# Patient Record
Sex: FEMALE | Race: BLACK OR AFRICAN AMERICAN | Employment: FULL TIME | URBAN - METROPOLITAN AREA
[De-identification: names, ages, dates, MRNs, and addresses within clinical notes are randomized per-mention and may not be internally consistent; named-entity substitution may affect disease eponyms.]

---

## 2017-02-28 ENCOUNTER — TELEPHONE (OUTPATIENT)
Dept: FAMILY MEDICINE CLINIC | Age: 42
End: 2017-02-28

## 2017-03-21 ENCOUNTER — HOSPITAL ENCOUNTER (EMERGENCY)
Age: 42
Discharge: HOME | End: 2017-03-21
Attending: EMERGENCY MEDICINE
Payer: MEDICARE

## 2017-03-21 ENCOUNTER — APPOINTMENT (OUTPATIENT)
Dept: ULTRASOUND IMAGING | Age: 42
End: 2017-03-21
Payer: MEDICARE

## 2017-03-21 VITALS
WEIGHT: 195.99 LBS | HEART RATE: 78 BPM | HEIGHT: 70 IN | TEMPERATURE: 98.1 F | RESPIRATION RATE: 18 BRPM | BODY MASS INDEX: 28.06 KG/M2 | SYSTOLIC BLOOD PRESSURE: 130 MMHG | DIASTOLIC BLOOD PRESSURE: 75 MMHG | OXYGEN SATURATION: 99 %

## 2017-03-21 DIAGNOSIS — N93.8 DUB (DYSFUNCTIONAL UTERINE BLEEDING): Primary | ICD-10-CM

## 2017-03-21 DIAGNOSIS — R10.2 PELVIC PAIN: ICD-10-CM

## 2017-03-21 DIAGNOSIS — F32.A DEPRESSION, UNSPECIFIED DEPRESSION TYPE: ICD-10-CM

## 2017-03-21 DIAGNOSIS — F41.9 ANXIETY: ICD-10-CM

## 2017-03-21 LAB
ALBUMIN SERPL BCP-MCNC: 3.6 G/DL (ref 3.5–5)
ALBUMIN/GLOB SERPL: 1.1 {RATIO} (ref 1.1–2.2)
ALP SERPL-CCNC: 80 U/L (ref 45–117)
ALT SERPL-CCNC: 14 U/L (ref 12–78)
AMORPH CRY URNS QL MICRO: ABNORMAL
ANION GAP BLD CALC-SCNC: 6 MMOL/L (ref 5–15)
APPEARANCE UR: CLEAR
AST SERPL W P-5'-P-CCNC: 10 U/L (ref 15–37)
BACTERIA URNS QL MICRO: NEGATIVE /HPF
BASOPHILS # BLD AUTO: 0 K/UL (ref 0–0.1)
BASOPHILS # BLD: 0 % (ref 0–1)
BILIRUB SERPL-MCNC: 0.5 MG/DL (ref 0.2–1)
BILIRUB UR QL CFM: NEGATIVE
BUN SERPL-MCNC: 16 MG/DL (ref 6–20)
BUN/CREAT SERPL: 17 (ref 12–20)
CALCIUM SERPL-MCNC: 8.8 MG/DL (ref 8.5–10.1)
CHLORIDE SERPL-SCNC: 108 MMOL/L (ref 97–108)
CLUE CELLS VAG QL WET PREP: NORMAL
CO2 SERPL-SCNC: 28 MMOL/L (ref 21–32)
COLOR UR: ABNORMAL
CREAT SERPL-MCNC: 0.92 MG/DL (ref 0.55–1.02)
EOSINOPHIL # BLD: 0 K/UL (ref 0–0.4)
EOSINOPHIL NFR BLD: 1 % (ref 0–7)
EPITH CASTS URNS QL MICRO: ABNORMAL /LPF
ERYTHROCYTE [DISTWIDTH] IN BLOOD BY AUTOMATED COUNT: 13.6 % (ref 11.5–14.5)
GLOBULIN SER CALC-MCNC: 3.2 G/DL (ref 2–4)
GLUCOSE SERPL-MCNC: 99 MG/DL (ref 65–100)
GLUCOSE UR STRIP.AUTO-MCNC: NEGATIVE MG/DL
HCG UR QL: NEGATIVE
HCT VFR BLD AUTO: 36.6 % (ref 35–47)
HGB BLD-MCNC: 11.9 G/DL (ref 11.5–16)
HGB UR QL STRIP: ABNORMAL
KETONES UR QL STRIP.AUTO: NEGATIVE MG/DL
KOH PREP SPEC: NORMAL
LEUKOCYTE ESTERASE UR QL STRIP.AUTO: NEGATIVE
LIPASE SERPL-CCNC: 140 U/L (ref 73–393)
LYMPHOCYTES # BLD AUTO: 22 % (ref 12–49)
LYMPHOCYTES # BLD: 1.2 K/UL (ref 0.8–3.5)
MCH RBC QN AUTO: 29 PG (ref 26–34)
MCHC RBC AUTO-ENTMCNC: 32.5 G/DL (ref 30–36.5)
MCV RBC AUTO: 89.1 FL (ref 80–99)
MONOCYTES # BLD: 0.3 K/UL (ref 0–1)
MONOCYTES NFR BLD AUTO: 5 % (ref 5–13)
MUCOUS THREADS URNS QL MICRO: ABNORMAL /LPF
NEUTS SEG # BLD: 4.1 K/UL (ref 1.8–8)
NEUTS SEG NFR BLD AUTO: 72 % (ref 32–75)
NITRITE UR QL STRIP.AUTO: NEGATIVE
PH UR STRIP: 6 [PH] (ref 5–8)
PLATELET # BLD AUTO: 151 K/UL (ref 150–400)
POTASSIUM SERPL-SCNC: 3.6 MMOL/L (ref 3.5–5.1)
PROT SERPL-MCNC: 6.8 G/DL (ref 6.4–8.2)
PROT UR STRIP-MCNC: ABNORMAL MG/DL
RBC # BLD AUTO: 4.11 M/UL (ref 3.8–5.2)
RBC #/AREA URNS HPF: ABNORMAL /HPF (ref 0–5)
SERVICE CMNT-IMP: NORMAL
SODIUM SERPL-SCNC: 142 MMOL/L (ref 136–145)
SP GR UR REFRACTOMETRY: 1.03 (ref 1–1.03)
T VAGINALIS VAG QL WET PREP: NORMAL
UA: UC IF INDICATED,UAUC: ABNORMAL
UROBILINOGEN UR QL STRIP.AUTO: 1 EU/DL (ref 0.2–1)
WBC # BLD AUTO: 5.6 K/UL (ref 3.6–11)
WBC URNS QL MICRO: ABNORMAL /HPF (ref 0–4)

## 2017-03-21 PROCEDURE — 87491 CHLMYD TRACH DNA AMP PROBE: CPT | Performed by: PHYSICIAN ASSISTANT

## 2017-03-21 PROCEDURE — 83690 ASSAY OF LIPASE: CPT | Performed by: PHYSICIAN ASSISTANT

## 2017-03-21 PROCEDURE — 99284 EMERGENCY DEPT VISIT MOD MDM: CPT

## 2017-03-21 PROCEDURE — 96361 HYDRATE IV INFUSION ADD-ON: CPT

## 2017-03-21 PROCEDURE — 87210 SMEAR WET MOUNT SALINE/INK: CPT | Performed by: PHYSICIAN ASSISTANT

## 2017-03-21 PROCEDURE — 36415 COLL VENOUS BLD VENIPUNCTURE: CPT | Performed by: PHYSICIAN ASSISTANT

## 2017-03-21 PROCEDURE — 96374 THER/PROPH/DIAG INJ IV PUSH: CPT

## 2017-03-21 PROCEDURE — 74011250637 HC RX REV CODE- 250/637: Performed by: PHYSICIAN ASSISTANT

## 2017-03-21 PROCEDURE — 80053 COMPREHEN METABOLIC PANEL: CPT | Performed by: PHYSICIAN ASSISTANT

## 2017-03-21 PROCEDURE — 85025 COMPLETE CBC W/AUTO DIFF WBC: CPT | Performed by: PHYSICIAN ASSISTANT

## 2017-03-21 PROCEDURE — 81001 URINALYSIS AUTO W/SCOPE: CPT | Performed by: PHYSICIAN ASSISTANT

## 2017-03-21 PROCEDURE — 76857 US EXAM PELVIC LIMITED: CPT

## 2017-03-21 PROCEDURE — 81025 URINE PREGNANCY TEST: CPT | Performed by: PHYSICIAN ASSISTANT

## 2017-03-21 PROCEDURE — 74011250636 HC RX REV CODE- 250/636: Performed by: PHYSICIAN ASSISTANT

## 2017-03-21 RX ORDER — ONDANSETRON 4 MG/1
4 TABLET, ORALLY DISINTEGRATING ORAL
Status: COMPLETED | OUTPATIENT
Start: 2017-03-21 | End: 2017-03-21

## 2017-03-21 RX ORDER — MELOXICAM 15 MG/1
15 TABLET ORAL DAILY
Qty: 10 TAB | Refills: 0 | Status: SHIPPED | OUTPATIENT
Start: 2017-03-21 | End: 2017-03-31

## 2017-03-21 RX ORDER — SODIUM CHLORIDE 0.9 % (FLUSH) 0.9 %
5-10 SYRINGE (ML) INJECTION AS NEEDED
Status: DISCONTINUED | OUTPATIENT
Start: 2017-03-21 | End: 2017-03-21 | Stop reason: HOSPADM

## 2017-03-21 RX ORDER — ONDANSETRON 4 MG/1
4 TABLET, ORALLY DISINTEGRATING ORAL
Qty: 10 TAB | Refills: 0 | Status: SHIPPED | OUTPATIENT
Start: 2017-03-21 | End: 2017-04-12

## 2017-03-21 RX ORDER — MEDROXYPROGESTERONE ACETATE 10 MG/1
10 TABLET ORAL DAILY
Qty: 10 TAB | Refills: 0 | Status: SHIPPED | OUTPATIENT
Start: 2017-03-21 | End: 2017-03-31

## 2017-03-21 RX ORDER — KETOROLAC TROMETHAMINE 30 MG/ML
30 INJECTION, SOLUTION INTRAMUSCULAR; INTRAVENOUS
Status: COMPLETED | OUTPATIENT
Start: 2017-03-21 | End: 2017-03-21

## 2017-03-21 RX ORDER — SODIUM CHLORIDE 0.9 % (FLUSH) 0.9 %
5-10 SYRINGE (ML) INJECTION EVERY 8 HOURS
Status: DISCONTINUED | OUTPATIENT
Start: 2017-03-21 | End: 2017-03-21 | Stop reason: HOSPADM

## 2017-03-21 RX ADMIN — SODIUM CHLORIDE 1000 ML: 900 INJECTION, SOLUTION INTRAVENOUS at 11:33

## 2017-03-21 RX ADMIN — KETOROLAC TROMETHAMINE 30 MG: 30 INJECTION, SOLUTION INTRAMUSCULAR at 11:33

## 2017-03-21 RX ADMIN — Medication 10 ML: at 11:33

## 2017-03-21 RX ADMIN — ONDANSETRON 4 MG: 4 TABLET, ORALLY DISINTEGRATING ORAL at 11:26

## 2017-03-21 NOTE — ED PROVIDER NOTES
HPI Comments: Vito Hopson is a 39 y.o. female with PMhx significant for Asthma, Hypothyroidism, Anemia, Vitamin D deficiency who presents ambulatory to the ED with cc of gradually worsening lower suprapubic abdominal pain with associated heavy vaginal bleeding that began yesterday. Pt reports that she has been on the depovera shot x 10 years but reports she has not had it in 2 months. She states that she has had abdominal pain and vaginal bleeding prior to her current symptoms noting that her bleeding lasted about 2 weeks. Pt reports using mulitple sanitary napkins daily secondary to the servarity of her vaginal bleeding. Pt denies any nausea, vomiting, vaginal discharge, or back pain    Social history significant for: - Tobacco, + EtOH, - Illicit drug use    PCP: Akhil Iniguez MD    There are no other complaints, changes or physical findings at this time. Written by Michelle Hood ED Scribe, as dictated by Cele Bee      The history is provided by the patient. No  was used. Past Medical History:   Diagnosis Date    Anemia     Asthma     Fall 2006    fall at work and injured back    Hypothyroidism     hypothyriod     Vitamin D deficiency        Past Surgical History:   Procedure Laterality Date    HX HERNIA REPAIR  10/07/2016    open ventral hernia repair by Dr. Laura Gaytan.  HX HERNIA REPAIR  10/07/2016    open ventral hernia repair by Dr Silvia Ho Right 1/2015    had to rebrake because u=it healed wrong         Family History:   Problem Relation Age of Onset    Heart Disease Maternal Grandmother     Stroke Maternal Grandmother        Social History     Social History    Marital status: SINGLE     Spouse name: N/A    Number of children: N/A    Years of education: N/A     Occupational History    Not on file.      Social History Main Topics    Smoking status: Never Smoker    Smokeless tobacco: Never Used    Alcohol use 0.0 oz/week      Comment: Socially.  Drug use: No    Sexual activity: No     Other Topics Concern    Not on file     Social History Narrative    ** Merged History Encounter **              ALLERGIES: Latex; Latex; Onion; Oxycodone; Tomato; Egg; Hydrocodone; Mushroom; and Shellfish derived    Review of Systems   Constitutional: Negative for chills and fever. HENT: Negative for congestion, rhinorrhea and sore throat. Respiratory: Negative for cough and shortness of breath. Cardiovascular: Negative for chest pain and palpitations. Gastrointestinal: Positive for abdominal pain (Lower suprapubic ). Negative for diarrhea, nausea and vomiting. Genitourinary: Positive for vaginal bleeding. Negative for dysuria, hematuria and vaginal discharge. Musculoskeletal: Negative for back pain, neck pain and neck stiffness. Skin: Negative for rash and wound. Neurological: Negative for dizziness and headaches. Psychiatric/Behavioral: Negative for agitation and confusion. Patient Vitals for the past 12 hrs:   Temp Pulse Resp BP SpO2   03/21/17 1042 98.1 °F (36.7 °C) 82 16 127/85 98 %       Physical Exam   Constitutional: She is oriented to person, place, and time. She appears well-developed and well-nourished. No distress. WDWN AA female, alert, anxious, but NAD   HENT:   Head: Normocephalic and atraumatic. Nose: Nose normal.   Eyes: Conjunctivae and EOM are normal. Right eye exhibits no discharge. Left eye exhibits no discharge. No scleral icterus. Neck: Normal range of motion. Neck supple. No JVD present. No tracheal deviation present. No thyromegaly present. Cardiovascular: Normal rate, regular rhythm and normal heart sounds. Pulmonary/Chest: Effort normal and breath sounds normal. No respiratory distress. She has no wheezes. Abdominal: Soft. There is no tenderness. Genitourinary:   Genitourinary Comments: See PELVIC exam for details   Musculoskeletal: Normal range of motion. She exhibits no edema. Lymphadenopathy:     She has no cervical adenopathy. Neurological: She is alert and oriented to person, place, and time. She exhibits normal muscle tone. Coordination normal.   Skin: Skin is warm and dry. She is not diaphoretic. No pallor. Psychiatric: She has a normal mood and affect. Her behavior is normal. Judgment normal.   Nursing note and vitals reviewed. MDM  Number of Diagnoses or Management Options  Anxiety:   Depression, unspecified depression type:   DUB (dysfunctional uterine bleeding):   Pelvic pain:   Diagnosis management comments:   DDx: DUB, Uterine fibroid, ovarian cyst       Amount and/or Complexity of Data Reviewed  Clinical lab tests: ordered and reviewed  Review and summarize past medical records: yes    Patient Progress  Patient progress: stable        Pelvic Exam  Date/Time: 3/21/2017 11:35 AM  Performed by: PA  Procedure duration:  15 minutes. Documented by:  Antoinette Capellan PA-C. Exam assisted by:  Carlos De La Cruz RN. Type of exam performed: bimanual and speculum. External genitalia appearance: normal.    Vaginal exam:  bleeding. Bleeding: moderate  Cervical exam:  os closed and no cervical motion tenderness. Specimen(s) collected:  chlamydia and GC. Bimanual exam:  normal.    Patient tolerance: Patient tolerated the procedure well with no immediate complications          Procedure Note - Pelvic Exam:    11:30 AM  Performed by: Ulysses Son  Chaperoned by: Carlos De LaC ruz RN  Pelvic exam was performed using bimanual and speculum. Further findings noted in physical exam.   The procedure took 1-15 minutes, and pt tolerated well. Written by FLOYD Choudhuryibe, as dictated by Ulysses Son.     LABORATORY TESTS:  Recent Results (from the past 12 hour(s))   CBC WITH AUTOMATED DIFF    Collection Time: 03/21/17 11:31 AM   Result Value Ref Range    WBC 5.6 3.6 - 11.0 K/uL    RBC 4.11 3.80 - 5.20 M/uL    HGB 11.9 11.5 - 16.0 g/dL    HCT 36.6 35.0 - 47.0 %    MCV 89.1 80.0 - 99.0 FL    MCH 29.0 26.0 - 34.0 PG    MCHC 32.5 30.0 - 36.5 g/dL    RDW 13.6 11.5 - 14.5 %    PLATELET 922 556 - 872 K/uL    NEUTROPHILS 72 32 - 75 %    LYMPHOCYTES 22 12 - 49 %    MONOCYTES 5 5 - 13 %    EOSINOPHILS 1 0 - 7 %    BASOPHILS 0 0 - 1 %    ABS. NEUTROPHILS 4.1 1.8 - 8.0 K/UL    ABS. LYMPHOCYTES 1.2 0.8 - 3.5 K/UL    ABS. MONOCYTES 0.3 0.0 - 1.0 K/UL    ABS. EOSINOPHILS 0.0 0.0 - 0.4 K/UL    ABS. BASOPHILS 0.0 0.0 - 0.1 K/UL   METABOLIC PANEL, COMPREHENSIVE    Collection Time: 03/21/17 11:31 AM   Result Value Ref Range    Sodium 142 136 - 145 mmol/L    Potassium 3.6 3.5 - 5.1 mmol/L    Chloride 108 97 - 108 mmol/L    CO2 28 21 - 32 mmol/L    Anion gap 6 5 - 15 mmol/L    Glucose 99 65 - 100 mg/dL    BUN 16 6 - 20 MG/DL    Creatinine 0.92 0.55 - 1.02 MG/DL    BUN/Creatinine ratio 17 12 - 20      GFR est AA >60 >60 ml/min/1.73m2    GFR est non-AA >60 >60 ml/min/1.73m2    Calcium 8.8 8.5 - 10.1 MG/DL    Bilirubin, total 0.5 0.2 - 1.0 MG/DL    ALT (SGPT) 14 12 - 78 U/L    AST (SGOT) 10 (L) 15 - 37 U/L    Alk.  phosphatase 80 45 - 117 U/L    Protein, total 6.8 6.4 - 8.2 g/dL    Albumin 3.6 3.5 - 5.0 g/dL    Globulin 3.2 2.0 - 4.0 g/dL    A-G Ratio 1.1 1.1 - 2.2     LIPASE    Collection Time: 03/21/17 11:31 AM   Result Value Ref Range    Lipase 140 73 - 393 U/L   KOH, OTHER SOURCES    Collection Time: 03/21/17 11:41 AM   Result Value Ref Range    Special Requests: NO SPECIAL REQUESTS      KOH NO YEAST SEEN     WET PREP    Collection Time: 03/21/17 11:41 AM   Result Value Ref Range    Clue cells CLUE CELLS ABSENT      Wet prep NO TRICHOMONAS SEEN     URINALYSIS W/ REFLEX CULTURE    Collection Time: 03/21/17 11:51 AM   Result Value Ref Range    Color YELLOW/STRAW      Appearance CLEAR CLEAR      Specific gravity 1.030 1.003 - 1.030      pH (UA) 6.0 5.0 - 8.0      Protein TRACE (A) NEG mg/dL    Glucose NEGATIVE  NEG mg/dL    Ketone NEGATIVE  NEG mg/dL    Blood LARGE (A) NEG      Urobilinogen 1.0 0.2 - 1.0 EU/dL    Nitrites NEGATIVE  NEG      Leukocyte Esterase NEGATIVE  NEG      WBC 0-4 0 - 4 /hpf    RBC 20-50 0 - 5 /hpf    Epithelial cells FEW FEW /lpf    Bacteria NEGATIVE  NEG /hpf    UA:UC IF INDICATED CULTURE NOT INDICATED BY UA RESULT CNI      Mucus 3+ (A) NEG /lpf    Amorphous Crystals FEW (A) NEG     HCG URINE, QL    Collection Time: 03/21/17 11:51 AM   Result Value Ref Range    HCG urine, Ql. NEGATIVE  NEG     BILIRUBIN, CONFIRM    Collection Time: 03/21/17 11:51 AM   Result Value Ref Range    Bilirubin UA, confirm NEGATIVE  NEG         IMAGING RESULTS:  US PELV NON OBS  LTD   Final Result   History: Recently off Depo-Provera for 10 years, heavy bleeding.     Ultrasonography of the pelvis was performed transabdominally. The patient  refused the transvaginal scan. The uterus measures 8.0 x 4.4 x 5.4 cm. The  endometrial stripe measures 5.4 mm. The ovaries are obscured as the bladder is  suboptimally filled.     IMPRESSION  IMPRESSION: Unremarkable limited pelvic ultrasound. MEDICATIONS GIVEN:  Medications   sodium chloride (NS) flush 5-10 mL (not administered)   sodium chloride (NS) flush 5-10 mL (10 mL IntraVENous Given 3/21/17 1133)   sodium chloride 0.9 % bolus infusion 1,000 mL (0 mL IntraVENous IV Completed 3/21/17 1345)   ondansetron (ZOFRAN ODT) tablet 4 mg (4 mg Oral Given 3/21/17 1126)   ketorolac (TORADOL) injection 30 mg (30 mg IntraVENous Given 3/21/17 1133)       IMPRESSION:  1. DUB (dysfunctional uterine bleeding)    2. Pelvic pain    3. Anxiety    4. Depression, unspecified depression type        PLAN:  1. Current Discharge Medication List      START taking these medications    Details   meloxicam (MOBIC) 15 mg tablet Take 1 Tab by mouth daily for 10 days. Qty: 10 Tab, Refills: 0      ondansetron (ZOFRAN ODT) 4 mg disintegrating tablet Take 1 Tab by mouth every eight (8) hours as needed for Nausea for up to 10 doses.   Qty: 10 Tab, Refills: 0      medroxyPROGESTERone (PROVERA) 10 mg tablet Take 1 Tab by mouth daily for 10 days. Qty: 10 Tab, Refills: 0           2. Follow-up Information     Follow up With Details Comments Contact Info    your Ob/Gyn In 2 days      [facility] EMERGENCY DEPT  If symptoms worsen [address] [place] [address]  [phone]        Return to ED if worse     DISCHARGE NOTE  1:56 PM  The patient has been re-evaluated and is ready for discharge. Reviewed available results with patient. Counseled patient on diagnosis and care plan. Patient has expressed understanding, and all questions have been answered. Patient agrees with plan and agrees to follow up as recommended, or return to the ED if their symptoms worsen. Discharge instructions have been provided and explained to the patient, along with reasons to return to the ED. This note is prepared by José Benjamin, acting as Scribe for [facility]. CLARA Clarke: The scribe's documentation has been prepared under my direction and personally reviewed by me in its entirety. I confirm that the note above accurately reflects all work, treatment, procedures, and medical decision making performed by me.

## 2017-03-21 NOTE — ED NOTES
Pt medicated per PA orders. Pelvic exam completed by PA and chaperoned by this RN.  Pt given warm blanket

## 2017-03-21 NOTE — ED NOTES
Discharge instructions reviewed with pt by KELLY Eaton. pt able to return/verbalize discharge instructions. Copy of discharge instructions given. Work note and RX given to patient. Patient condition stable, respiratory status within normal limits, neuro status intact.  Ambulatory out of er, accompanied by sister

## 2017-03-21 NOTE — DISCHARGE INSTRUCTIONS
Abnormal Uterine Bleeding: Care Instructions  Your Care Instructions  Abnormal uterine bleeding (AUB) is irregular bleeding from the uterus that is longer or heavier than usual or does not occur at your regular time. Sometimes it is caused by changes in hormone levels. It can also be caused by growths in the uterus, such as fibroids or polyps. Sometimes a cause cannot be found. You may have heavy bleeding when you are not expecting your period. Your doctor may suggest a pregnancy test, if you think you are pregnant. Follow-up care is a key part of your treatment and safety. Be sure to make and go to all appointments, and call your doctor if you are having problems. It's also a good idea to know your test results and keep a list of the medicines you take. How can you care for yourself at home? · Be safe with medicines. Take pain medicines exactly as directed. ¨ If the doctor gave you a prescription medicine for pain, take it as prescribed. ¨ If you are not taking a prescription pain medicine, ask your doctor if you can take an over-the-counter medicine. · You may be low in iron because of blood loss. Eat a balanced diet that is high in iron and vitamin C. Foods rich in iron include red meat, shellfish, eggs, beans, and leafy green vegetables. Talk to your doctor about whether you need to take iron pills or a multivitamin. When should you call for help? Call 911 anytime you think you may need emergency care. For example, call if:  · You passed out (lost consciousness). Call your doctor now or seek immediate medical care if:  · You have sudden, severe pain in your belly or pelvis. · You have severe vaginal bleeding. You are soaking through your usual pads or tampons every hour for 2 or more hours. · You feel dizzy or lightheaded, or you feel like you may faint. Watch closely for changes in your health, and be sure to contact your doctor if:  · You have new belly or pelvic pain.   · You have a fever.  · Your bleeding gets worse or lasts longer than 1 week. · You think you may be pregnant. Where can you learn more? Go to http://danika-sarah.info/. Enter B032 in the search box to learn more about \"Abnormal Uterine Bleeding: Care Instructions. \"  Current as of: February 25, 2016  Content Version: 11.1  © 7035-3132 [employer]. Care instructions adapted under license by [employer] (which disclaims liability or warranty for this information). If you have questions about a medical condition or this instruction, always ask your healthcare professional. [place] [address] any warranty or liability for your use of this information.

## 2017-03-21 NOTE — LETTER
[address] 
[facility] EMERGENCY DEPT 
[address] [place] [address] [address] [id number] 
[phone] Work/School Note Date: 3/21/2017 To Whom It May concern: 
 
Monique Stubbs was seen and treated today in the emergency room. She may return to work in 1 to 2 days, as symptoms improve. Sincerely, Adina Pelletierma

## 2017-03-21 NOTE — ED NOTES
Assumed care of patient. Pt resting in position of comfort. Call bell within reach. Pt reports \"migraine headaches and real real bad menstrual abdominal pain\" States headaches for the past 1-2 days in temples across forehead. No diagnosis of migraines per patient. Took tylenol, advil with no relief. Lower abd pain reports hasn't had her depo in 2 months. States vaginal bleeding since yesterday, heaving bleeding.

## 2017-03-22 LAB
C TRACH DNA SPEC QL NAA+PROBE: NEGATIVE
N GONORRHOEA DNA SPEC QL NAA+PROBE: NEGATIVE
SAMPLE TYPE: NORMAL
SERVICE CMNT-IMP: NORMAL
SPECIMEN SOURCE: NORMAL

## 2017-04-11 PROCEDURE — 99284 EMERGENCY DEPT VISIT MOD MDM: CPT

## 2017-04-12 ENCOUNTER — HOSPITAL ENCOUNTER (EMERGENCY)
Age: 42
Discharge: HOME | End: 2017-04-12
Attending: EMERGENCY MEDICINE | Admitting: EMERGENCY MEDICINE
Payer: COMMERCIAL

## 2017-04-12 ENCOUNTER — APPOINTMENT (OUTPATIENT)
Dept: GENERAL RADIOLOGY | Age: 42
End: 2017-04-12
Attending: PHYSICIAN ASSISTANT
Payer: COMMERCIAL

## 2017-04-12 ENCOUNTER — APPOINTMENT (OUTPATIENT)
Dept: CT IMAGING | Age: 42
End: 2017-04-12
Attending: PHYSICIAN ASSISTANT
Payer: COMMERCIAL

## 2017-04-12 VITALS
SYSTOLIC BLOOD PRESSURE: 121 MMHG | TEMPERATURE: 98.1 F | WEIGHT: 193.56 LBS | OXYGEN SATURATION: 100 % | RESPIRATION RATE: 16 BRPM | DIASTOLIC BLOOD PRESSURE: 77 MMHG | BODY MASS INDEX: 27.71 KG/M2 | HEIGHT: 70 IN | HEART RATE: 78 BPM

## 2017-04-12 DIAGNOSIS — S16.1XXA NECK STRAIN, INITIAL ENCOUNTER: Primary | ICD-10-CM

## 2017-04-12 DIAGNOSIS — M25.512 ACUTE PAIN OF LEFT SHOULDER: ICD-10-CM

## 2017-04-12 DIAGNOSIS — S39.012A BACK STRAIN, INITIAL ENCOUNTER: ICD-10-CM
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
## 2017-04-12 LAB
HCG UR QL: NEGATIVE
HCG UR QL: POSITIVE

## 2017-04-12 PROCEDURE — 72072 X-RAY EXAM THORAC SPINE 3VWS: CPT

## 2017-04-12 PROCEDURE — 72125 CT NECK SPINE W/O DYE: CPT

## 2017-04-12 PROCEDURE — 73030 X-RAY EXAM OF SHOULDER: CPT

## 2017-04-12 PROCEDURE — 74011250637 HC RX REV CODE- 250/637: Performed by: PHYSICIAN ASSISTANT

## 2017-04-12 PROCEDURE — 81025 URINE PREGNANCY TEST: CPT

## 2017-04-12 RX ORDER — ALBUTEROL SULFATE 5 MG/ML
1.25 SOLUTION RESPIRATORY (INHALATION) ONCE
COMMUNITY
End: 2017-09-12

## 2017-04-12 RX ORDER — CYCLOBENZAPRINE HCL 10 MG
10 TABLET ORAL
Status: COMPLETED | OUTPATIENT
Start: 2017-04-12 | End: 2017-04-12

## 2017-04-12 RX ORDER — HYDROMORPHONE HYDROCHLORIDE 2 MG/1
2 TABLET ORAL ONCE
Status: COMPLETED | OUTPATIENT
Start: 2017-04-12 | End: 2017-04-12

## 2017-04-12 RX ORDER — NAPROXEN 500 MG/1
500 TABLET ORAL 2 TIMES DAILY WITH MEALS
Qty: 20 TAB | Refills: 0 | Status: SHIPPED | OUTPATIENT
Start: 2017-04-12 | End: 2017-04-22

## 2017-04-12 RX ORDER — DIAZEPAM 5 MG/1
5 TABLET ORAL
Qty: 15 TAB | Refills: 0 | Status: SHIPPED | OUTPATIENT
Start: 2017-04-12 | End: 2017-08-06

## 2017-04-12 RX ORDER — ACETAMINOPHEN 325 MG/1
500 TABLET ORAL
COMMUNITY
End: 2017-09-12

## 2017-04-12 RX ORDER — NAPROXEN 250 MG/1
500 TABLET ORAL ONCE
Status: COMPLETED | OUTPATIENT
Start: 2017-04-12 | End: 2017-04-12

## 2017-04-12 RX ADMIN — HYDROMORPHONE HYDROCHLORIDE 2 MG: 2 TABLET ORAL at 02:30

## 2017-04-12 RX ADMIN — CYCLOBENZAPRINE HYDROCHLORIDE 10 MG: 10 TABLET, FILM COATED ORAL at 02:30

## 2017-04-12 RX ADMIN — NAPROXEN 500 MG: 250 TABLET ORAL at 02:30

## 2017-04-12 NOTE — Clinical Note
1. Start Naproxen and Valium 2. Rest, ice, no heavy lifting (over 10 lbs) for 1 week 3.  Follow up with PCP and/or ortho

## 2017-04-12 NOTE — ED NOTES
Pt ambulated out of ED with steady gait after declining wheelchair ride out. No other complaints voiced at this time.

## 2017-04-12 NOTE — LETTER
[address] 
[facility] EMERGENCY DEPT 
[address] [place] [address] [address] [phone] 
[phone] Work/School Note Date: 4/11/2017 To Whom It May concern: 
 
Acosta Hamm was seen and treated today in the emergency room by the following provider(s): 
Attending Provider: Juan Glez MD 
Physician Assistant: TITI Mcginnis. Acosta Hamm may return to work on 4/14/17 or once cleared by a licensed healthcare physician. Sincerely, Xiao Mcginnis
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
## 2017-04-12 NOTE — DISCHARGE INSTRUCTIONS

## 2017-04-12 NOTE — ED NOTES
Pt resting in stretcher. Call bell within reach. Updated on plan of care. Provided with pain medication, \"non-diet pepsi and marcus crackers. \" per request of pt. No other complaints voiced at this time.

## 2017-04-13 ENCOUNTER — OFFICE VISIT (OUTPATIENT)
Dept: FAMILY MEDICINE CLINIC | Age: 42
End: 2017-04-13

## 2017-04-13 VITALS
WEIGHT: 194 LBS | HEART RATE: 79 BPM | RESPIRATION RATE: 18 BRPM | TEMPERATURE: 97.4 F | OXYGEN SATURATION: 98 % | HEIGHT: 70 IN | BODY MASS INDEX: 27.77 KG/M2 | SYSTOLIC BLOOD PRESSURE: 134 MMHG | DIASTOLIC BLOOD PRESSURE: 92 MMHG

## 2017-04-13 DIAGNOSIS — S46.912A LEFT SHOULDER STRAIN, INITIAL ENCOUNTER: Primary | ICD-10-CM

## 2017-04-13 DIAGNOSIS — E03.9 ACQUIRED HYPOTHYROIDISM: ICD-10-CM

## 2017-04-13 DIAGNOSIS — M54.2 NECK PAIN: ICD-10-CM
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
## 2017-04-14 LAB
T4 FREE SERPL-MCNC: 0.86 NG/DL (ref 0.82–1.77)
TSH SERPL DL<=0.005 MIU/L-ACNC: 2.66 UIU/ML (ref 0.45–4.5)

## 2017-08-06 ENCOUNTER — APPOINTMENT (OUTPATIENT)
Dept: CT IMAGING | Age: 42
End: 2017-08-06
Attending: EMERGENCY MEDICINE
Payer: COMMERCIAL

## 2017-08-06 ENCOUNTER — HOSPITAL ENCOUNTER (EMERGENCY)
Age: 42
Discharge: HOME | End: 2017-08-07
Attending: EMERGENCY MEDICINE
Payer: COMMERCIAL

## 2017-08-06 ENCOUNTER — APPOINTMENT (OUTPATIENT)
Dept: GENERAL RADIOLOGY | Age: 42
End: 2017-08-06
Attending: EMERGENCY MEDICINE
Payer: COMMERCIAL

## 2017-08-06 DIAGNOSIS — H66.93 BILATERAL ACUTE OTITIS MEDIA: ICD-10-CM

## 2017-08-06 DIAGNOSIS — R07.89 ATYPICAL CHEST PAIN: ICD-10-CM

## 2017-08-06 DIAGNOSIS — S09.90XA CHI (CLOSED HEAD INJURY), INITIAL ENCOUNTER: Primary | ICD-10-CM

## 2017-08-06 LAB
ALBUMIN SERPL BCP-MCNC: 3.3 G/DL (ref 3.5–5)
ALBUMIN/GLOB SERPL: 1 {RATIO} (ref 1.1–2.2)
ALP SERPL-CCNC: 74 U/L (ref 45–117)
ALT SERPL-CCNC: 16 U/L (ref 12–78)
ANION GAP BLD CALC-SCNC: 8 MMOL/L (ref 5–15)
APTT PPP: 26.7 SEC (ref 22.1–32.5)
AST SERPL W P-5'-P-CCNC: 16 U/L (ref 15–37)
BASOPHILS # BLD AUTO: 0 K/UL (ref 0–0.1)
BASOPHILS # BLD: 0 % (ref 0–1)
BILIRUB SERPL-MCNC: 0.3 MG/DL (ref 0.2–1)
BUN SERPL-MCNC: 16 MG/DL (ref 6–20)
BUN/CREAT SERPL: 17 (ref 12–20)
CALCIUM SERPL-MCNC: 7.9 MG/DL (ref 8.5–10.1)
CHLORIDE SERPL-SCNC: 109 MMOL/L (ref 97–108)
CK SERPL-CCNC: 203 U/L (ref 26–192)
CO2 SERPL-SCNC: 24 MMOL/L (ref 21–32)
CREAT SERPL-MCNC: 0.92 MG/DL (ref 0.55–1.02)
EOSINOPHIL # BLD: 0.3 K/UL (ref 0–0.4)
EOSINOPHIL NFR BLD: 4 % (ref 0–7)
ERYTHROCYTE [DISTWIDTH] IN BLOOD BY AUTOMATED COUNT: 13.6 % (ref 11.5–14.5)
GLOBULIN SER CALC-MCNC: 3.3 G/DL (ref 2–4)
GLUCOSE SERPL-MCNC: 103 MG/DL (ref 65–100)
HCT VFR BLD AUTO: 34.5 % (ref 35–47)
HGB BLD-MCNC: 11.7 G/DL (ref 11.5–16)
INR PPP: 1 (ref 0.9–1.1)
LYMPHOCYTES # BLD AUTO: 35 % (ref 12–49)
LYMPHOCYTES # BLD: 2.2 K/UL (ref 0.8–3.5)
MAGNESIUM SERPL-MCNC: 2.2 MG/DL (ref 1.6–2.4)
MCH RBC QN AUTO: 30.4 PG (ref 26–34)
MCHC RBC AUTO-ENTMCNC: 33.9 G/DL (ref 30–36.5)
MCV RBC AUTO: 89.6 FL (ref 80–99)
MONOCYTES # BLD: 0.6 K/UL (ref 0–1)
MONOCYTES NFR BLD AUTO: 9 % (ref 5–13)
NEUTS SEG # BLD: 3.3 K/UL (ref 1.8–8)
NEUTS SEG NFR BLD AUTO: 52 % (ref 32–75)
PLATELET # BLD AUTO: 207 K/UL (ref 150–400)
POTASSIUM SERPL-SCNC: 3.9 MMOL/L (ref 3.5–5.1)
PROT SERPL-MCNC: 6.6 G/DL (ref 6.4–8.2)
PROTHROMBIN TIME: 9.9 SEC (ref 9–11.1)
RBC # BLD AUTO: 3.85 M/UL (ref 3.8–5.2)
SODIUM SERPL-SCNC: 141 MMOL/L (ref 136–145)
THERAPEUTIC RANGE,PTTT: NORMAL SECS (ref 58–77)
TROPONIN I SERPL-MCNC: <0.04 NG/ML
WBC # BLD AUTO: 6.4 K/UL (ref 3.6–11)

## 2017-08-06 PROCEDURE — 74011250637 HC RX REV CODE- 250/637: Performed by: EMERGENCY MEDICINE

## 2017-08-06 PROCEDURE — 99283 EMERGENCY DEPT VISIT LOW MDM: CPT

## 2017-08-06 PROCEDURE — 85730 THROMBOPLASTIN TIME PARTIAL: CPT | Performed by: EMERGENCY MEDICINE

## 2017-08-06 PROCEDURE — 80053 COMPREHEN METABOLIC PANEL: CPT | Performed by: EMERGENCY MEDICINE

## 2017-08-06 PROCEDURE — 85025 COMPLETE CBC W/AUTO DIFF WBC: CPT | Performed by: EMERGENCY MEDICINE

## 2017-08-06 PROCEDURE — 70450 CT HEAD/BRAIN W/O DYE: CPT

## 2017-08-06 PROCEDURE — 96376 TX/PRO/DX INJ SAME DRUG ADON: CPT

## 2017-08-06 PROCEDURE — 96374 THER/PROPH/DIAG INJ IV PUSH: CPT

## 2017-08-06 PROCEDURE — 82550 ASSAY OF CK (CPK): CPT | Performed by: EMERGENCY MEDICINE

## 2017-08-06 PROCEDURE — 84484 ASSAY OF TROPONIN QUANT: CPT | Performed by: EMERGENCY MEDICINE

## 2017-08-06 PROCEDURE — 71010 XR CHEST PORT: CPT

## 2017-08-06 PROCEDURE — 36415 COLL VENOUS BLD VENIPUNCTURE: CPT | Performed by: EMERGENCY MEDICINE

## 2017-08-06 PROCEDURE — 74011250636 HC RX REV CODE- 250/636: Performed by: EMERGENCY MEDICINE

## 2017-08-06 PROCEDURE — 93005 ELECTROCARDIOGRAM TRACING: CPT

## 2017-08-06 PROCEDURE — 85610 PROTHROMBIN TIME: CPT | Performed by: EMERGENCY MEDICINE

## 2017-08-06 PROCEDURE — 96361 HYDRATE IV INFUSION ADD-ON: CPT

## 2017-08-06 PROCEDURE — 83735 ASSAY OF MAGNESIUM: CPT | Performed by: EMERGENCY MEDICINE

## 2017-08-06 PROCEDURE — 96375 TX/PRO/DX INJ NEW DRUG ADDON: CPT

## 2017-08-06 RX ORDER — AMOXICILLIN 500 MG/1
500 TABLET, FILM COATED ORAL 3 TIMES DAILY
Qty: 21 TAB | Refills: 0 | Status: SHIPPED | OUTPATIENT
Start: 2017-08-06 | End: 2017-09-12

## 2017-08-06 RX ORDER — MORPHINE SULFATE 10 MG/ML
6 INJECTION, SOLUTION INTRAMUSCULAR; INTRAVENOUS
Status: COMPLETED | OUTPATIENT
Start: 2017-08-06 | End: 2017-08-06

## 2017-08-06 RX ORDER — PROMETHAZINE HYDROCHLORIDE 25 MG/1
25 TABLET ORAL
Qty: 12 TAB | Refills: 0 | Status: SHIPPED | OUTPATIENT
Start: 2017-08-06 | End: 2017-08-07 | Stop reason: ALTCHOICE

## 2017-08-06 RX ORDER — ONDANSETRON 2 MG/ML
4 INJECTION INTRAMUSCULAR; INTRAVENOUS
Status: COMPLETED | OUTPATIENT
Start: 2017-08-06 | End: 2017-08-06

## 2017-08-06 RX ORDER — AMOXICILLIN 250 MG/1
500 CAPSULE ORAL
Status: COMPLETED | OUTPATIENT
Start: 2017-08-06 | End: 2017-08-06

## 2017-08-06 RX ORDER — KETOROLAC TROMETHAMINE 10 MG/1
10 TABLET, FILM COATED ORAL
Qty: 10 TAB | Refills: 0 | Status: SHIPPED | OUTPATIENT
Start: 2017-08-06 | End: 2017-08-07 | Stop reason: ALTCHOICE

## 2017-08-06 RX ORDER — DIAZEPAM 5 MG/1
5 TABLET ORAL
Qty: 20 TAB | Refills: 0 | Status: SHIPPED | OUTPATIENT
Start: 2017-08-06 | End: 2017-08-07 | Stop reason: ALTCHOICE

## 2017-08-06 RX ORDER — KETOROLAC TROMETHAMINE 30 MG/ML
30 INJECTION, SOLUTION INTRAMUSCULAR; INTRAVENOUS
Status: COMPLETED | OUTPATIENT
Start: 2017-08-06 | End: 2017-08-06

## 2017-08-06 RX ADMIN — AMOXICILLIN 500 MG: 250 CAPSULE ORAL at 23:50

## 2017-08-06 RX ADMIN — ONDANSETRON 4 MG: 2 INJECTION INTRAMUSCULAR; INTRAVENOUS at 23:50

## 2017-08-06 RX ADMIN — ONDANSETRON 4 MG: 2 INJECTION INTRAMUSCULAR; INTRAVENOUS at 23:14

## 2017-08-06 RX ADMIN — SODIUM CHLORIDE 1000 ML: 900 INJECTION, SOLUTION INTRAVENOUS at 23:50

## 2017-08-06 RX ADMIN — KETOROLAC TROMETHAMINE 30 MG: 30 INJECTION, SOLUTION INTRAMUSCULAR at 23:50

## 2017-08-06 RX ADMIN — MORPHINE SULFATE 6 MG: 10 INJECTION INTRAMUSCULAR; INTRAVENOUS; SUBCUTANEOUS at 23:51

## 2017-08-06 NOTE — LETTER
[address] 
[facility] EMERGENCY DEPT 
[address] [place] [address]. [address] [phone] 
[phone] Work/School Note Date: 8/6/2017 To Whom It May concern: 
 
Saumya Woods was seen and treated today in the emergency room by the following provider(s): 
Attending Provider: Thomas Graves MD.   
 
Saumya Woods may return to work on 08/08/2017. Sincerely, Thomas Graves MD

## 2017-08-07 ENCOUNTER — OFFICE VISIT (OUTPATIENT)
Dept: FAMILY MEDICINE CLINIC | Age: 42
End: 2017-08-07

## 2017-08-07 VITALS
RESPIRATION RATE: 16 BRPM | HEIGHT: 70 IN | DIASTOLIC BLOOD PRESSURE: 69 MMHG | BODY MASS INDEX: 27.96 KG/M2 | SYSTOLIC BLOOD PRESSURE: 107 MMHG | TEMPERATURE: 98.2 F | OXYGEN SATURATION: 99 % | WEIGHT: 195.33 LBS | HEART RATE: 76 BPM

## 2017-08-07 VITALS
OXYGEN SATURATION: 99 % | TEMPERATURE: 97.9 F | DIASTOLIC BLOOD PRESSURE: 103 MMHG | SYSTOLIC BLOOD PRESSURE: 130 MMHG | WEIGHT: 197 LBS | BODY MASS INDEX: 28.2 KG/M2 | RESPIRATION RATE: 18 BRPM | HEART RATE: 76 BPM | HEIGHT: 70 IN

## 2017-08-07 DIAGNOSIS — R51.9 HEADACHE, UNSPECIFIED HEADACHE TYPE: Primary | ICD-10-CM

## 2017-08-07 DIAGNOSIS — R03.0 ELEVATED BLOOD PRESSURE READING: ICD-10-CM

## 2017-08-07 DIAGNOSIS — H66.90 ACUTE OTITIS MEDIA, UNSPECIFIED LATERALITY, UNSPECIFIED OTITIS MEDIA TYPE: ICD-10-CM

## 2017-08-07 LAB
ATRIAL RATE: 72 BPM
CALCULATED P AXIS, ECG09: 57 DEGREES
CALCULATED R AXIS, ECG10: 37 DEGREES
CALCULATED T AXIS, ECG11: 33 DEGREES
DIAGNOSIS, 93000: NORMAL
P-R INTERVAL, ECG05: 144 MS
Q-T INTERVAL, ECG07: 368 MS
QRS DURATION, ECG06: 80 MS
QTC CALCULATION (BEZET), ECG08: 402 MS
VENTRICULAR RATE, ECG03: 72 BPM

## 2017-08-07 NOTE — PROGRESS NOTES
Chief Complaint   Patient presents with    Thyroid Problem     bloodwork     Migraine     follow up ED     1. Have you been to the ER, urgent care clinic since your last visit? Hospitalized since your last visit? Yes [facility]- Ear pain, chest pain, mirgraine- 8/6/2017    2. Have you seen or consulted any other health care providers outside of the [address] [place] [address] since your last visit? Include any pap smears or colon screening.  No

## 2017-08-07 NOTE — LETTER
NOTIFICATION RETURN TO WORK / SCHOOL 
 
8/7/2017 7:07 PM 
 
Ms. Deion Singh [facility] [address] [phone] To Whom It May Concern: 
 
Deion Singh is currently under the care of LISA Joseph. She will return to work/school on: 8/11/17. If there are questions or concerns please have the patient contact our office.  
 
 
 
Sincerely, 
 
 
Pawel Barrett NP

## 2017-08-07 NOTE — ED PROVIDER NOTES
HPI Comments: Debbie Suarez is a 39 y.o. female, pmhx significant for asthma, hypothyroidism, anemia, vitamin D deficiency, who presents ambulatory to the ED c/o sudden onset 6/10 HA with associated waxing and waning CP that radiates to her BL shoulders with associated mild SOB, dry cough and photophobia x today. She mentions an additional sx of BLE cramping x weeks. Pt states that her CP, SOB and cough feel similar to previous asthma attacks and she is concerned that it is the indicator that one is coming on. Pt hit her head yesterday but denies LOC afterwards. She endorses taking aleve without relief. Pt specifically denies fever, chills, neck pain, numbness, tingling, hx of migraines or taking blood thinners. PCP: Bertrand Hammans, MD      Social Hx: -tobacco, +EtOH (socially), -Illicit Drugs   FHx: no pertinent family hx   Medication Allergies: oxycodone, hydrocodone      There are no other complaints, changes, or physical findings at this time. The history is provided by the patient. Past Medical History:   Diagnosis Date    Anemia     Asthma     Fall 2006    fall at work and injured back    Hypothyroidism     hypothyriod     Vitamin D deficiency        Past Surgical History:   Procedure Laterality Date    HX HERNIA REPAIR  10/07/2016    open ventral hernia repair by Dr. Aurora Erickson.  HX HERNIA REPAIR  10/07/2016    open ventral hernia repair by Dr Arelis Dietrich Right 1/2015    had to rebrake because u=it healed wrong         Family History:   Problem Relation Age of Onset    Heart Disease Maternal Grandmother     Stroke Maternal Grandmother        Social History     Social History    Marital status: SINGLE     Spouse name: N/A    Number of children: N/A    Years of education: N/A     Occupational History    Not on file.      Social History Main Topics    Smoking status: Never Smoker    Smokeless tobacco: Never Used    Alcohol use 0.0 oz/week      Comment: Socially.  Drug use: No    Sexual activity: No     Other Topics Concern    Not on file     Social History Narrative    ** Merged History Encounter **              ALLERGIES: Latex; Latex; Onion; Oxycodone; Tomato; Egg; Hydrocodone; Mushroom; and Shellfish derived    Review of Systems   Constitutional: Negative for chills and fever. Eyes: Positive for photophobia. Respiratory: Positive for cough (dry) and shortness of breath (mild). Cardiovascular: Positive for chest pain. Gastrointestinal: Negative for abdominal pain. Endocrine: Negative for heat intolerance. Genitourinary: Negative for dysuria. Musculoskeletal: Negative for neck pain. +BLE cramping    Skin: Negative for rash. Allergic/Immunologic: Negative for immunocompromised state. Neurological: Positive for headaches. Negative for syncope and numbness. No tingling   Hematological: Does not bruise/bleed easily. Psychiatric/Behavioral: Negative. All other systems reviewed and are negative. Patient Vitals for the past 12 hrs:   Temp Pulse Resp BP SpO2   08/06/17 2113 98.2 °F (36.8 °C) 73 16 (!) 147/100 100 %     Physical Exam   Constitutional: She is oriented to person, place, and time. She appears well-developed and well-nourished. No distress. HENT:   Head: Normocephalic and atraumatic. Eyes: EOM are normal.   Neck: Normal range of motion. Neck non-tender    Cardiovascular: Normal rate, regular rhythm and normal heart sounds. Pulmonary/Chest: Effort normal and breath sounds normal. No respiratory distress. She exhibits no tenderness. Abdominal: Soft. Bowel sounds are normal. She exhibits no mass. There is no tenderness. Musculoskeletal: Normal range of motion. She exhibits no edema. Neurological: She is alert and oriented to person, place, and time. Coordination normal.   Sensory and motor intact    Skin: Skin is warm and dry. Psychiatric: She has a normal mood and affect.    Nursing note and vitals reviewed. MDM  Number of Diagnoses or Management Options  Atypical chest pain:   Bilateral acute otitis media:   CHI (closed head injury), initial encounter:   Diagnosis management comments: DDx: tension HA, closed head injury, ICH, atypical CP, CAd, anxiety, electrolyte abnormality       Amount and/or Complexity of Data Reviewed  Clinical lab tests: ordered and reviewed  Tests in the radiology section of CPT®: reviewed and ordered  Tests in the medicine section of CPT®: reviewed and ordered  Review and summarize past medical records: yes  Independent visualization of images, tracings, or specimens: yes    Patient Progress  Patient progress: stable    ED Course       Procedures  EKG interpretation: (Preliminary) 2229  Rhythm: normal sinus rhythm; and regular . Rate (approx.): 72 bpm; Axis: normal; UT interval: normal; QRS interval: normal ; ST/T wave: normal; Other findings: unchanged from previous ekg.   Written by Rahul Toney ED Scribe as dictated by Romana Richter, MD    DISCHARGE NOTE:  LABORATORY TESTS:  Recent Results (from the past 12 hour(s))   EKG, 12 LEAD, INITIAL    Collection Time: 08/06/17 10:29 PM   Result Value Ref Range    Ventricular Rate 72 BPM    Atrial Rate 72 BPM    P-R Interval 144 ms    QRS Duration 80 ms    Q-T Interval 368 ms    QTC Calculation (Bezet) 402 ms    Calculated P Axis 57 degrees    Calculated R Axis 37 degrees    Calculated T Axis 33 degrees    Diagnosis       Normal sinus rhythm  Low voltage QRS  Borderline ECG  When compared with ECG of 03-OCT-2016 09:36,  No significant change was found     CBC WITH AUTOMATED DIFF    Collection Time: 08/06/17 10:43 PM   Result Value Ref Range    WBC 6.4 3.6 - 11.0 K/uL    RBC 3.85 3.80 - 5.20 M/uL    HGB 11.7 11.5 - 16.0 g/dL    HCT 34.5 (L) 35.0 - 47.0 %    MCV 89.6 80.0 - 99.0 FL    MCH 30.4 26.0 - 34.0 PG    MCHC 33.9 30.0 - 36.5 g/dL    RDW 13.6 11.5 - 14.5 %    PLATELET 115 818 - 362 K/uL    NEUTROPHILS 52 32 - 75 % LYMPHOCYTES 35 12 - 49 %    MONOCYTES 9 5 - 13 %    EOSINOPHILS 4 0 - 7 %    BASOPHILS 0 0 - 1 %    ABS. NEUTROPHILS 3.3 1.8 - 8.0 K/UL    ABS. LYMPHOCYTES 2.2 0.8 - 3.5 K/UL    ABS. MONOCYTES 0.6 0.0 - 1.0 K/UL    ABS. EOSINOPHILS 0.3 0.0 - 0.4 K/UL    ABS. BASOPHILS 0.0 0.0 - 0.1 K/UL   METABOLIC PANEL, COMPREHENSIVE    Collection Time: 08/06/17 10:43 PM   Result Value Ref Range    Sodium 141 136 - 145 mmol/L    Potassium 3.9 3.5 - 5.1 mmol/L    Chloride 109 (H) 97 - 108 mmol/L    CO2 24 21 - 32 mmol/L    Anion gap 8 5 - 15 mmol/L    Glucose 103 (H) 65 - 100 mg/dL    BUN 16 6 - 20 MG/DL    Creatinine 0.92 0.55 - 1.02 MG/DL    BUN/Creatinine ratio 17 12 - 20      GFR est AA >60 >60 ml/min/1.73m2    GFR est non-AA >60 >60 ml/min/1.73m2    Calcium 7.9 (L) 8.5 - 10.1 MG/DL    Bilirubin, total 0.3 0.2 - 1.0 MG/DL    ALT (SGPT) 16 12 - 78 U/L    AST (SGOT) 16 15 - 37 U/L    Alk. phosphatase 74 45 - 117 U/L    Protein, total 6.6 6.4 - 8.2 g/dL    Albumin 3.3 (L) 3.5 - 5.0 g/dL    Globulin 3.3 2.0 - 4.0 g/dL    A-G Ratio 1.0 (L) 1.1 - 2.2     MAGNESIUM    Collection Time: 08/06/17 10:43 PM   Result Value Ref Range    Magnesium 2.2 1.6 - 2.4 mg/dL   CK    Collection Time: 08/06/17 10:43 PM   Result Value Ref Range     (H) 26 - 192 U/L   TROPONIN I    Collection Time: 08/06/17 10:43 PM   Result Value Ref Range    Troponin-I, Qt. <0.04 <0.05 ng/mL   PROTHROMBIN TIME + INR    Collection Time: 08/06/17 10:43 PM   Result Value Ref Range    INR 1.0 0.9 - 1.1      Prothrombin time 9.9 9.0 - 11.1 sec   PTT    Collection Time: 08/06/17 10:43 PM   Result Value Ref Range    aPTT 26.7 22.1 - 32.5 sec    aPTT, therapeutic range     58.0 - 77.0 SECS       IMAGING RESULTS:  CT Results  (Last 48 hours)               08/06/17 2253  CT HEAD WO CONT Final result    Impression:  IMPRESSION: No acute intracranial abnormality. Narrative:  EXAM:  CT HEAD WO CONT       INDICATION:   pain/trauma       COMPARISON: CT 4/30/2013. CONTRAST:  None. TECHNIQUE: Unenhanced CT of the head was performed using 5 mm images. Brain and   bone windows were generated. CT dose reduction was achieved through use of a   standardized protocol tailored for this examination and automatic exposure   control for dose modulation. FINDINGS:   The ventricles and sulci are normal in size, shape and configuration and   midline. There is no significant white matter disease. There is no intracranial   hemorrhage, extra-axial collection, mass, mass effect or midline shift. The   basilar cisterns are open. No acute infarct is identified. The bone windows   demonstrate no abnormalities. The visualized portions of the paranasal sinuses   and mastoid air cells are clear. CXR Results  (Last 48 hours)               08/06/17 2217  XR CHEST PORT Final result    Impression:  IMPRESSION: No evidence of acute cardiopulmonary process. Narrative:  INDICATION:  Chest Pain        COMPARISON: 11/16/2014       FINDINGS: Single AP portable view of the chest obtained at 2213 demonstrates a   stable cardiomediastinal silhouette. The lungs are clear bilaterally. No osseous   abnormalities are seen. MEDICATIONS GIVEN:  Medications   sodium chloride 0.9 % bolus infusion 1,000 mL (not administered)   morphine injection 6 mg (not administered)   ondansetron (ZOFRAN) injection 4 mg (not administered)   ketorolac (TORADOL) injection 30 mg (not administered)   ondansetron (ZOFRAN) injection 4 mg (4 mg IntraVENous Given 8/6/17 3220)       IMPRESSION:  1. CHI (closed head injury), initial encounter    2. Atypical chest pain    3. Bilateral acute otitis media        PLAN:  Current Discharge Medication List      START taking these medications    Details   diazePAM (VALIUM) 5 mg tablet Take 1 Tab by mouth every eight (8) hours as needed for Anxiety.  Max Daily Amount: 15 mg.  Qty: 20 Tab, Refills: 0      promethazine (PHENERGAN) 25 mg tablet Take 1 Tab by mouth every six (6) hours as needed for Nausea. Qty: 12 Tab, Refills: 0      ketorolac (TORADOL) 10 mg tablet Take 1 Tab by mouth every six (6) hours as needed for Pain. Qty: 10 Tab, Refills: 0           Follow-up Information     Follow up With Details Comments Contact Info    Leeann Bean MD In 2 days As needed     [facility] EMERGENCY DEPT  If symptoms worsen [address] [facility]  As needed [address] [facility]  [phone]        Return to ED if worse     DISCHARGE NOTE:  11:24 PM  Pt has been reexamined. Pt has no new complaints, changes, or physical findings. Care plan outlined and precautions discussed. All available results reviewed with pt. All medications reviewed with pt. All of pts questions and concerns addressed. Pt agrees to f/u as instructed and agrees to return to ED upon further deterioration. Pt is ready to go home. Written by Farzaneh Sanabria ED Scribe as dictated by temporal arteritis    ATTESTATION   This note is prepared by Farzaneh Sanabria acting as scribe for . Hailey Goins MD : The scribe's documentation has been prepared under my direction and personally reviewed by me in its entirety. I confirm that the note above accurately reflects all work, treatment, procedures, and medical decision making performed by me.

## 2017-08-07 NOTE — DISCHARGE INSTRUCTIONS
Ear Infection (Otitis Media): Care Instructions  Your Care Instructions    An ear infection may start with a cold and affect the middle ear (otitis media). It can hurt a lot. Most ear infections clear up on their own in a couple of days. Most often you will not need antibiotics. This is because many ear infections are caused by a virus. Antibiotics don't work against a virus. Regular doses of pain medicines are the best way to reduce your fever and help you feel better. Follow-up care is a key part of your treatment and safety. Be sure to make and go to all appointments, and call your doctor if you are having problems. It's also a good idea to know your test results and keep a list of the medicines you take. How can you care for yourself at home? · Take pain medicines exactly as directed. ¨ If the doctor gave you a prescription medicine for pain, take it as prescribed. ¨ If you are not taking a prescription pain medicine, take an over-the-counter medicine, such as acetaminophen (Tylenol), ibuprofen (Advil, Motrin), or naproxen (Aleve). Read and follow all instructions on the label. ¨ Do not take two or more pain medicines at the same time unless the doctor told you to. Many pain medicines have acetaminophen, which is Tylenol. Too much acetaminophen (Tylenol) can be harmful. · Plan to take a full dose of pain reliever before bedtime. Getting enough sleep will help you get better. · Try a warm, moist washcloth on the ear. It may help relieve pain. · If your doctor prescribed antibiotics, take them as directed. Do not stop taking them just because you feel better. You need to take the full course of antibiotics. When should you call for help? Call your doctor now or seek immediate medical care if:  · You have new or increasing ear pain. · You have new or increasing pus or blood draining from your ear. · You have a fever with a stiff neck or a severe headache.   Watch closely for changes in your [employer], and be sure to contact your doctor if:  · You have new or worse symptoms. · You are not getting better after taking an antibiotic for 2 days. Where can you learn more? Go to http://danika-sarah.info/. Enter R934 in the search box to learn more about \"Ear Infection (Otitis Media): Care Instructions. \"  Current as of: May 4, 2017  Content Version: 11.3  © 4768-3296 [employer]. Care instructions adapted under license by [employer] (which disclaims liability or warranty for this information). If you have questions about a medical condition or this instruction, always ask your healthcare professional. [address] any warranty or liability for your use of this information. Chest Pain: Care Instructions  Your Care Instructions  There are many things that can cause chest pain. Some are not serious and will get better on their own in a few days. But some kinds of chest pain need more testing and treatment. Your doctor may have recommended a follow-up visit in the next 8 to 12 hours. If you are not getting better, you may need more tests or treatment. Even though your doctor has released you, you still need to watch for any problems. The doctor carefully checked you, but sometimes problems can develop later. If you have new symptoms or if your symptoms do not get better, get medical care right away. If you have worse or different chest pain or pressure that lasts more than 5 minutes or you passed out (lost consciousness), call 911 or seek other emergency help right away. A medical visit is only one step in your treatment. Even if you feel better, you still need to do what your doctor recommends, such as going to all suggested follow-up appointments and taking medicines exactly as directed. This will help you recover and help prevent future problems. How can you care for yourself at home? · Rest until you feel better.   · Take your medicine exactly as prescribed. Call your doctor if you think you are having a problem with your medicine. · Do not drive after taking a prescription pain medicine. When should you call for help? Call 911 if:  · You passed out (lost consciousness). · You have severe difficulty breathing. · You have symptoms of a heart attack. These may include:  ¨ Chest pain or pressure, or a strange feeling in your chest.  ¨ Sweating. ¨ Shortness of breath. ¨ Nausea or vomiting. ¨ Pain, pressure, or a strange feeling in your back, neck, jaw, or upper belly or in one or both shoulders or arms. ¨ Lightheadedness or sudden weakness. ¨ A fast or irregular heartbeat. After you call 911, the  may tell you to chew 1 adult-strength or 2 to 4 low-dose aspirin. Wait for an ambulance. Do not try to drive yourself. Call your doctor today if:  · You have any trouble breathing. · Your chest pain gets worse. · You are dizzy or lightheaded, or you feel like you may faint. · You are not getting better as expected. · You are having new or different chest pain. Where can you learn more? Go to http://danika-sarah.info/. Enter A120 in the search box to learn more about \"Chest Pain: Care Instructions. \"  Current as of: March 20, 2017  Content Version: 11.3  © 2341-2093 [employer]. Care instructions adapted under license by [employer] (which disclaims liability or warranty for this information). If you have questions about a medical condition or this instruction, always ask your healthcare professional. [place] [address] any warranty or liability for your use of this information. Learning About a Closed Head Injury  What is a closed head injury? A closed head injury happens when your head gets hit hard. The strong force of the blow causes your brain to shake in your skull. This movement can cause the brain to bruise, swell, or tear.  Sometimes nerves or blood vessels also get damaged. This can cause bleeding in or around the brain. A concussion is a type of closed head injury. What are the symptoms? If you have a mild concussion, you may have a mild headache or feel \"not quite right. \" These symptoms are common. They usually go away over a few days to 4 weeks. But sometimes after a concussion, you feel like you can't function as well as before the injury. And you have new symptoms. This is called postconcussive syndrome. You may:  · Find it harder to solve problems, think, concentrate, or remember. · Have headaches. · Have changes in your sleep patterns, such as not being able to sleep or sleeping all the time. · Have changes in your personality. · Not be interested in your usual activities. · Feel angry or anxious without a clear reason. · Lose your sense of taste or smell. · Be dizzy, lightheaded, or unsteady. It may be hard to stand or walk. How is a closed head injury treated? Any person who may have a concussion needs to see a doctor. Some people have to stay in the hospital to be watched. Others can go home safely. If you go home, follow your doctor's instructions. He or she will tell you if you need someone to watch you closely for the next 24 hours or longer. Rest is the best treatment. Get plenty of sleep at night. And try to rest during the day. · Avoid activities that are physically or mentally demanding. These include housework, exercise, and schoolwork. And don't play video games, send text messages, or use the computer. You may need to change your school or work schedule to be able to avoid these activities. · Ask your doctor when it's okay to drive, ride a bike, or operate machinery. · Take an over-the-counter pain medicine, such as acetaminophen (Tylenol), ibuprofen (Advil, Motrin), or naproxen (Aleve). Be safe with medicines. Read and follow all instructions on the label.   · Check with your doctor before you use any other medicines for pain. · Do not drink alcohol or use illegal drugs. They can slow recovery. They can also increase your risk of getting a second head injury. Follow-up care is a key part of your treatment and safety. Be sure to make and go to all appointments, and call your doctor if you are having problems. It's also a good idea to know your test results and keep a list of the medicines you take. Where can you learn more? Go to http://dankia-sarah.info/. Enter E235 in the search box to learn more about \"Learning About a Closed Head Injury. \"  Current as of: October 14, 2016  Content Version: 11.3  © 2440-4867 [employer]. Care instructions adapted under license by [employer] (which disclaims liability or warranty for this information). If you have questions about a medical condition or this instruction, always ask your healthcare professional. [place] [address] any warranty or liability for your use of this information.

## 2017-09-12 ENCOUNTER — OFFICE VISIT (OUTPATIENT)
Dept: FAMILY MEDICINE CLINIC | Age: 42
End: 2017-09-12

## 2017-09-12 VITALS
TEMPERATURE: 97.7 F | HEIGHT: 70 IN | OXYGEN SATURATION: 98 % | WEIGHT: 196 LBS | DIASTOLIC BLOOD PRESSURE: 88 MMHG | HEART RATE: 76 BPM | RESPIRATION RATE: 20 BRPM | SYSTOLIC BLOOD PRESSURE: 127 MMHG | BODY MASS INDEX: 28.06 KG/M2

## 2017-09-12 DIAGNOSIS — G43.009 MIGRAINE WITHOUT AURA AND WITHOUT STATUS MIGRAINOSUS, NOT INTRACTABLE: Primary | ICD-10-CM

## 2017-09-12 DIAGNOSIS — Z11.4 SCREENING FOR HIV (HUMAN IMMUNODEFICIENCY VIRUS): ICD-10-CM

## 2017-09-12 DIAGNOSIS — Z13.220 LIPID SCREENING: ICD-10-CM

## 2017-09-12 DIAGNOSIS — F43.20 ADULT SITUATIONAL STRESS DISORDER: ICD-10-CM

## 2017-09-12 DIAGNOSIS — E03.9 ACQUIRED HYPOTHYROIDISM: ICD-10-CM

## 2017-09-12 RX ORDER — AMITRIPTYLINE HYDROCHLORIDE 25 MG/1
25 TABLET, FILM COATED ORAL
Qty: 30 TAB | Refills: 3 | Status: SHIPPED | OUTPATIENT
Start: 2017-09-12 | End: 2017-10-05

## 2017-09-12 NOTE — LETTER
NOTIFICATION RETURN TO WORK / SCHOOL 
 
9/12/2017 Ms. Omari Su [facility] [address] [id number] To Whom It May Concern: 
 
Omari Su is currently under the care of LISA Joseph. She will return to work/school on: 11/12/17 (approximate, will depend on how she is doing) Needs leave of absence for health problems which are temporary. If there are questions or concerns please have the patient contact our office. Sincerely, Josey Owens MD

## 2017-09-12 NOTE — PROGRESS NOTES
Identified pt with two pt identifiers(name and ). Reviewed record in preparation for visit and have obtained necessary documentation. Chief Complaint   Patient presents with    Stress    Depo     pt wants to restart the Depo        Health Maintenance Due   Topic    Pneumococcal 19-64 Medium Risk (1 of 1 - PPSV23)    DTaP/Tdap/Td series (1 - Tdap)    PAP AKA CERVICAL CYTOLOGY     INFLUENZA AGE 9 TO ADULT      HM reviewed w/patient~ Patient reports that she will see the MD for her pap. Coordination of Care Questionnaire:  :   1) Have you been to an emergency room, urgent care clinic since your last visit? No  Hospitalized since your last visit? no             2. Have seen or consulted any other health care provider since your last visit? NO  If yes, where when, and reason for visit? 3) Do you have an Advanced Directive/ Living Will in place? NO  If yes, do we have a copy on file NO      Patient is accompanied by self.       PHQ over the last two weeks 2017   Little interest or pleasure in doing things Several days   Feeling down, depressed or hopeless Several days   Total Score PHQ 2 2

## 2017-09-12 NOTE — PATIENT INSTRUCTIONS
Hypothyroidism: Care Instructions  Your Care Instructions  You have hypothyroidism, which means that your body is not making enough thyroid hormone. This hormone helps your body use energy. If your thyroid level is low, you may feel tired, be constipated, have an increase in your blood pressure, or have dry skin or memory problems. You may also get cold easily, even when it is warm. Women with low thyroid levels may have heavy menstrual periods. A blood test to find your thyroid-stimulating hormone (TSH) level is used to check for hypothyroidism. A high TSH level may mean that you have low thyroid. When your body is not making enough thyroid hormone, TSH levels rise in an effort to make the body produce more. The treatment for hypothyroidism is to take thyroid hormone pills. You should start to feel better in 1 to 2 weeks. But it can take several months to see changes in the TSH level. You will need regular visits with your doctor to make sure you have the right dose of medicine. Most people need treatment for the rest of their lives. You will need to see your doctor regularly to have blood tests and to make sure you are doing well. Follow-up care is a key part of your treatment and safety. Be sure to make and go to all appointments, and call your doctor if you are having problems. Its also a good idea to know your test results and keep a list of the medicines you take. How can you care for yourself at home? · Take your thyroid hormone medicine exactly as prescribed. Call your doctor if you think you are having a problem with your medicine. Most people do not have side effects if they take the right amount of medicine regularly. ¨ Take the medicine 30 minutes before breakfast, and do not take it with calcium, vitamins, or iron. ¨ Do not take extra doses of your thyroid medicine. It will not help you get better any faster, and it may cause side effects.   ¨ If you forget to take a dose, do NOT take a double dose of medicine. Take your usual dose the next day. · Tell your doctor about all prescription, herbal, or over-the-counter products you take. · Take care of yourself. Eat a healthy diet, get enough sleep, and get regular exercise. When should you call for help? Call 911 anytime you think you may need emergency care. For example, call if:  · You passed out (lost consciousness). · You have severe trouble breathing. · You have a very slow heartbeat (less than 60 beats a minute). · You have a low body temperature (95°F or below). Call your doctor now or seek immediate medical care if:  · You feel tired, sluggish, or weak. · You have trouble remembering things or concentrating. · You do not begin to feel better 2 weeks after starting your medicine. Watch closely for changes in your health, and be sure to contact your doctor if you have any problems. Where can you learn more? Go to http://danika-sarah.info/. Enter K056 in the search box to learn more about \"Hypothyroidism: Care Instructions. \"  Current as of: July 28, 2016  Content Version: 11.3  © 5221-2682 [employer]park. Care instructions adapted under license by L[employer]Layer (which disclaims liability or warranty for this information). If you have questions about a medical condition or this instruction, always ask your healthcare professional. [place] [address] any warranty or liability for your use of this information. [facility], [address], [address]   [facility]   [address]  Phone [phone]  Fax [phone]  [employer]    Dr. Pillo Rao, Chloe Witt [address] counselor, substance abuse counselor  [facility], [address] [place] [address] Avenue  Phone: [phone]    Reather Bernheim, Ph.D.  Zeyad Blancas Office  Phone: [phone]  FAX: [phone]  [address]  Phone: [phone]  FAX: [phone]  201 [facility], [address]  [address] [facility]  [phone]  FAX: [phone], [address]    [facility] Office  [phone]  FAX: 800 [facility], 295 [facility], [address] [place] [address] [address], 100 [facility] [address] 110 [facility], [facility] [address]  [phone]    [facility] [place] - Elena Williamson, Ph.D  Miguel Ángel Schwartzk[address], [address] [facility] [address]   [phone]    The [address] [facility] Office  10[address] [facility], [address]  [phone]    [facility] Office  [address], [address], [address] [place] [address], [place], [address]   [phone]    [facility] ([place] near [facility])  [phone]    [facility] [place] Location  [address], [facility]    [address], [address] [place] [address], [address] [place] [address] Road  [phone]    Aim for 10 min walk every day (or more if you can tolerate it)  Start amitriptyline 25mg nightly
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
## 2017-09-12 NOTE — MR AVS SNAPSHOT
Visit Information Date & Time Provider Department Dept. Phone Encounter #  
 9/12/2017  9:45 AM Jeftheo Wetmore, [address] [facility] [address] [phone] [id number] Upcoming Health Maintenance Date Due Pneumococcal 19-64 Medium Risk (1 of 1 - PPSV23) 12/7/1994 DTaP/Tdap/Td series (1 - Tdap) 12/7/1996 PAP AKA CERVICAL CYTOLOGY 3/3/2017 INFLUENZA AGE 9 TO ADULT 8/1/2017 Allergies as of 9/12/2017  Review Complete On: 9/12/2017 By: Guicho Medina MD  
  
 Severity Noted Reaction Type Reactions Latex  09/20/2010    Swelling Latex  05/20/2012    Rash Onion High 06/11/2015    Anaphylaxis Oxycodone High 06/11/2015    Anaphylaxis, Shortness of Breath Tomato High 06/24/2016    Anaphylaxis Egg  10/03/2016    Rash Hydrocodone  06/11/2015    Shortness of Breath, Itching Mushroom  06/24/2016    Rash Shellfish Derived  06/11/2015    Swelling Current Immunizations  Reviewed on 6/29/2015 No immunizations on file. Not reviewed this visit You Were Diagnosed With   
  
 Codes Comments Acquired hypothyroidism    -  Primary ICD-10-CM: E03.9 ICD-9-CM: 244.9 Lipid screening     ICD-10-CM: K61.502 ICD-9-CM: V77.91 Adult situational stress disorder     ICD-10-CM: F45.22 ICD-9-CM: 308.9 Screening for HIV (human immunodeficiency virus)     ICD-10-CM: Z11.4 ICD-9-CM: V73.89 Migraine without aura and without status migrainosus, not intractable     ICD-10-CM: I71.643 ICD-9-CM: 346.10 Vitals BP Pulse Temp Resp Height(growth percentile) Weight(growth percentile) 127/88 (BP 1 Location: Left arm, BP Patient Position: Sitting) 76 97.7 °F (36.5 °C) (Oral) 20 5' 10\" (1.778 m) 196 lb (88.9 kg) LMP SpO2 BMI OB Status Smoking Status 08/18/2017 98% 28.12 kg/m2 Having regular periods Never Smoker Vitals History BMI and BSA Data  Body Mass Index Body Surface Area  
 28.12 kg/m 2 2.1 m 2  
  
  
 Preferred Pharmacy Pharmacy Name Phone [facility] [address], [address] [phone] Your Updated Medication List  
  
   
This list is accurate as of: 9/12/17 10:36 AM.  Always use your most recent med list.  
  
  
  
  
 amitriptyline 25 mg tablet Commonly known as:  ELAVIL Take 1 Tab by mouth nightly. Prescriptions Sent to Pharmacy Refills  
 amitriptyline (ELAVIL) 25 mg tablet 3 Sig: Take 1 Tab by mouth nightly. Class: Normal  
 Pharmacy: [facility] [address], [address] Ph #: [phone] Route: Oral  
  
We Performed the Following HIV 1/2 AG/AB, 4TH GENERATION,W RFLX CONFIRM [[id number] Custom] LIPID PANEL [66742 CPT(R)] THYROID ANTIBODY PANEL [93059 CPT(R)] TSH 3RD GENERATION [28091 CPT(R)] Patient Instructions Hypothyroidism: Care Instructions Your Care Instructions You have hypothyroidism, which means that your body is not making enough thyroid hormone. This hormone helps your body use energy. If your thyroid level is low, you may feel tired, be constipated, have an increase in your blood pressure, or have dry skin or memory problems. You may also get cold easily, even when it is warm. Women with low thyroid levels may have heavy menstrual periods. A blood test to find your thyroid-stimulating hormone (TSH) level is used to check for hypothyroidism. A high TSH level may mean that you have low thyroid. When your body is not making enough thyroid hormone, TSH levels rise in an effort to make the body produce more. The treatment for hypothyroidism is to take thyroid hormone pills. You should start to feel better in 1 to 2 weeks. But it can take several months to see changes in the TSH level. You will need regular visits with your doctor to make sure you have the right dose of medicine. Most people need treatment for the rest of their lives. You will need to see your doctor regularly to have blood tests and to make sure you are doing well. Follow-up care is a key part of your treatment and safety. Be sure to make and go to all appointments, and call your doctor if you are having problems. Its also a good idea to know your test results and keep a list of the medicines you take. How can you care for yourself at home? · Take your thyroid hormone medicine exactly as prescribed. Call your doctor if you think you are having a problem with your medicine. Most people do not have side effects if they take the right amount of medicine regularly. ¨ Take the medicine 30 minutes before breakfast, and do not take it with calcium, vitamins, or iron. ¨ Do not take extra doses of your thyroid medicine. It will not help you get better any faster, and it may cause side effects. ¨ If you forget to take a dose, do NOT take a double dose of medicine. Take your usual dose the next day. · Tell your doctor about all prescription, herbal, or over-the-counter products you take. · Take care of yourself. Eat a healthy diet, get enough sleep, and get regular exercise. When should you call for help? Call 911 anytime you think you may need emergency care. For example, call if: 
· You passed out (lost consciousness). · You have severe trouble breathing. · You have a very slow heartbeat (less than 60 beats a minute). · You have a low body temperature (95°F or below). Call your doctor now or seek immediate medical care if: 
· You feel tired, sluggish, or weak. · You have trouble remembering things or concentrating. · You do not begin to feel better 2 weeks after starting your medicine. Watch closely for changes in your health, and be sure to contact your doctor if you have any problems. Where can you learn more? Go to http://danika-sarah.info/. Enter V021 in the search box to learn more about \"Hypothyroidism: Care Instructions. \" Current as of: July 28, 2016 Content Version: 11.3 © 6915-1906 Ondango, [employer]. Care instructions adapted under license by [employer] (which disclaims liability or warranty for this information). If you have questions about a medical condition or this instruction, always ask your healthcare professional. [place] [address] any warranty or liability for your use of this information. [facility], [facility] [address] [facility], [address] [facility] [employer]  
[address] [place], [address] Phone [phone] Fax [phone] [employer]. [address] Orlando Huggins, [facility] counselor, substance abuse counselor [address] [place], [address] [place] [address] Phone: [phone] April Acevedo, Ph.D. 
350 [facility] Phone: [phone] FAX: [phone] 
[address] Phone: [phone] FAX: [phone] 
201 [facility], [address] [facility] 7 [address] [employer] [phone] FAX: [phone] [address] [facility] [place], [address] [facility] [phone] FAX: [phone] [facility], [address] [facility], 1256 Located within [facility] [address] [place], [address] 
[phone] [facility] [place] [facility] Brendon Jordan, Ph.D 
[address] [facility] [address] 102 [facility], [address] [facility] [address]  
[phone] [facility] [address] 1099 [facility], [address] 
[phone] [facility] Office [address] [facility], 15 [facility] 
[phone] Reed Rasheed, [address] [place] [address], [address] [place] [address], [address] [phone] [facility] Jose Aquino ([place] near [facility]) [phone] [address] Location 1230 [facility] [address] [place] [address], [address] 
[phone] Introducing [facility] [place] [facility]! Dear Jessica Goldstein: Thank you for requesting a [employer] account. Our records indicate that you already have an active [employer] account. You can access your account anytime at [email or web address] Did you know that you can access your hospital and ER discharge instructions at any time in [employer]? You can also review all of your test results from your hospital stay or ER visit. Additional Information If you have questions, please visit the Frequently Asked Questions section of the [employer] website at [email or web address] Remember, [employer] is NOT to be used for urgent needs. For medical emergencies, dial 911. Now available from your iPhone and Android! Please provide this summary of care documentation to your next provider. Your primary care clinician is listed as Keyla Car. If you have any questions after today's visit, please call [phone].

## 2017-09-13 LAB
CHOLEST SERPL-MCNC: 159 MG/DL (ref 100–199)
HDLC SERPL-MCNC: 48 MG/DL
HIV 1+2 AB+HIV1 P24 AG SERPL QL IA: NON REACTIVE
INTERPRETATION, 910389: NORMAL
LDLC SERPL CALC-MCNC: 98 MG/DL (ref 0–99)
THYROGLOB AB SERPL-ACNC: <1 IU/ML (ref 0–0.9)
THYROPEROXIDASE AB SERPL-ACNC: 9 IU/ML (ref 0–34)
TRIGL SERPL-MCNC: 66 MG/DL (ref 0–149)
TSH SERPL DL<=0.005 MIU/L-ACNC: 2.2 UIU/ML (ref 0.45–4.5)
VLDLC SERPL CALC-MCNC: 13 MG/DL (ref 5–40)

## 2017-09-18 ENCOUNTER — PATIENT OUTREACH (OUTPATIENT)
Dept: FAMILY MEDICINE CLINIC | Age: 42
End: 2017-09-18

## 2017-09-18 NOTE — PROGRESS NOTES
NN ANA Note    Outbound call to patient today to follow-up for an Ed visit to [facility] on 9/17/17 for a fall. Patient states, \"I fell out of my chair, I have had a lot of nausea and felt bad. I already had an appointment set up for my OB-GYN for today, so I went and found out  that I was pregnant. \" Patient explained how this was a special day for her how she had also found out today of the pregnancy. Very excited about the pregnancy. NN did medication review with patient. Patient says she lives a long way from this practice and was being seen here for management of her thyroid. Results of recent labs had been sent by PCP. Decline to make a follow up visit at this time. Case management Plan:  NN will continue to attempt contacts with patient by telephone or during office visit within next 7-10 days. Will continue to follow as necessary for the remaining 30 days post hospital visit and will reassess to see if CCM assessment is needed before discharge.

## 2017-10-01 ENCOUNTER — HOSPITAL ENCOUNTER (EMERGENCY)
Age: 42
Discharge: HOME | End: 2017-10-01
Attending: EMERGENCY MEDICINE
Payer: COMMERCIAL

## 2017-10-01 VITALS
BODY MASS INDEX: 28.08 KG/M2 | DIASTOLIC BLOOD PRESSURE: 88 MMHG | WEIGHT: 189.6 LBS | RESPIRATION RATE: 16 BRPM | OXYGEN SATURATION: 100 % | HEIGHT: 69 IN | TEMPERATURE: 98.7 F | SYSTOLIC BLOOD PRESSURE: 122 MMHG | HEART RATE: 98 BPM

## 2017-10-01 DIAGNOSIS — K64.9 HEMORRHOIDS, UNSPECIFIED HEMORRHOID TYPE: ICD-10-CM

## 2017-10-01 DIAGNOSIS — K59.00 CONSTIPATION, UNSPECIFIED CONSTIPATION TYPE: Primary | ICD-10-CM

## 2017-10-01 LAB
APPEARANCE UR: CLEAR
BACTERIA URNS QL MICRO: ABNORMAL /HPF
BILIRUB UR QL: NEGATIVE
COLOR UR: ABNORMAL
EPITH CASTS URNS QL MICRO: ABNORMAL /LPF
GLUCOSE UR STRIP.AUTO-MCNC: NEGATIVE MG/DL
HCG UR QL: POSITIVE
HGB UR QL STRIP: NEGATIVE
HYALINE CASTS URNS QL MICRO: ABNORMAL /LPF (ref 0–5)
KETONES UR QL STRIP.AUTO: NEGATIVE MG/DL
LEUKOCYTE ESTERASE UR QL STRIP.AUTO: NEGATIVE
NITRITE UR QL STRIP.AUTO: NEGATIVE
PH UR STRIP: 7.5 [PH] (ref 5–8)
PROT UR STRIP-MCNC: NEGATIVE MG/DL
RBC #/AREA URNS HPF: ABNORMAL /HPF (ref 0–5)
SP GR UR REFRACTOMETRY: 1.03 (ref 1–1.03)
UA: UC IF INDICATED,UAUC: ABNORMAL
UROBILINOGEN UR QL STRIP.AUTO: 1 EU/DL (ref 0.2–1)
WBC URNS QL MICRO: ABNORMAL /HPF (ref 0–4)

## 2017-10-01 PROCEDURE — 87086 URINE CULTURE/COLONY COUNT: CPT | Performed by: EMERGENCY MEDICINE

## 2017-10-01 PROCEDURE — 74011250637 HC RX REV CODE- 250/637: Performed by: EMERGENCY MEDICINE

## 2017-10-01 PROCEDURE — 81025 URINE PREGNANCY TEST: CPT | Performed by: EMERGENCY MEDICINE

## 2017-10-01 PROCEDURE — 81001 URINALYSIS AUTO W/SCOPE: CPT | Performed by: EMERGENCY MEDICINE

## 2017-10-01 PROCEDURE — 99283 EMERGENCY DEPT VISIT LOW MDM: CPT

## 2017-10-01 RX ORDER — FACIAL-BODY WIPES
10 EACH TOPICAL DAILY
Qty: 5 EACH | Refills: 0 | Status: SHIPPED | OUTPATIENT
Start: 2017-10-01 | End: 2017-10-01

## 2017-10-01 RX ORDER — FACIAL-BODY WIPES
10 EACH TOPICAL ONCE
Status: COMPLETED | OUTPATIENT
Start: 2017-10-01 | End: 2017-10-01

## 2017-10-01 RX ORDER — POLYETHYLENE GLYCOL 3350 17 G/17G
17 POWDER, FOR SOLUTION ORAL DAILY
Qty: 238 G | Refills: 0 | Status: SHIPPED | OUTPATIENT
Start: 2017-10-01 | End: 2017-10-01

## 2017-10-01 RX ORDER — POLYETHYLENE GLYCOL 3350 17 G/17G
17 POWDER, FOR SOLUTION ORAL DAILY
Status: DISCONTINUED | OUTPATIENT
Start: 2017-10-01 | End: 2017-10-01 | Stop reason: HOSPADM

## 2017-10-01 RX ORDER — MAGNESIUM CITRATE
296 SOLUTION, ORAL ORAL
Qty: 1 BOTTLE | Refills: 0 | Status: SHIPPED | OUTPATIENT
Start: 2017-10-01 | End: 2017-10-01

## 2017-10-01 RX ORDER — MAGNESIUM CITRATE
296 SOLUTION, ORAL ORAL
Status: COMPLETED | OUTPATIENT
Start: 2017-10-01 | End: 2017-10-01

## 2017-10-01 RX ORDER — POLYETHYLENE GLYCOL 3350 17 G/17G
17 POWDER, FOR SOLUTION ORAL DAILY
Qty: 238 G | Refills: 0 | Status: SHIPPED | OUTPATIENT
Start: 2017-10-01 | End: 2017-10-05

## 2017-10-01 RX ORDER — FACIAL-BODY WIPES
10 EACH TOPICAL DAILY
Qty: 5 EACH | Refills: 0 | Status: SHIPPED | OUTPATIENT
Start: 2017-10-01 | End: 2018-01-01

## 2017-10-01 RX ADMIN — POLYETHYLENE GLYCOL 3350 17 G: 17 POWDER, FOR SOLUTION ORAL at 18:58

## 2017-10-01 RX ADMIN — MAGESIUM CITRATE 296 ML: 1.75 LIQUID ORAL at 18:58

## 2017-10-01 RX ADMIN — BISACODYL 10 MG: 10 SUPPOSITORY RECTAL at 20:02

## 2017-10-01 NOTE — ED NOTES
Assumed care of pt from triage. Pt reports pain from hemorrhoids and constipation x4-5 days. Also notes she is pregnant. Positioned for comfort on stretcher. Call bell within reach. Visitor bedside.

## 2017-10-01 NOTE — ED PROVIDER NOTES
[facility].  EMERGENCY DEPARTMENT HISTORY AND PHYSICAL EXAM       Date of Service: 10/1/2017   Patient Name: Hood Lopez   YOB: 1975  Medical Record Number: [id number]    History of Presenting Illness     Chief Complaint   Patient presents with    Constipation     ambulatory into triage; pt reports last BM \"almost a week and a half ago\" pt is 9 weeks pregnant, pt of Dr. Madelaine Lopez; pt reports enema attempted at home and \"unable to pass\"; pt denies vaginal bleeding/spotting        History Provided By:  patient    Additional History:   Hood Lopez is a 39 y.o. female (/A0) who presents ambulatory and 7 weeks pregnant to the ED with cc of constipation x 6-7 days. She also c/o associated nausea and progressively worsening sharp lower abdominal pain. She states that she typically has 1 bowel movement per day, but her last normal bowel movement was 6-7 days ago. She states that she attempted an enema with no relief. She reports that she is able to produce flatus. She notes that she is taking prenatal vitamins. She specifically denies vomiting, dysuria or other complaints at this time. Social Hx: - Tobacco, - EtOH, - Illicit Drugs    There are no other complaints, changes or physical findings at this time. Primary Care Provider: Froylan Davis MD   Specialist: Nanci Thibodeaux MD (OB/GYN)    Past History     Past Medical History:   Past Medical History:   Diagnosis Date    Anemia     Asthma     Fall 2006    fall at work and injured back    Hypothyroidism     hypothyriod     Vitamin D deficiency         Past Surgical History:   Past Surgical History:   Procedure Laterality Date    HX HERNIA REPAIR  10/07/2016    open ventral hernia repair by Dr. Jorge L Holland.     HX HERNIA REPAIR  10/07/2016    open ventral hernia repair by Dr Nicholas Adams Right 2015    had to rebrake because u=it healed wrong        Family History:   Family History Problem Relation Age of Onset    Heart Disease Maternal Grandmother     Stroke Maternal Grandmother         Social History:   Social History   Substance Use Topics    Smoking status: Never Smoker    Smokeless tobacco: Never Used    Alcohol use 0.0 oz/week      Comment: Socially. Allergies: Allergies   Allergen Reactions    Latex Swelling    Latex Rash    Onion Anaphylaxis    Oxycodone Anaphylaxis and Shortness of Breath    Tomato Anaphylaxis    Egg Rash    Hydrocodone Shortness of Breath and Itching    Mushroom Rash    Shellfish Derived Swelling         Review of Systems   Review of Systems   Constitutional: Negative. Negative for chills and fever. HENT: Negative. Negative for congestion, facial swelling, rhinorrhea, sore throat, trouble swallowing and voice change. Respiratory: Negative. Negative for apnea, cough, chest tightness and shortness of breath. Cardiovascular: Negative for chest pain, palpitations and leg swelling. Gastrointestinal: Positive for abdominal pain, constipation and nausea. Negative for abdominal distention, diarrhea and vomiting. Genitourinary: Negative. Negative for difficulty urinating, dysuria, frequency, hematuria and urgency. Musculoskeletal: Negative. Negative for arthralgias, back pain and myalgias. Skin: Negative for rash. Neurological: Negative for dizziness, facial asymmetry, weakness, light-headedness, numbness and headaches. Psychiatric/Behavioral: Negative. Physical Exam  Physical Exam   Constitutional: She is oriented to person, place, and time. She appears well-developed and well-nourished. No distress. HENT:   Head: Normocephalic and atraumatic. Mouth/Throat: Oropharynx is clear and moist. No oropharyngeal exudate. Eyes: Conjunctivae and EOM are normal. Pupils are equal, round, and reactive to light. Neck: Normal range of motion. Cardiovascular: Normal rate, regular rhythm and normal heart sounds.   Exam reveals no gallop and no friction rub. No murmur heard. Pulmonary/Chest: Effort normal and breath sounds normal. No respiratory distress. She has no wheezes. She has no rales. She exhibits no tenderness. Abdominal: Soft. Bowel sounds are normal. She exhibits no distension and no mass. There is no tenderness. There is no rebound and no guarding. Genitourinary:   Genitourinary Comments: Rectal exam: Two large pedunculated hemorrhoids seen. Hemorrhoids are non-thrombosed and are ~ 3 cm in diameter. TTP. No active bleeding. Musculoskeletal: Normal range of motion. She exhibits no edema, tenderness or deformity. Neurological: She is alert and oriented to person, place, and time. She displays normal reflexes. No cranial nerve deficit. She exhibits normal muscle tone. Coordination normal.   Skin: Skin is warm. No rash noted. She is not diaphoretic. Psychiatric: She has a normal mood and affect. Nursing note and vitals reviewed. Medical Decision Making   I am the first provider for this patient. I reviewed the vital signs, available nursing notes, past medical history, past surgical history, family history and social history. Old Medical Records: Old medical records. Nursing notes. Provider Notes:   39 y.o. F who is 7 weeks pregnant presents with constipation and reported nausea, but no other obstructive symptoms. Rectal exam without hard stool, but significant non-thrombosed hemorrhoids are present. Will order KUB to rule out obstruction given nausea. Will check UA and order stool softener and suppository. ED Course:  6:35 PM   Initial assessment performed. The patients presenting problems have been discussed, and they are in agreement with the care plan formulated and outlined with them. I have encouraged them to ask questions as they arise throughout their visit.     Procedure Note - Rectal Exam:   6:39 PM  Performed by: Denisse Ramirez MD  Chaperoned by: ED RN  Rectal exam performed. No stool was collected. Stool was collected and sent to the lab for Hemoccult testing. The procedure took 1-15 minutes, and pt tolerated well. Progress Note:  6:40 PM  The patient was encouraged to take SIDS baths, apply hemorrhoid cream, and take Miralax/Magensium Citrate to alleviate her symptoms. She is in agreement with this plan. Pt was offered a KUB, and she is in agreement with this test at this time. Written by Raf Fletcher, ED Scribe, as dictated by King Madie MD.    Progress Note:  8:01 PM  The patient declines xray at this time. Will discharge. Written by Raf Fletcher ED Scribe, as dictated by King Madie MD.    Diagnostic Study Results     Labs -      Recent Results (from the past 12 hour(s))   URINALYSIS W/ REFLEX CULTURE    Collection Time: 10/01/17  6:59 PM   Result Value Ref Range    Color YELLOW/STRAW      Appearance CLEAR CLEAR      Specific gravity 1.026 1.003 - 1.030      pH (UA) 7.5 5.0 - 8.0      Protein NEGATIVE  NEG mg/dL    Glucose NEGATIVE  NEG mg/dL    Ketone NEGATIVE  NEG mg/dL    Bilirubin NEGATIVE  NEG      Blood NEGATIVE  NEG      Urobilinogen 1.0 0.2 - 1.0 EU/dL    Nitrites NEGATIVE  NEG      Leukocyte Esterase NEGATIVE  NEG      WBC 0-4 0 - 4 /hpf    RBC 0-5 0 - 5 /hpf    Epithelial cells FEW FEW /lpf    Bacteria 1+ (A) NEG /hpf    UA:UC IF INDICATED URINE CULTURE ORDERED (A) CNI      Hyaline cast 0-2 0 - 5 /lpf   HCG URINE, QL    Collection Time: 10/01/17  6:59 PM   Result Value Ref Range    HCG urine, Ql. POSITIVE (A) NEG           Vital Signs-Reviewed the patient's vital signs.    Patient Vitals for the past 12 hrs:   Temp Pulse Resp BP SpO2   10/01/17 1738 98.7 °F (37.1 °C) 98 16 122/88 100 %       Medications Given in the ED:  Medications   polyethylene glycol (MIRALAX) packet 17 g (17 g Oral Given 10/1/17 1858)   magnesium citrate solution 296 mL (296 mL Oral Given 10/1/17 1858)   bisacodyl (DULCOLAX) suppository 10 mg (10 mg Rectal Given 10/1/17 2002) Diagnosis   Clinical Impression:   1. Constipation, unspecified constipation type    2. Hemorrhoids, unspecified hemorrhoid type         Plan:  1:   Follow-up Information     Follow up With Details Comments Contact Info    Jose Garibay MD  As needed, If symptoms worsen [address]  [phone]      [facility]  As needed, If symptoms worsen [address] [facility]  [phone]        2:   Current Discharge Medication List      START taking these medications    Details   magnesium citrate solution Take 296 mL by mouth now for 1 dose. Qty: 1 Bottle, Refills: 0      polyethylene glycol (MIRALAX) 17 gram/dose powder Take 17 g by mouth daily. 1 tablespoon with 8 oz of water daily  Qty: 238 g, Refills: 0      phenyleph-shark germania oil-mo-pet (HEMORRHOID) rectal ointment by PeriANAL route two (2) times daily as needed for Hemorrhoids. Qty: 30 g, Refills: 0      bisacodyl (DULCOLAX, BISACODYL,) 10 mg suppository Insert 10 mg into rectum daily. Qty: 5 Each, Refills: 0         CONTINUE these medications which have NOT CHANGED    Details   amitriptyline (ELAVIL) 25 mg tablet Take 1 Tab by mouth nightly. Qty: 30 Tab, Refills: 3           Return to ED if Worse    Disposition Note:  Discharge Note:  8:00 PM  The pt is ready for discharge. The pt's signs, symptoms, diagnosis, and discharge instructions have been discussed and pt has conveyed their understanding. The pt is to follow up as recommended or return to ER should their symptoms worsen. Plan has been discussed and pt is in agreement. Attestations:     Saumya Medrano, attest that this documentation has been prepared under the direction and in the presence of Gina Pearson MD.  10/1/2017 6:44 PM    I, Gina Pearson MD, personally performed the services described in this documentation. All medical record entries made by the scribe were at my direction and in my presence.  I have reviewed the chart and discharge instructions and agree that the record reflects my personal performance and is accurate and complete.

## 2017-10-01 NOTE — LETTER
[address] 
[facility] EMERGENCY DEPT 
[address] [place] [address] [address] [id number] [phone] Work/School Note Date: 10/1/2017 To Whom It May concern: 
 
Cristina Morel was seen and treated today in the emergency room by the following provider(s): 
Attending Provider: Alexx Kirby MD.   
 
Cristina Morel may return to work on 10/3/2017. Sincerely, [address]

## 2017-10-01 NOTE — LETTER
[address] 
[facility] EMERGENCY DEPT 
[address] [address] 
[phone] Work/School Note Date: 10/1/2017 To Whom It May concern: 
 
Vito Hopson was seen and treated, and accompanied by Nahomy saul, today in the emergency room by the following provider(s): 
Attending Provider: Lindsey Solis MD.   
 
 
 
Sincerely, [address]

## 2017-10-01 NOTE — ED NOTES
Assumed care of pt from Cory Rawls RN. Pt resting quietly and in no acute distress at this time. Call bell within reach.

## 2017-10-01 NOTE — DISCHARGE INSTRUCTIONS
Constipation: Care Instructions  Your Care Instructions  Constipation means that you have a hard time passing stools (bowel movements). People pass stools from 3 times a day to once every 3 days. What is normal for you may be different. Constipation may occur with pain in the rectum and cramping. The pain may get worse when you try to pass stools. Sometimes there are small amounts of bright red blood on toilet paper or the surface of stools. This is because of enlarged veins near the rectum (hemorrhoids). A few changes in your diet and lifestyle may help you avoid ongoing constipation. Your doctor may also prescribe medicine to help loosen your stool. Some medicines can cause constipation. These include pain medicines and antidepressants. Tell your doctor about all the medicines you take. Your doctor may want to make a medicine change to ease your symptoms. Follow-up care is a key part of your treatment and safety. Be sure to make and go to all appointments, and call your doctor if you are having problems. It's also a good idea to know your test results and keep a list of the medicines you take. How can you care for yourself at home? · Drink plenty of fluids, enough so that your urine is light yellow or clear like water. If you have kidney, heart, or liver disease and have to limit fluids, talk with your doctor before you increase the amount of fluids you drink. · Include high-fiber foods in your diet each day. These include fruits, vegetables, beans, and whole grains. · Get at least 30 minutes of exercise on most days of the week. Walking is a good choice. You also may want to do other activities, such as running, swimming, cycling, or playing tennis or team sports. · Take a fiber supplement, such as Citrucel or Metamucil, every day. Read and follow all instructions on the label. · Schedule time each day for a bowel movement. A daily routine may help.  Take your time having your bowel movement. · Support your feet with a small step stool when you sit on the toilet. This helps flex your hips and places your pelvis in a squatting position. · Your doctor may recommend an over-the-counter laxative to relieve your constipation. Examples are Milk of Magnesia and MiraLax. Read and follow all instructions on the label. Do not use laxatives on a long-term basis. When should you call for help? Call your doctor now or seek immediate medical care if:  · You have new or worse belly pain. · You have new or worse nausea or vomiting. · You have blood in your stools. Watch closely for changes in your health, and be sure to contact your doctor if:  · Your constipation is getting worse. · You do not get better as expected. Where can you learn more? Go to http://danika-sarah.info/. Enter 21 [phone] in the search box to learn more about \"Constipation: Care Instructions. \"  Current as of: March 20, 2017  Content Version: 11.3  © 4191-9612 [employer]. Care instructions adapted under license by [employer] (which disclaims liability or warranty for this information). If you have questions about a medical condition or this instruction, always ask your healthcare professional. [place] [address] any warranty or liability for your use of this information.

## 2017-10-01 NOTE — ED NOTES
Bedside and Verbal shift change report given to Sandhya (oncoming nurse) by [facility] (offgoing nurse). Report included the following information SBAR.

## 2017-10-03 LAB
BACTERIA SPEC CULT: NORMAL
CC UR VC: NORMAL
SERVICE CMNT-IMP: NORMAL

## 2017-10-05 ENCOUNTER — ANESTHESIA EVENT (OUTPATIENT)
Dept: SURGERY | Age: 42
End: 2017-10-05
Payer: COMMERCIAL

## 2017-10-05 NOTE — PERIOP NOTES
[facility]  Ambulatory Surgery Unit  Pre-operative Instructions    Surgery/Procedure Date  10/6/2017            Tentative Arrival Time 0615      1. On the day of your surgery/procedure, please report to the Ambulatory Surgery Unit Registration Desk and sign in at your designated time. The Ambulatory Surgery Unit is located in [facility] on the [facility] side of the [facility] across from the [address] [place] [address]. Please have all of your health insurance cards and a photo ID. 2. You must have someone with you to drive you home, as you should not drive a car for 24 hours following anesthesia. Please make arrangements for a responsible adult friend or family member to stay with you for at least the first 24 hours after your surgery. 3. Do not have anything to eat or drink (including water, gum, mints, coffee, juice) after midnight   10/5/2017. This may not apply to medications prescribed by your physician. (Please note below the special instructions with medications to take the morning of surgery, if applicable.)    4. We recommend you do not drink any alcoholic beverages for 24 hours before and after your surgery. 5. Contact your surgeons office for instructions on the following medications: non-steroidal anti-inflammatory drugs (i.e. Advil, Aleve), vitamins, and supplements. (Some surgeons will want you to stop these medications prior to surgery and others may allow you to take them)   **If you are currently taking Plavix, Coumadin, Aspirin and/or other blood-thinning agents, contact your surgeon for instructions. ** Your surgeon will partner with the physician prescribing these medications to determine if it is safe to stop or if you need to continue taking. Please do not stop taking these medications without instructions from your surgeon.     6. In an effort to help prevent surgical site infection, we ask that you shower with an anti-bacterial soap (i.e. Dial or Safeguard) for 3 days prior to and on the morning of surgery, using a fresh towel after each shower. (Please begin this process with fresh bed linens.) Do not apply any lotions, powders, or deodorants after the shower on the day of your procedure. If applicable, please do not shave the operative site for 48 hours prior to surgery. 7. Wear comfortable clothes. Wear glasses instead of contacts. Do not bring any jewelry or money (other than copays or fees as instructed). Do not wear make-up, particularly mascara, the morning of your surgery. Do not wear nail polish, particularly if you are having foot /hand surgery. Wear your hair loose or down, no ponytails, buns, josé luis pins or clips. All body piercings must be removed. 8. You should understand that if you do not follow these instructions your surgery may be cancelled. If your physical condition changes (i.e. fever, cold or flu) please contact your surgeon as soon as possible. 9. It is important that you be on time. If a situation occurs where you may be late, or if you have any questions or problems, please call [phone].    10. Your surgery time may be subject to change. You will receive a phone call the day prior to surgery to confirm your arrival time. 11. Pediatric patients: please bring a change of clothes, diapers, bottle/sippy cup, pacifier, etc.            I understand a pre-operative phone call will be made to verify my surgery time. In the event that I am not available, I give permission for a message to be left on my answering service and/or with another person?       yes         _Patient verbalized understanding__________________      ___________________      ________________  (Signature of Patient)          (Witness)                   (Date and Time)

## 2017-10-06 ENCOUNTER — ANESTHESIA (OUTPATIENT)
Dept: SURGERY | Age: 42
End: 2017-10-06
Payer: COMMERCIAL

## 2017-10-06 ENCOUNTER — HOSPITAL ENCOUNTER (OUTPATIENT)
Age: 42
Setting detail: OUTPATIENT SURGERY
Discharge: HOME | End: 2017-10-06
Attending: OBSTETRICS & GYNECOLOGY | Admitting: OBSTETRICS & GYNECOLOGY
Payer: COMMERCIAL

## 2017-10-06 VITALS
HEART RATE: 71 BPM | OXYGEN SATURATION: 100 % | BODY MASS INDEX: 27.11 KG/M2 | TEMPERATURE: 98 F | DIASTOLIC BLOOD PRESSURE: 80 MMHG | HEIGHT: 70 IN | WEIGHT: 189.4 LBS | RESPIRATION RATE: 19 BRPM | SYSTOLIC BLOOD PRESSURE: 109 MMHG

## 2017-10-06 PROCEDURE — 76030000002 HC AMB SURG OR TIME FIRST 0.: Performed by: OBSTETRICS & GYNECOLOGY

## 2017-10-06 PROCEDURE — 76210000040 HC AMBSU PH I REC FIRST 0.5 HR: Performed by: OBSTETRICS & GYNECOLOGY

## 2017-10-06 PROCEDURE — 76210000046 HC AMBSU PH II REC FIRST 0.5 HR: Performed by: OBSTETRICS & GYNECOLOGY

## 2017-10-06 PROCEDURE — 77030018836 HC SOL IRR NACL ICUM -A: Performed by: OBSTETRICS & GYNECOLOGY

## 2017-10-06 PROCEDURE — 77030008578 HC TBNG UTER SUC BUSS -A: Performed by: OBSTETRICS & GYNECOLOGY

## 2017-10-06 PROCEDURE — 77030011210: Performed by: OBSTETRICS & GYNECOLOGY

## 2017-10-06 PROCEDURE — 76060000073 HC AMB SURG ANES FIRST 0.5 HR: Performed by: OBSTETRICS & GYNECOLOGY

## 2017-10-06 PROCEDURE — 74011250636 HC RX REV CODE- 250/636

## 2017-10-06 PROCEDURE — 77030020255 HC SOL INJ LR 1000ML BG: Performed by: OBSTETRICS & GYNECOLOGY

## 2017-10-06 PROCEDURE — 88305 TISSUE EXAM BY PATHOLOGIST: CPT | Performed by: OBSTETRICS & GYNECOLOGY

## 2017-10-06 PROCEDURE — 77030030437 HC FLTR UTER VAC OCOA -A: Performed by: OBSTETRICS & GYNECOLOGY

## 2017-10-06 PROCEDURE — 74011000250 HC RX REV CODE- 250: Performed by: OBSTETRICS & GYNECOLOGY

## 2017-10-06 PROCEDURE — 74011250636 HC RX REV CODE- 250/636: Performed by: ANESTHESIOLOGY

## 2017-10-06 PROCEDURE — 77030003666 HC NDL SPINAL BD -A: Performed by: OBSTETRICS & GYNECOLOGY

## 2017-10-06 RX ORDER — FENTANYL CITRATE 50 UG/ML
25 INJECTION, SOLUTION INTRAMUSCULAR; INTRAVENOUS
Status: DISCONTINUED | OUTPATIENT
Start: 2017-10-06 | End: 2017-10-06 | Stop reason: HOSPADM

## 2017-10-06 RX ORDER — LIDOCAINE HYDROCHLORIDE 10 MG/ML
0.1 INJECTION, SOLUTION EPIDURAL; INFILTRATION; INTRACAUDAL; PERINEURAL AS NEEDED
Status: DISCONTINUED | OUTPATIENT
Start: 2017-10-06 | End: 2017-10-06 | Stop reason: HOSPADM

## 2017-10-06 RX ORDER — DIPHENHYDRAMINE HYDROCHLORIDE 50 MG/ML
12.5 INJECTION, SOLUTION INTRAMUSCULAR; INTRAVENOUS AS NEEDED
Status: DISCONTINUED | OUTPATIENT
Start: 2017-10-06 | End: 2017-10-06 | Stop reason: HOSPADM

## 2017-10-06 RX ORDER — ACETAMINOPHEN 10 MG/ML
1000 INJECTION, SOLUTION INTRAVENOUS ONCE
Status: COMPLETED | OUTPATIENT
Start: 2017-10-06 | End: 2017-10-06

## 2017-10-06 RX ORDER — MIDAZOLAM HYDROCHLORIDE 1 MG/ML
INJECTION, SOLUTION INTRAMUSCULAR; INTRAVENOUS AS NEEDED
Status: DISCONTINUED | OUTPATIENT
Start: 2017-10-06 | End: 2017-10-06 | Stop reason: HOSPADM

## 2017-10-06 RX ORDER — HYDROMORPHONE HYDROCHLORIDE 1 MG/ML
.2-.5 INJECTION, SOLUTION INTRAMUSCULAR; INTRAVENOUS; SUBCUTANEOUS ONCE
Status: DISCONTINUED | OUTPATIENT
Start: 2017-10-06 | End: 2017-10-06 | Stop reason: HOSPADM

## 2017-10-06 RX ORDER — SODIUM CHLORIDE 0.9 % (FLUSH) 0.9 %
5-10 SYRINGE (ML) INJECTION AS NEEDED
Status: DISCONTINUED | OUTPATIENT
Start: 2017-10-06 | End: 2017-10-06 | Stop reason: HOSPADM

## 2017-10-06 RX ORDER — KETOROLAC TROMETHAMINE 30 MG/ML
INJECTION, SOLUTION INTRAMUSCULAR; INTRAVENOUS AS NEEDED
Status: DISCONTINUED | OUTPATIENT
Start: 2017-10-06 | End: 2017-10-06 | Stop reason: HOSPADM

## 2017-10-06 RX ORDER — ONDANSETRON 2 MG/ML
INJECTION INTRAMUSCULAR; INTRAVENOUS AS NEEDED
Status: DISCONTINUED | OUTPATIENT
Start: 2017-10-06 | End: 2017-10-06 | Stop reason: HOSPADM

## 2017-10-06 RX ORDER — SODIUM CHLORIDE, SODIUM LACTATE, POTASSIUM CHLORIDE, CALCIUM CHLORIDE 600; 310; 30; 20 MG/100ML; MG/100ML; MG/100ML; MG/100ML
25 INJECTION, SOLUTION INTRAVENOUS CONTINUOUS
Status: DISCONTINUED | OUTPATIENT
Start: 2017-10-06 | End: 2017-10-06 | Stop reason: HOSPADM

## 2017-10-06 RX ORDER — ACETAMINOPHEN 10 MG/ML
INJECTION, SOLUTION INTRAVENOUS
Status: COMPLETED
Start: 2017-10-06 | End: 2017-10-06

## 2017-10-06 RX ORDER — DOXYCYCLINE HYCLATE 100 MG
100 TABLET ORAL 2 TIMES DAILY
Qty: 14 TAB | Refills: 0 | Status: SHIPPED | OUTPATIENT
Start: 2017-10-06 | End: 2018-01-01

## 2017-10-06 RX ORDER — PROPOFOL 10 MG/ML
INJECTION, EMULSION INTRAVENOUS
Status: DISCONTINUED | OUTPATIENT
Start: 2017-10-06 | End: 2017-10-06 | Stop reason: HOSPADM

## 2017-10-06 RX ORDER — MEPERIDINE HYDROCHLORIDE 50 MG/1
50 TABLET ORAL
Qty: 30 TAB | Refills: 0 | Status: SHIPPED | OUTPATIENT
Start: 2017-10-06 | End: 2018-01-01

## 2017-10-06 RX ORDER — LIDOCAINE HYDROCHLORIDE 10 MG/ML
INJECTION, SOLUTION EPIDURAL; INFILTRATION; INTRACAUDAL; PERINEURAL AS NEEDED
Status: DISCONTINUED | OUTPATIENT
Start: 2017-10-06 | End: 2017-10-06 | Stop reason: HOSPADM

## 2017-10-06 RX ORDER — SODIUM CHLORIDE 0.9 % (FLUSH) 0.9 %
5-10 SYRINGE (ML) INJECTION EVERY 8 HOURS
Status: DISCONTINUED | OUTPATIENT
Start: 2017-10-06 | End: 2017-10-06 | Stop reason: HOSPADM

## 2017-10-06 RX ORDER — MEPERIDINE HYDROCHLORIDE 50 MG/1
50 TABLET ORAL
Status: DISCONTINUED | OUTPATIENT
Start: 2017-10-06 | End: 2017-10-06 | Stop reason: HOSPADM

## 2017-10-06 RX ORDER — DOXYCYCLINE HYCLATE 100 MG
100 TABLET ORAL EVERY 12 HOURS
Status: DISCONTINUED | OUTPATIENT
Start: 2017-10-06 | End: 2017-10-06 | Stop reason: HOSPADM

## 2017-10-06 RX ADMIN — ACETAMINOPHEN 1000 MG: 10 INJECTION, SOLUTION INTRAVENOUS at 08:12

## 2017-10-06 RX ADMIN — KETOROLAC TROMETHAMINE 30 MG: 30 INJECTION, SOLUTION INTRAMUSCULAR; INTRAVENOUS at 07:54

## 2017-10-06 RX ADMIN — SODIUM CHLORIDE, SODIUM LACTATE, POTASSIUM CHLORIDE, AND CALCIUM CHLORIDE 25 ML/HR: 600; 310; 30; 20 INJECTION, SOLUTION INTRAVENOUS at 06:44

## 2017-10-06 RX ADMIN — PROPOFOL 120 MCG/KG/MIN: 10 INJECTION, EMULSION INTRAVENOUS at 07:42

## 2017-10-06 RX ADMIN — MIDAZOLAM HYDROCHLORIDE 2 MG: 1 INJECTION, SOLUTION INTRAMUSCULAR; INTRAVENOUS at 07:37

## 2017-10-06 RX ADMIN — ONDANSETRON 4 MG: 2 INJECTION INTRAMUSCULAR; INTRAVENOUS at 07:48

## 2017-10-06 NOTE — IP AVS SNAPSHOT
[address] [facility] 
[phone] Patient: Frank Cabral MRN: [id number] :1975 You are allergic to the following Allergen Reactions Latex Swelling Latex Rash Onion Anaphylaxis Oxycodone Anaphylaxis Shortness of Breath Tomato Anaphylaxis Egg Rash Hydrocodone Shortness of Breath Itching Mushroom Rash Shellfish Derived Swelling Recent Documentation Height Weight BMI OB Status Smoking Status 1.772 m 85.9 kg 27.37 kg/m2 Having regular periods Never Smoker Emergency Contacts Name Discharge Info Relation Home Work [facility] DISCHARGE CAREGIVER [3] Parent [1] [phone] About your hospitalization You were admitted on:  2017 You last received care in the:  [facility] PACU You were discharged on:  2017 Unit phone number:  [phone] Why you were hospitalized Your primary diagnosis was:  Not on File Providers Seen During Your Hospitalizations Provider Role Specialty Primary office phone Ani Umaña MD Attending Provider Obstetrics & Gynecology [phone] Your Primary Care Physician (PCP) Primary Care Physician Office Phone Office Fax Jacey Barkley [phone] Follow-up Information Follow up With Details Comments Contact Info Misty Carbajal MD   [place] [address] 
[phone] Current Discharge Medication List  
  
START taking these medications Dose & Instructions Dispensing Information Comments Morning Noon Evening Bedtime  
 doxycycline 100 mg tablet Commonly known as:  VIBRA-TABS Your last dose was: Your next dose is:    
   
   
 Dose:  100 mg Take 1 Tab by mouth two (2) times a day. Quantity:  14 Tab Refills:  0 meperidine 50 mg tablet Commonly known as:  DEMEROL Your last dose was: Your next dose is:    
   
   
 Dose:  50 mg Take 1 Tab by mouth every four (4) hours as needed. Max Daily Amount: 300 mg. Quantity:  30 Tab Refills:  0 ASK your doctor about these medications Dose & Instructions Dispensing Information Comments Morning Noon Evening Bedtime  
 bisacodyl 10 mg suppository Commonly known as:  DULCOLAX (BISACODYL) Your last dose was: Your next dose is:    
   
   
 Dose:  10 mg Insert 10 mg into rectum daily. Quantity:  5 Each Refills:  0 Where to Get Your Medications Information on where to get these meds will be given to you by the nurse or doctor. ! Ask your nurse or doctor about these medications  
  doxycycline 100 mg tablet  
 meperidine 50 mg tablet Discharge Instructions PATIENT DISCHARGE INSTRUCTIONS PATIENT DISCHARGE INSTRUCTIONS Thang Marques / [id number] : 1975 Admitted 10/6/2017 Discharged: 10/6/2017 · It is important that you take the medication exactly as they are prescribed. · Keep your medication in the bottles provided by the pharmacist and keep a list of the medication names, dosages, and times to be taken in your wallet. · Do not take other medications without consulting your doctor. What to do at [facility] Recommended Diet: Regular Diet Recommended Activity: Activity as tolerated and no driving for today If you experience any of the following symptoms fever>101.4, please call [phone] Make an anayeli with  in 1 week Signed By: Fernanda Downing MD   
 2017 You received Toradol during your surgery.  You may not take any form of NSAIDS (non steroidal anti inflammatory drugs) such as Advil, Ibuprofen, Aleve, Motrin until 2:00pm. 
 
 You received an IV form of Tylenol 1000mg during your surgery, you may take tylenol (or pain medication containing Tylenol or Acetaminophen) in 6 hours at 2:15pm 
  
DO NOT TAKE TYLENOL/ACETAMINOPHEN WITH PERCOCET, [address], [address] TAKE NARCOTIC PAIN MEDICATIONS WITH FOOD Narcotics tend to be constipating, we suggest taking a stool softener such as Colace or Miralax (follow package instructions). DO NOT DRIVE WHILE TAKING NARCOTIC PAIN MEDICATIONS. DO NOT TAKE SLEEPING MEDICATIONS OR ANTIANXIETY MEDICATIONS WHILE TAKING NARCOTIC PAIN MEDICATIONS,  ESPECIALLY THE NIGHT OF ANESTHESIA. CPAP PATIENTS BE SURE TO WEAR MACHINE WHENEVER NAPPING OR SLEEPING. DISCHARGE SUMMARY from Nurse The following personal items collected during your admission are returned to you:  
Dental Appliance: Dental Appliances: None Vision: Visual Aid: Glasses (sent to [employer]) Hearing Aid:   
Jewelry: Jewelry: Ring (given to ) Clothing: Clothing: Footwear, Pants, Shirt, Socks, Undergarments, Jacket/Coat (in pt belonging) Other Valuables: Other Valuables: Eyeglasses (sent to pacu) Valuables sent to safe:   
 
 
PATIENT INSTRUCTIONS: 
 
 
B - Balance E - Eyes F-  Face looks uneven A-  Arms unable to move or move even S-  Speech slurred or non-existent T-  Time-call 911 as soon as signs and symptoms begin-DO NOT go Back to bed or wait to see if you get better-TIME IS BRAIN. If you have not received your influenza and/or pneumococcal vaccine, please follow up with your primary care physician. The discharge information has been reviewed with the patient and caregiver. The patient and caregiver verbalized understanding. Discharge Instructions Attachments/References MEPERIDINE (BY MOUTH) (ENGLISH) DOXYCYCLINE (BY MOUTH) (ENGLISH) Discharge Orders None Introducing [facility] SERVICES! Dear Derek Che: Thank you for requesting a [facility] account. Our records indicate that you already have an active [facility] account. You can access your account anytime at [email or web address] Did you know that you can access your hospital and ER discharge instructions at any time in [facility]? You can also review all of your test results from your hospital stay or ER visit. Additional Information If you have questions, please visit the Frequently Asked Questions section of the [employer] website at [email or web address] Remember, MyChart is NOT to be used for urgent needs. For medical emergencies, dial 911. Now available from your iPhone and Android! General Information Please provide this summary of care documentation to your next provider. Patient Signature:  ____________________________________________________________ Date:  ____________________________________________________________  
  
Tonio Rust Provider Signature:  ____________________________________________________________ Date:  ____________________________________________________________ More Information Meperidine (By mouth) Meperidine (me-PER-i-yue) Relieves moderate to severe pain. This medicine is a narcotic pain reliever. Brand Name(s): Demerol, Demerol Hydrochloride, Meperitab There may be other brand names for this medicine. When This Medicine Should Not Be Used: You should not use this medicine if you have had an allergic reaction to meperidine. You should not use this medicine if you have used an MAO inhibitor (MAOI) such as Eldepryl®, Marplan®, Nardil®, or Parnate® within the past 14 days. Do not use this medicine if you have severe lung or breathing problems (such as respiratory insufficiency). This medicine should not be used to relieve chronic (long-lasting or recurrent) pain. How to Use This Medicine:  
Liquid, Tablet · Your doctor will tell you how much medicine to use. Do not use more than directed. · Measure the oral liquid medicine with a marked measuring spoon, oral syringe, or medicine cup. · The oral liquid may cause your mouth to feel numb. Mix your medicine with a half glass of water to reduce the numbing effect. · Drink plenty of liquids to help avoid constipation. If a dose is missed: · Take a dose as soon as you remember. If it is almost time for your next dose, wait until then and take a regular dose. Do not take extra medicine to make up for a missed dose. How to Store and Dispose of This Medicine: · Store the medicine in a closed container at room temperature, away from heat, moisture, and direct light. Make sure you store the medicine in a safe and secure place to prevent others from getting it. · Destroy any medicine that you do not need by flushing it down the toilet. · Keep all medicine out of the reach of children. Never share your medicine with anyone. Drugs and Foods to Avoid: Ask your doctor or pharmacist before using any other medicine, including over-the-counter medicines, vitamins, and herbal products. · Make sure your doctor knows if you are using acyclovir (Zovirax®), cimetidine (Jerolyn Moots), phenytoin (Dilantin®), ritonavir (Norvir®), muscle relaxants (such as tubocurarine), narcotic pain medicines (such as buprenorphine, butorphanol, nalbuphine, pentazocine, Buprenex®, Nubain®, Stadol®, or Talwin®), or a phenothiazine medicine (such as prochlorperazine, Compazine®, Phenergan®, or Thorazine®). · Tell your doctor if you use anything else that makes you sleepy. Some examples are allergy medicine, narcotic pain medicine, and alcohol. · Do not drink alcohol while you are using this medicine. Warnings While Using This Medicine: · Make sure your doctor knows if you are pregnant or breastfeeding or if you have kidney disease, liver disease, breathing problems (such as COPD, respiratory depression, or asthma), Freedom disease, heart rhythm problems, low blood pressure, kyphoscoliosis (severe curvature of the spine with breathing problems), mental illness, pheochromocytoma (an adrenal gland tumor), prostate problems, sickle cell anemia, stomach or digestive problems, trouble urinating, or an underactive thyroid. Tell your doctor if you have had seizures, a head injury, a recent surgery, or have been addicted to alcohol or drugs. · This medicine can be habit-forming.  Do not use more than your prescribed dose. Call your doctor if you think your medicine is not working. · Do not stop using this medicine suddenly. Your doctor will need to slowly decrease your dose before you stop it completely. · Tell any doctor or dentist who treats you that you are using this medicine. You may need to stop using this medicine several days before you have surgery or medical tests. · This medicine may make you dizzy, drowsy, or lightheaded. Do not drive, use machines, or do anything else that could be dangerous until you know how this medicine affects you. Change positions slowly when you get up from a sitting or lying position. · This medicine may cause constipation, especially with long-term use. Ask your doctor if you should use a laxative to prevent and treat constipation. Possible Side Effects While Using This Medicine:  
Call your doctor right away if you notice any of these side effects: · Allergic reaction: Itching or hives, swelling in your face or hands, swelling or tingling in your mouth or throat, chest tightness, trouble breathing · Confusion, unusual thoughts, or hallucinations (seeing, hearing, or feeling things that are not really there) · Fast, slow, or uneven heartbeat · Lightheadedness, dizziness, or fainting · Seizures · Sleepiness or unusual drowsiness If you notice these less serious side effects, talk with your doctor: · Blurred vision · Nausea, vomiting, or constipation · Sweating · Warmth or redness in your face, neck, arms, or upper chest 
If you notice other side effects that you think are caused by this medicine, tell your doctor. Call your doctor for medical advice about side effects. You may report side effects to FDA at 1-325-FDA-4298 © 2017 [address] Information is for End User's use only and may not be sold, redistributed or otherwise used for commercial purposes. The above information is an  only.  It is not intended as medical advice for individual conditions or treatments. Talk to your doctor, nurse or pharmacist before following any medical regimen to see if it is safe and effective for you. Doxycycline (By mouth) Doxycycline (jlb-z-BZP-kleen) Treats and prevents infections. Also used to prevent malaria and treat rosacea or severe acne. This medicine is a tetracycline antibiotic. Brand Name(s): Acticlate, Adoxa, Adoxa Nick 1/150, Avidoxy, Avidoxy DK, BenzoDox 30 Kit, BenzoDox 60 Kit, Doryx, Doryx MPC, Monodox, Morgidox 6G519GO, Morgidox 1j302SS Kit, Morgidox 1x50MG, Morgidox 1x50MG Kit, Morgidox 3R902XT There may be other brand names for this medicine. When This Medicine Should Not Be Used: This medicine is not right for everyone. Do not use it if you had an allergic reaction to doxycycline or another tetracycline antibiotic, or if you are pregnant or breastfeeding. How to Use This Medicine:  
Capsule, Delayed Release Capsule, Long Acting Capsule, Liquid, Tablet, Delayed Release Tablet · Your doctor will tell you how much medicine to use. Do not use more than directed. · Ask your pharmacist or doctor if you need to take this medicine with or without food. Some forms can be taken with food or milk, but others must be taken on an empty stomach. · Tablets: You may take this medicine with food or milk to avoid stomach irritation. To break a tablet, hold the tablet between your thumb and index fingers close to the appropriate scored line. Then, apply enough pressure to snap the tablet segments apart. Do not use the tablet if it does not break on the scored lines. · Delayed-release tablets: You may also take this medicine by sprinkling the broken tablets onto room-temperature applesauce. Swallow this mixture right away; do not chew. Do not store the mixture for later use. · Oracea® capsules:  This medicine must be taken on an empty stomach, at least 1 hour before or 2 hours after a meal. 
 · Capsule: Swallow whole. Do not break, crush, chew, or open it. · Oral liquid: Shake the bottle well just before each use. Measure the oral liquid medicine with a marked measuring spoon, oral syringe, or medicine cup. · Take all of the medicine in your prescription to clear up your infection, even if you feel better after the first few doses. · Drink plenty of fluids to avoid throat problems, if you take the capsule or tablet form. · Malaria prevention: Start taking the medicine 1 or 2 days before you travel. Take the medicine every day during your trip. Keep taking it for 4 weeks after you return. However, do not use the medicine for longer than 4 months. · Do not use this medicine for more than 9 months if you are using it for rosacea. · Use only the brand of medicine your doctor prescribed. Other brands may not work the same way. · Read and follow the patient instructions that come with this medicine. Talk to your doctor or pharmacist if you have any questions. · Missed dose: Take a dose as soon as you remember. If it is almost time for your next dose, wait until then and take a regular dose. Do not take extra medicine to make up for a missed dose. · Store the medicine in a closed container at room temperature, away from heat, moisture, and direct light. Do not freeze the oral liquid. Drugs and Foods to Avoid: Ask your doctor or pharmacist before using any other medicine, including over-the-counter medicines, vitamins, and herbal products. · Some foods and medicines can affect how doxycycline works. Tell your doctor if you are using any of the following: ¨ Bismuth subsalicylate, isotretinoin or other acne medicines, acitretin or other medicine to treat psoriasis ¨ Penicillin antibiotic, birth control pills, medicine for seizures (such as carbamazepine, phenobarbital, phenytoin), stomach medicine, a blood thinner (such as warfarin), or any medicine that contains aluminum, calcium, or iron (such an antacid or vitamin supplement) Warnings While Using This Medicine: · This medicine may cause birth defects if either partner is using it during conception or pregnancy. Tell your doctor right away if you or your partner becomes pregnant. Birth control pills may not work as well when used with this medicine. Use a second form of birth control to keep from getting pregnant. · Tell your doctor if you have kidney disease, liver disease, asthma, or an allergy to sulfites. Tell your doctor if you had surgery on your stomach, or if you have a history of yeast infections. · This medicine may cause the following problems: ¨ Permanent change in tooth color (in children younger than 6years old) ¨ Increased pressure inside the head ¨ Yeast infection ¨ Immune system problems · This medicine can cause diarrhea. Call your doctor if the diarrhea becomes severe, does not stop, or is bloody. Do not take any medicine to stop diarrhea until you have talked to your doctor. Diarrhea can occur 2 months or more after you stop taking this medicine. · This medicine may make your skin more sensitive to sunlight. Wear sunscreen. Do not use sunlamps or tanning beds. · Tell any doctor or dentist who treats you that you are using this medicine. This medicine may affect certain medical test results. · Call your doctor if your symptoms do not improve or if they get worse. · Keep all medicine out of the reach of children. Never share your medicine with anyone. Possible Side Effects While Using This Medicine:  
Call your doctor right away if you notice any of these side effects: · Allergic reaction: Itching or hives, swelling in your face or hands, swelling or tingling in your mouth or throat, chest tightness, trouble breathing · Blistering, peeling, red skin rash · Burning, pain, or irritation in your upper stomach or throat · Diarrhea that may contain blood · Fever, chills, cough, runny or stuffy nose, sore throat, and body aches · Joint pain, fever, rash, and unusual tiredness or weakness · Severe headache, dizziness, or vision changes If you notice these less serious side effects, talk with your doctor: · Darkening of your skin, scars, teeth, or gums · Sores or white patches on your lips, mouth, or throat If you notice other side effects that you think are caused by this medicine, tell your doctor. Call your doctor for medical advice about side effects. You may report side effects to FDA at 8-611-FDA-7929 © 2017 [facility] Information is for End User's use only and may not be sold, redistributed or otherwise used for commercial purposes. The above information is an  only. It is not intended as medical advice for individual conditions or treatments. Talk to your doctor, nurse or pharmacist before following any medical regimen to see if it is safe and effective for you.

## 2017-10-06 NOTE — H&P
Gynecology History and Physical    Name: Swapnil Vu MRN: [id number] SSN: [id number]    YOB: 1975  Age: 39 y.o. Sex: female       Subjective:      Chief complaint:  Missed     Ida Sanders is a 39 y.o.  female with a history of abnormal uterine bleeding. Previous workup included Ultrasound which revealed missed ab. Previous treatment measures included none. She is admitted for Procedure(s) (LRB):  SUCTION DILATATION AND CURETTAGE COMPLETION (N/A). The current method of family planning is none. OB History      Para Term  AB Living    1         SAB TAB Ectopic Molar Multiple Live Births                 Past Medical History:   Diagnosis Date    Anemia     Asthma     Fall     fall at work and injured back    Hypothyroidism     hypothyriod     Vitamin D deficiency      Past Surgical History:   Procedure Laterality Date    HX HERNIA REPAIR  10/07/2016    open ventral hernia repair by Dr. Zara Goodwin.  HX HERNIA REPAIR  10/07/2016    open ventral hernia repair by Dr Nadia Gross Right 2015    had to rebrake because u=it healed wrong     Social History     Occupational History    Not on file. Social History Main Topics    Smoking status: Never Smoker    Smokeless tobacco: Never Used    Alcohol use No      Comment: Socially.  Drug use: No    Sexual activity: No     Family History   Problem Relation Age of Onset    Heart Disease Maternal Grandmother     Stroke Maternal Grandmother         Allergies   Allergen Reactions    Latex Swelling    Latex Rash    Onion Anaphylaxis    Oxycodone Anaphylaxis and Shortness of Breath    Tomato Anaphylaxis    Egg Rash    Hydrocodone Shortness of Breath and Itching    Mushroom Rash    Shellfish Derived Swelling     Prior to Admission medications    Medication Sig Start Date End Date Taking?  Authorizing Provider   bisacodyl (DULCOLAX, BISACODYL,) 10 mg suppository Insert 10 mg into rectum daily. 10/1/17  Yes Joaquin Buitrago MD        Review of Systems:  A comprehensive review of systems was negative except for that written in the History of Present Illness. Objective:     Vitals:    10/05/17 1435 10/06/17 0642   BP:  116/81   Pulse:  74   Resp:  15   Temp:  98.4 °F (36.9 °C)   SpO2:  100%   Weight: 84.8 kg (187 lb) 85.9 kg (189 lb 6.4 oz)   Height: 5' 9.75\" (1.772 m) 5' 9.75\" (1.772 m)       Physical Exam:  Patient without distress. Assessment:     Active Problems:    * No active hospital problems. *     Missed  with 10 weeks gestation    Plan:     Procedure(s) (LRB):  SUCTION DILATATION AND CURETTAGE COMPLETION (N/A)  Discussed the risks of surgery including the risks of bleeding, infection, deep vein thrombosis, and surgical injuries to internal organs including but not limited to the bowels, bladder, rectum, and female reproductive organs. The patient understands the risks; any and all questions were answered to the patient's satisfaction.     Signed By:  Fernanda Downing MD     2017

## 2017-10-06 NOTE — OP NOTES
[facility]   [address]   OP NOTE       Name:  Jennifer Linder   MR#:  [id number]   :  1975   Account #:  [de-identified]    Surgery Date:  10/06/2017   Date of Adm:  10/06/2017       PREOPERATIVE DIAGNOSES: Missed  at 9 weeks. POSTOPERATIVE DIAGNOSIS: Missed  at 10 weeks. PROCEDURES PERFORMED: Suction dilation and curettage. ESTIMATED BLOOD LOSS:  10 mL. COMPLICATIONS: None. DISPOSITION: Stable. ANESTHESIA: Conscious sedation and paracervical block. SPECIMEN REMOVED: Products of conception that were sent to   genetic analysis. DESCRIPTION OF PROCEDURE: After discussing with the patient   about the risks, benefits and alternatives to procedure, the patient was   taken to the operating room with running IV. The patient placed in the   supine position and repositioned in dorsal lithotomy position. Anesthesia with conscious sedation was administered. Time-out was   carried out at that time. The patient was then prepped and draped in   usual sterile fashion. Sterile red rubber catheter was used to empty the   bladder prior to procedure. The sterile speculum was placed in the   patient's vagina with good visualization of the cervix. Anterior lip of the   cervix was then grasped with a single-tooth tenaculum. Paracervical   block loop with a spinal needle and 10 mL of 1% lidocaine was   injected. The cervix was then dilated with cervical dilators and a size 9   curved suction curette was introduced. Products of conception were   suctioned out. The uterus sounded to 9 cm. The straight curette was   introduced until all 4 sides of the uterine cavity produced a gritty sound. The suction curette was then reintroduced and the remaining products   of conception were suctioned out. All the instruments were removed from the patient's vagina. There was   no complication during this procedure.  The patient was taken to the   recovery room in stable condition. The patient was sent home with   doxycycline and Demerol 50 with followup in 1 week.               Tammie Callahan MD        / Tez Tan   D:  10/06/2017   14:09   T:  10/06/2017   14:26   Job #:  [id number]

## 2017-10-06 NOTE — DISCHARGE INSTRUCTIONS
PATIENT DISCHARGE INSTRUCTIONS      PATIENT DISCHARGE INSTRUCTIONS    Maykel Yeung / [id number] : 1975    Admitted 10/6/2017 Discharged: 10/6/2017       · It is important that you take the medication exactly as they are prescribed. · Keep your medication in the bottles provided by the pharmacist and keep a list of the medication names, dosages, and times to be taken in your wallet. · Do not take other medications without consulting your doctor. What to do at Home    Recommended Diet: Regular Diet    Recommended Activity: Activity as tolerated and no driving for today    If you experience any of the following symptoms fever>101.4, please call [phone]  Make an anayeli with  in 1 week        Signed By: Tanesha Inman MD     2017        You received Toradol during your surgery. You may not take any form of NSAIDS (non steroidal anti inflammatory drugs) such as Advil, Ibuprofen, Aleve, Motrin until 2:00pm.    You received an IV form of Tylenol 1000mg during your surgery, you may take tylenol (or pain medication containing Tylenol or Acetaminophen) in 6 hours at 2:15pm     DO NOT TAKE TYLENOL/ACETAMINOPHEN WITH PERCOCET, [address], [address] TAKE NARCOTIC PAIN MEDICATIONS WITH FOOD   Narcotics tend to be constipating, we suggest taking a stool softener such as Colace or Miralax (follow package instructions). DO NOT DRIVE WHILE TAKING NARCOTIC PAIN MEDICATIONS. DO NOT TAKE SLEEPING MEDICATIONS OR ANTIANXIETY MEDICATIONS WHILE TAKING NARCOTIC PAIN MEDICATIONS,  ESPECIALLY THE NIGHT OF ANESTHESIA. CPAP PATIENTS BE SURE TO WEAR MACHINE WHENEVER NAPPING OR SLEEPING. DISCHARGE SUMMARY from Nurse    The following personal items collected during your admission are returned to you:   Dental Appliance: Dental Appliances: None  Vision: Visual Aid: Glasses (sent to pacu)  Hearing Aid:    Jewelry: Jewelry: Ring (given to )  Clothing: Clothing:  Footwear, Pants, Shirt, Socks, Undergarments, Jacket/Coat (in pt belonging)  Other Valuables: Other Valuables: Eyeglasses (sent to pacu)  Valuables sent to safe:        PATIENT INSTRUCTIONS:    After General Anesthesia or Intravenous Sedation, for 24 hours or while taking prescription Narcotics:        Someone should be with you for the next 24 hours. For your own safety, a responsible adult must drive you home. · Limit your activities  · Recommended activity: Rest today, up with assistance today. Do not climb stairs or shower unattended for the next 24 hours. · Please start with a soft bland diet and advance as tolerated (no nausea) to regular diet. · If you have a sore throat you should try the following: fluids, warm salt water gargles, or throat lozenges. If it does not improve after several days please follow up with your primary physician. · Do not drive and operate hazardous machinery  · Do not make important personal or business decisions  · Do  not drink alcoholic beverages  · If you have not urinated within 8 hours after discharge, please contact your surgeon on call. Report the following to your surgeon:  · Excessive pain, swelling, redness or odor of or around the surgical area  · Temperature over 100.5  · Nausea and vomiting lasting longer than 4 hours or if unable to take medications  · Any signs of decreased circulation or nerve impairment to extremity: change in color, persistent  numbness, tingling, coldness or increase pain      · You will receive a Post Operative Call from one of the Recovery Room Nurses on the day after your surgery to check on you. It is very important for us to know how you are recovering after your surgery. If you have an issue or need to speak with someone, please call your surgeon, do not wait for the post operative call. · You may receive an e-mail or letter in the mail from [facility] regarding your experience with us in the Ambulatory Surgery Unit.  Your feedback is valuable to us and we appreciate your participation in the survey. · If the above instructions are not adequate, please contact Maday Eastman RN, [place] anesthesia Nurse Manager or our Anesthesiologist, at [phone]. If you are having problems after your surgery, call the physician at his office number. · We wish you a speedy recovery ? What to do at Home:      *  Please give a list of your current medications to your Primary Care Provider. *  Please update this list whenever your medications are discontinued, doses are      changed, or new medications (including over-the-counter products) are added. *  Please carry medication information at all times in case of emergency situations. These are general instructions for a healthy lifestyle:    No smoking/ No tobacco products/ Avoid exposure to second hand smoke    Surgeon General's Warning:  Quitting smoking now greatly reduces serious risk to your health. Obesity, smoking, and sedentary lifestyle greatly increases your risk for illness    A healthy diet, regular physical exercise & weight monitoring are important for maintaining a healthy lifestyle    You may be retaining fluid if you have a history of heart failure or if you experience any of the following symptoms:  Weight gain of 3 pounds or more overnight or 5 pounds in a week, increased swelling in our hands or feet or shortness of breath while lying flat in bed. Please call your doctor as soon as you notice any of these symptoms; do not wait until your next office visit. Recognize signs and symptoms of STROKE:    B - Balance  E - Eyes    F-  Face looks uneven    A-  Arms unable to move or move even    S-  Speech slurred or non-existent    T-  Time-call 911 as soon as signs and symptoms begin-DO NOT go       Back to bed or wait to see if you get better-TIME IS BRAIN.       If you have not received your influenza and/or pneumococcal vaccine, please follow up with your primary care physician. The discharge information has been reviewed with the patient and caregiver. The patient and caregiver verbalized understanding.

## 2017-10-06 NOTE — PERIOP NOTES
Pt very woozy and say she sees 2 of everything, slurring her words. When asked about her pain, she said that it's a 10 because she doesn't like any pain. Pt's vital signs are stable. 1412  Fiance at bedside receiving discharge instructions. Pt eating crackers and drinking ginger ale. Pt is more alert and able to speak more clearly.

## 2017-10-06 NOTE — ANESTHESIA POSTPROCEDURE EVALUATION
Post-Anesthesia Evaluation and Assessment    Patient: Donovan Licea MRN: [id number]  SSN: [id number]    YOB: 1975  Age: 39 y.o. Sex: female       Cardiovascular Function/Vital Signs  Visit Vitals    /80    Pulse 71    Temp 36.7 °C (98 °F)    Resp 19    Ht 5' 9.75\" (1.772 m)    Wt 85.9 kg (189 lb 6.4 oz)    SpO2 100%    BMI 27.37 kg/m2       Patient is status post MAC, general - backup anesthesia for Procedure(s):  SUCTION DILATATION AND CURETTAGE COMPLETION. Nausea/Vomiting: None    Postoperative hydration reviewed and adequate. Pain:  Pain Scale 1: FLACC (10/06/17 0831)  Pain Intensity 1: 0 (10/06/17 4357)   Managed    Neurological Status:   Neuro (WDL): Within Defined Limits (10/06/17 0801)   At baseline    Mental Status and Level of Consciousness: Arousable    Pulmonary Status:   O2 Device: Room air (10/06/17 0831)   Adequate oxygenation and airway patent    Complications related to anesthesia: None    Post-anesthesia assessment completed.  No concerns    Signed By: Irasema Conn MD     October 6, 2017

## 2017-10-06 NOTE — PERIOP NOTES
Logan Gauthier  1975  [id number]    Situation:  Verbal report given from: ZORAIDA [address] [place] [address], DARLENE Odom RN  Procedure: Procedure(s):  SUCTION DILATATION AND CURETTAGE COMPLETION    Background:    Preoperative diagnosis: MISSED AB    Postoperative diagnosis: * No post-op diagnosis entered *    :  Dr. Erma Alpers    Assistant(s): Circ-1: Mayela Arboleda RN  Scrub Tech-1: Jaclyn Hill    Specimens:   ID Type Source Tests Collected by Time Destination   1 : Products of conception LUPILLO Glez of Omer Habermann, MD 10/6/2017 7239 Pathology       Assessment:  Intra-procedure medications         Anesthesia gave intra-procedure sedation and medications, see anesthesia flow sheet     Intravenous fluids: LR@ KVO     Vital signs stable       Recommendation:    Permission to share finding with family or friend yes

## 2017-12-28 ENCOUNTER — OFFICE VISIT (OUTPATIENT)
Dept: FAMILY MEDICINE CLINIC | Age: 42
End: 2017-12-28

## 2017-12-28 VITALS
DIASTOLIC BLOOD PRESSURE: 87 MMHG | WEIGHT: 195 LBS | SYSTOLIC BLOOD PRESSURE: 115 MMHG | TEMPERATURE: 98.1 F | BODY MASS INDEX: 27.92 KG/M2 | RESPIRATION RATE: 18 BRPM | OXYGEN SATURATION: 97 % | HEIGHT: 70 IN | HEART RATE: 73 BPM

## 2017-12-28 DIAGNOSIS — G43.009 MIGRAINE WITHOUT AURA AND WITHOUT STATUS MIGRAINOSUS, NOT INTRACTABLE: Primary | ICD-10-CM

## 2017-12-28 DIAGNOSIS — F43.20 ADULT SITUATIONAL STRESS DISORDER: ICD-10-CM

## 2017-12-28 DIAGNOSIS — R25.2 MUSCLE CRAMPS: ICD-10-CM

## 2017-12-28 RX ORDER — AMITRIPTYLINE HYDROCHLORIDE 25 MG/1
25 TABLET, FILM COATED ORAL
Qty: 30 TAB | Refills: 1 | Status: SHIPPED | OUTPATIENT
Start: 2017-12-28 | End: 2018-01-01

## 2017-12-28 RX ORDER — BENZONATATE 100 MG/1
CAPSULE ORAL
COMMUNITY
Start: 2017-12-27 | End: 2018-01-01

## 2017-12-28 NOTE — PATIENT INSTRUCTIONS
I really advise you find a therapist asap--please call some of these and see who can see you quickly. Start the amitriptyline 25mg nightly, hopefully this will help you sleep and stop most of your headaches. Daily exercise is also very important  Try to limit yourself to 1 bottle of wine per week (spread out) or less    [place]  [address] [address], [address] [facility] [address]  [place], [address] [facility]   Phone: [phone]     [facility], [facility] Office  [address] [facility] [address], [address]   [facility]   [address]  Phone [phone]  Fax [phone]  [facility]. Shut Down    Dr. Lieutenant Oppenheim, [address] counselor, substance abuse counselor  [facility] [address] [facility], [address] [place] [address]  Phone: [phone]    Devang Leyva, Ph.D.  [facility] Office  Phone: [phone]  FAX: [phone] [address]  [address]  Phone: [phone]  FAX: [phone]  [address] [facility], [address]  [address]  [phone]  FAX: [phone] [address] [place] [address], [address]    [facility] Office  [phone]  FAX: [address] [facility], [address] [place] [address] [address] [facility]   [address]  [phone]    [facility] [address] [facility], Ph.D  Margarita Johnson [address] [facility], [address] [facility] [address]   [phone]    The [address] [facility] Office  [address] [facility], [address]  [phone]    [facility] Office  [address], [address], [address] [facility] [address], [place], [address]   [phone]    [facility] ([place] near [facility] in [place]  [phone]    [facility]  [address], [place] [address]    [address] [facility] [employer]  [facility] [address] [place] [address], [address] [place] [address] Rhiannon Vides, [address] [place], [place] [address]w Road  [phone]         Adjustment Disorder: Care Instructions  Your Care Instructions    Adjustment disorder means that you have emotional or behavioral problems because of stress. But your response to the stress is far more severe than a normal response. It is severe enough to affect your work or social life and may cause depression and physical pains and problems. Events that may cause this response can include a divorce, money problems, or starting school or a new job. It might be anything that causes some stress. This disorder is most often a short-term problem. It happens within 3 months of the stressful event or change. If the response lasts longer than 6 months after the event ends, you may have a more serious disorder. Follow-up care is a key part of your treatment and safety. Be sure to make and go to all appointments, and call your doctor if you are having problems. It's also a good idea to know your test results and keep a list of the medicines you take. How can you care for yourself at home? · Go to all counseling sessions. Do not skip any because you are feeling better. · If your doctor prescribed medicines, take them exactly as prescribed. Call your doctor if you think you are having a problem with your medicine. You will get more details on the specific medicines your doctor prescribes. · Discuss the causes of your stress with a good friend or family member. Or you can join a support group for people with similar problems. Talking to others sometimes relieves stress. · Get at least 30 minutes of exercise on most days of the week. Walking is a good choice.  You also may want to do other activities, such as running, swimming, cycling, or playing tennis or team sports. Relaxation techniques  Do relaxation exercises 10 to 20 minutes a day. You can play soothing, relaxing music while you do them, if you wish. · Tell others in your house that you are going to do your relaxation exercises. Ask them not to disturb you. · Find a comfortable, quiet place. · Lie down on your back, or sit with your back straight. · Focus on your breathing. Make it slow and steady. · Breathe in through your nose. Breathe out through either your nose or mouth. · Breathe deeply, filling up the area between your navel and your rib cage. Breathe so that your belly goes up and down. · Do not hold your breath. · Breathe like this for 5 to 10 minutes. Notice the feeling of calmness throughout your whole body. As you continue to breathe slowly and deeply, relax by doing these next steps for another 5 to 10 minutes:  · Tighten and relax each muscle group in your body. Start at your toes, and work your way up to your head. · Imagine your muscle groups relaxing and getting heavy. · Empty your mind of all thoughts. · Let yourself relax more and more deeply. · Be aware of the state of calmness that surrounds you. · When your relaxation time is over, you can bring yourself back to alertness by moving your fingers and toes. Then move your hands and feet. And then move your entire body. Sometimes people fall asleep during relaxation. But they most often wake up soon. · Always give yourself time to return to full alertness before you drive a car. Wait to do anything that might cause an accident if you are not fully alert. Never play a relaxation tape while you drive a car. When should you call for help? Call 911 anytime you think you may need emergency care. For example, call if:  ? · You feel you cannot stop from hurting yourself or someone else. Keep the numbers for these national suicide hotlines: 6-570-824-TALK (8-734.719.5220) and 9-968-QCHXVGJ (8-895.886.8826).  If you or someone you know talks about suicide or feeling hopeless, get help right away. ? Watch closely for changes in your health, and be sure to contact your doctor if:  ? · You have new anxiety, or your anxiety gets worse. ? · You have been feeling sad, depressed, or hopeless or have lost interest in things that you usually enjoy. ? · You do not get better as expected. Where can you learn more? Go to http://danika-sarah.info/. Enter [id number] [id number] in the search box to learn more about \"Adjustment Disorder: Care Instructions. \"  Current as of: July 26, 2016  Content Version: 11.4  © 4391-5636 Healthwise, [employer]. Care instructions adapted under license by [employer] (which disclaims liability or warranty for this information). If you have questions about a medical condition or this instruction, always ask your healthcare professional. [address] any warranty or liability for your use of this information.

## 2017-12-28 NOTE — MR AVS SNAPSHOT
Visit Information Date & Time Provider Department Dept. Phone Encounter #  
 12/28/2017  3:30 PM Gauravhal Danish, [address] [phone] [id number] Follow-up Instructions Return in about 2 weeks (around 1/11/2018) for Follow up. Upcoming Health Maintenance Date Due Pneumococcal 19-64 Medium Risk (1 of 1 - PPSV23) 12/7/1994 PAP AKA CERVICAL CYTOLOGY 3/3/2017 DTaP/Tdap/Td series (2 - Td) 1/20/2021 Allergies as of 12/28/2017  Review Complete On: 12/28/2017 By: Balwinder Peng MD  
  
 Severity Noted Reaction Type Reactions Latex  09/20/2010    Swelling Latex  05/20/2012    Rash Onion High 06/11/2015    Anaphylaxis Oxycodone High 06/11/2015    Anaphylaxis, Shortness of Breath Tomato High 06/24/2016    Anaphylaxis Egg  10/03/2016    Rash Hydrocodone  06/11/2015    Shortness of Breath, Itching Mushroom  06/24/2016    Rash Shellfish Derived  06/11/2015    Swelling Current Immunizations  Reviewed on 12/26/2017 Name Date Influenza Nasal Vaccine 3/28/2013 MMR 3/28/2013 Tdap 1/20/2011 Not reviewed this visit You Were Diagnosed With   
  
 Codes Comments Migraine without aura and without status migrainosus, not intractable    -  Primary ICD-10-CM: G43.009 ICD-9-CM: 346.10 Adult situational stress disorder     ICD-10-CM: F45.22 ICD-9-CM: 308.9 Muscle cramps     ICD-10-CM: R25.2 ICD-9-CM: 729.82 Vitals BP Pulse Temp Resp Height(growth percentile) Weight(growth percentile) 115/87 (BP 1 Location: Left arm, BP Patient Position: Sitting) 73 98.1 °F (36.7 °C) (Oral) 18 5' 9.5\" (1.765 m) 195 lb (88.5 kg) LMP SpO2 BMI OB Status Smoking Status 12/01/2017 (Approximate) 97% 28.38 kg/m2 Having regular periods Never Smoker Vitals History BMI and BSA Data Body Mass Index Body Surface Area  
 28.38 kg/m 2 2.08 m 2 Preferred Pharmacy Pharmacy Name Phone [facility] [address], [address] [facility] [address] [phone] Your Updated Medication List  
  
   
This list is accurate as of: 12/28/17  4:28 PM.  Always use your most recent med list.  
  
  
  
  
 amitriptyline 25 mg tablet Commonly known as:  ELAVIL Take 1 Tab by mouth nightly. benzonatate 100 mg capsule Commonly known as:  TESSALON  
  
 bisacodyl 10 mg suppository Commonly known as:  DULCOLAX (BISACODYL) Insert 10 mg into rectum daily. doxycycline 100 mg tablet Commonly known as:  VIBRA-TABS Take 1 Tab by mouth two (2) times a day. meperidine 50 mg tablet Commonly known as:  DEMEROL Take 1 Tab by mouth every four (4) hours as needed. Max Daily Amount: 300 mg. Prescriptions Sent to Pharmacy Refills  
 amitriptyline (ELAVIL) 25 mg tablet 1 Sig: Take 1 Tab by mouth nightly. Class: Normal  
 Pharmacy: [facility] [address], [facility] Ph #: [phone] Route: Oral  
  
We Performed the Following CBC WITH AUTOMATED DIFF [78348 CPT(R)] MAGNESIUM [id number] CPT(R)] METABOLIC PANEL, COMPREHENSIVE [79393 CPT(R)] Follow-up Instructions Return in about 2 weeks (around 1/11/2018) for Follow up. Patient Instructions I really advise you find a therapist asap--please call some of these and see who can see you quickly. Start the amitriptyline 25mg nightly, hopefully this will help you sleep and stop most of your headaches. Daily exercise is also very important Try to limit yourself to 1 bottle of wine per week (spread out) or less Letitia Madrid, NP 
[address] [facility], [address] [place], [address] Phone: [phone] [facility], [facility] [address], [address] [facility] [address] [place]  
[address] [place], [address] Phone [phone] Fax [phone] [facility] Dr. Jazmine Bravo, [facility]ist counselor, substance abuse counselor [address] [place], [address] Phone: [phone] Angelito Echavarria, Ph.D. 
[address] [facility] Phone: [phone] FAX: [phone] [address] [facility] 23 [facility] Phone: [phone] FAX: [phone] 
[address] [facility], [address] [facility] 7 [address] test company [phone] FAX: [phone] [address] [facility] [place], [address] [facility] [phone] FAX: [phone] [facility], [address] [facility], [facility] [address] [facility], [address] [facility] [address] [place], [address] 
[phone] [facility] Brendon Wilkerson, Ph.D 
[address] [facility] [address], [address] [facility] [address]  
[phone] [facility] [address] 1099 [facility], [address] 
[phone] [facility] Office [address] [place] [address], [address] [facility] 
[phone] [facility], [address] [facility] [address], [employer], [address] [phone] [facility] ([place] [facility]) [phone] [address] Location [address] [facility] [address] [place], [address] 47 [facility] [address] [place] [address], [address] 
[phone] Adjustment Disorder: Care Instructions Your Care Instructions Adjustment disorder means that you have emotional or behavioral problems because of stress. But your response to the stress is far more severe than a normal response.  It is severe enough to affect your work or social life and may cause depression and physical pains and problems. Events that may cause this response can include a divorce, money problems, or starting school or a new job. It might be anything that causes some stress. This disorder is most often a short-term problem. It happens within 3 months of the stressful event or change. If the response lasts longer than 6 months after the event ends, you may have a more serious disorder. Follow-up care is a key part of your treatment and safety. Be sure to make and go to all appointments, and call your doctor if you are having problems. It's also a good idea to know your test results and keep a list of the medicines you take. How can you care for yourself at home? · Go to all counseling sessions. Do not skip any because you are feeling better. · If your doctor prescribed medicines, take them exactly as prescribed. Call your doctor if you think you are having a problem with your medicine. You will get more details on the specific medicines your doctor prescribes. · Discuss the causes of your stress with a good friend or family member. Or you can join a support group for people with similar problems. Talking to others sometimes relieves stress. · Get at least 30 minutes of exercise on most days of the week. Walking is a good choice. You also may want to do other activities, such as running, swimming, cycling, or playing tennis or team sports. Relaxation techniques Do relaxation exercises 10 to 20 minutes a day. You can play soothing, relaxing music while you do them, if you wish. · Tell others in your house that you are going to do your relaxation exercises. Ask them not to disturb you. · Find a comfortable, quiet place. · Lie down on your back, or sit with your back straight. · Focus on your breathing. Make it slow and steady. · Breathe in through your nose. Breathe out through either your nose or mouth. · Breathe deeply, filling up the area between your navel and your rib cage. Breathe so that your belly goes up and down. · Do not hold your breath. · Breathe like this for 5 to 10 minutes. Notice the feeling of calmness throughout your whole body. As you continue to breathe slowly and deeply, relax by doing these next steps for another 5 to 10 minutes: · Tighten and relax each muscle group in your body. Start at your toes, and work your way up to your head. · Imagine your muscle groups relaxing and getting heavy. · Empty your mind of all thoughts. · Let yourself relax more and more deeply. · Be aware of the state of calmness that surrounds you. · When your relaxation time is over, you can bring yourself back to alertness by moving your fingers and toes. Then move your hands and feet. And then move your entire body. Sometimes people fall asleep during relaxation. But they most often wake up soon. · Always give yourself time to return to full alertness before you drive a car. Wait to do anything that might cause an accident if you are not fully alert. Never play a relaxation tape while you drive a car. When should you call for help? Call 911 anytime you think you may need emergency care. For example, call if: 
? · You feel you cannot stop from hurting yourself or someone else. Keep the numbers for these national suicide hotlines: 1-669-714-TALK (1-358.541.1417) and 1-859-HJDKHVC (5-526.966.7735). If you or someone you know talks about suicide or feeling hopeless, get help right away. ? Watch closely for changes in your health, and be sure to contact your doctor if: 
? · You have new anxiety, or your anxiety gets worse. ? · You have been feeling sad, depressed, or hopeless or have lost interest in things that you usually enjoy. ? · You do not get better as expected. Where can you learn more? Go to http://danika-sarah.info/. Enter [id number] [id number] in the search box to learn more about \"Adjustment Disorder: Care Instructions. \" Current as of: July 26, 2016 Content Version: 11.4 © 9963-3242 [employer]. Care instructions adapted under license by [employer] (which disclaims liability or warranty for this information). If you have questions about a medical condition or this instruction, always ask your healthcare professional. [place] [address] any warranty or liability for your use of this information. Introducing [facility] & HEALTH SERVICES! Dear Saul Meehan: Thank you for requesting a [employer] account. Our records indicate that you already have an active [employer] account. You can access your account anytime at [email or web address]. [employer] Did you know that you can access your hospital and ER discharge instructions at any time in [employer]? You can also review all of your test results from your hospital stay or ER visit. Additional Information If you have questions, please visit the Frequently Asked Questions section of the [employer] website at [email or web address] Remember, [employer] is NOT to be used for urgent needs. For medical emergencies, dial 911. Now available from your iPhone and Android! Please provide this summary of care documentation to your next provider. Your primary care clinician is listed as Loma Mcardle. If you have any questions after today's visit, please call [phone].

## 2017-12-28 NOTE — PROGRESS NOTES
Chief Complaint   Patient presents with    Stress     work related stress, needs some time off    Sore Throat     c/o very sore throat x 2 days, some cough with yellow green mucus , congestion, ? fever       Reviewed Record in preparation for visit and have obtained necessary documentation. Identified pt with two pt identifiers (Name @ )    Health Maintenance Due   Topic    Pneumococcal 19-64 Medium Risk (1 of 1 - PPSV23)    PAP AKA CERVICAL CYTOLOGY     Influenza Age 5 to Adult         1. Have you been to the ER, urgent care clinic since your last visit? Hospitalized since your last visit? Went to Urgent care yesterday for cough and not feeling well. 2. Have you seen or consulted any other health care providers outside of the [address] [place] [address] since your last visit? Include any pap smears or colon screening.  No
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
## 2017-12-28 NOTE — LETTER
NOTIFICATION RETURN TO WORK / SCHOOL Fax to [employer] [phone] 
 
 
12/28/2017 Ms. Kaela Johnson [place] [address] [phone] To Whom It May Concern: 
 
Kaela Johnson is currently under the care of LISA Joseph. She will return to work/school in 12 weeks. She will follow up with me in 2 weeks. If there are questions or concerns please have the patient contact our office. Sincerely, Radha Nina MD

## 2017-12-29 LAB
ALBUMIN SERPL-MCNC: 4.7 G/DL (ref 3.5–5.5)
ALBUMIN/GLOB SERPL: 1.8 {RATIO} (ref 1.2–2.2)
ALP SERPL-CCNC: 76 IU/L (ref 39–117)
ALT SERPL-CCNC: 12 IU/L (ref 0–32)
AST SERPL-CCNC: 15 IU/L (ref 0–40)
BASOPHILS # BLD AUTO: 0 X10E3/UL (ref 0–0.2)
BASOPHILS NFR BLD AUTO: 1 %
BILIRUB SERPL-MCNC: 0.4 MG/DL (ref 0–1.2)
BUN SERPL-MCNC: 14 MG/DL (ref 6–24)
BUN/CREAT SERPL: 18 (ref 9–23)
CALCIUM SERPL-MCNC: 9.9 MG/DL (ref 8.7–10.2)
CHLORIDE SERPL-SCNC: 101 MMOL/L (ref 96–106)
CO2 SERPL-SCNC: 24 MMOL/L (ref 18–29)
CREAT SERPL-MCNC: 0.78 MG/DL (ref 0.57–1)
EOSINOPHIL # BLD AUTO: 0.3 X10E3/UL (ref 0–0.4)
EOSINOPHIL NFR BLD AUTO: 4 %
ERYTHROCYTE [DISTWIDTH] IN BLOOD BY AUTOMATED COUNT: 14.4 % (ref 12.3–15.4)
GLOBULIN SER CALC-MCNC: 2.6 G/DL (ref 1.5–4.5)
GLUCOSE SERPL-MCNC: 74 MG/DL (ref 65–99)
HCT VFR BLD AUTO: 40.5 % (ref 34–46.6)
HGB BLD-MCNC: 13.1 G/DL (ref 11.1–15.9)
IMM GRANULOCYTES # BLD: 0 X10E3/UL (ref 0–0.1)
IMM GRANULOCYTES NFR BLD: 0 %
LYMPHOCYTES # BLD AUTO: 2.1 X10E3/UL (ref 0.7–3.1)
LYMPHOCYTES NFR BLD AUTO: 31 %
MAGNESIUM SERPL-MCNC: 2.2 MG/DL (ref 1.6–2.3)
MCH RBC QN AUTO: 29.7 PG (ref 26.6–33)
MCHC RBC AUTO-ENTMCNC: 32.3 G/DL (ref 31.5–35.7)
MCV RBC AUTO: 92 FL (ref 79–97)
MONOCYTES # BLD AUTO: 0.7 X10E3/UL (ref 0.1–0.9)
MONOCYTES NFR BLD AUTO: 10 %
NEUTROPHILS # BLD AUTO: 3.6 X10E3/UL (ref 1.4–7)
NEUTROPHILS NFR BLD AUTO: 54 %
PLATELET # BLD AUTO: 230 X10E3/UL (ref 150–379)
POTASSIUM SERPL-SCNC: 4.3 MMOL/L (ref 3.5–5.2)
PROT SERPL-MCNC: 7.3 G/DL (ref 6–8.5)
RBC # BLD AUTO: 4.41 X10E6/UL (ref 3.77–5.28)
SODIUM SERPL-SCNC: 140 MMOL/L (ref 134–144)
WBC # BLD AUTO: 6.6 X10E3/UL (ref 3.4–10.8)

## 2018-01-01 ENCOUNTER — APPOINTMENT (OUTPATIENT)
Dept: GENERAL RADIOLOGY | Age: 43
End: 2018-01-01
Attending: EMERGENCY MEDICINE
Payer: SUBSIDIZED

## 2018-01-01 ENCOUNTER — APPOINTMENT (OUTPATIENT)
Dept: CT IMAGING | Age: 43
End: 2018-01-01
Attending: EMERGENCY MEDICINE
Payer: SUBSIDIZED

## 2018-01-01 ENCOUNTER — HOSPITAL ENCOUNTER (EMERGENCY)
Age: 43
Discharge: HOME | End: 2018-01-01
Attending: EMERGENCY MEDICINE
Payer: SUBSIDIZED

## 2018-01-01 VITALS — DIASTOLIC BLOOD PRESSURE: 81 MMHG | HEART RATE: 71 BPM | RESPIRATION RATE: 16 BRPM | SYSTOLIC BLOOD PRESSURE: 118 MMHG

## 2018-01-01 DIAGNOSIS — F10.920 ALCOHOLIC INTOXICATION WITHOUT COMPLICATION (HCC): ICD-10-CM

## 2018-01-01 DIAGNOSIS — R55 VASOVAGAL EPISODE: ICD-10-CM

## 2018-01-01 DIAGNOSIS — R51.9 NONINTRACTABLE HEADACHE, UNSPECIFIED CHRONICITY PATTERN, UNSPECIFIED HEADACHE TYPE: Primary | ICD-10-CM

## 2018-01-01 LAB
ALBUMIN SERPL-MCNC: 3.7 G/DL (ref 3.5–5)
ALBUMIN/GLOB SERPL: 1.1 {RATIO} (ref 1.1–2.2)
ALP SERPL-CCNC: 77 U/L (ref 45–117)
ALT SERPL-CCNC: 16 U/L (ref 12–78)
AMPHET UR QL SCN: NEGATIVE
ANION GAP SERPL CALC-SCNC: 5 MMOL/L (ref 5–15)
APPEARANCE UR: CLEAR
AST SERPL-CCNC: 15 U/L (ref 15–37)
BACTERIA URNS QL MICRO: ABNORMAL /HPF
BARBITURATES UR QL SCN: NEGATIVE
BASOPHILS # BLD: 0 K/UL (ref 0–0.1)
BASOPHILS NFR BLD: 0 % (ref 0–1)
BENZODIAZ UR QL: NEGATIVE
BILIRUB SERPL-MCNC: 0.5 MG/DL (ref 0.2–1)
BILIRUB UR QL: NEGATIVE
BUN SERPL-MCNC: 15 MG/DL (ref 6–20)
BUN/CREAT SERPL: 19 (ref 12–20)
CALCIUM SERPL-MCNC: 8.7 MG/DL (ref 8.5–10.1)
CANNABINOIDS UR QL SCN: NEGATIVE
CHLORIDE SERPL-SCNC: 108 MMOL/L (ref 97–108)
CO2 SERPL-SCNC: 26 MMOL/L (ref 21–32)
COCAINE UR QL SCN: NEGATIVE
COLOR UR: ABNORMAL
CREAT SERPL-MCNC: 0.81 MG/DL (ref 0.55–1.02)
D DIMER PPP FEU-MCNC: 0.32 MG/L FEU (ref 0–0.65)
DRUG SCRN COMMENT,DRGCM: NORMAL
EOSINOPHIL # BLD: 0.1 K/UL (ref 0–0.4)
EOSINOPHIL NFR BLD: 1 % (ref 0–7)
EPITH CASTS URNS QL MICRO: ABNORMAL /LPF
ERYTHROCYTE [DISTWIDTH] IN BLOOD BY AUTOMATED COUNT: 13.5 % (ref 11.5–14.5)
ETHANOL SERPL-MCNC: <10 MG/DL
GLOBULIN SER CALC-MCNC: 3.4 G/DL (ref 2–4)
GLUCOSE SERPL-MCNC: 86 MG/DL (ref 65–100)
GLUCOSE UR STRIP.AUTO-MCNC: NEGATIVE MG/DL
HCG UR QL: NEGATIVE
HCG UR QL: NEGATIVE
HCT VFR BLD AUTO: 35 % (ref 35–47)
HGB BLD-MCNC: 11.7 G/DL (ref 11.5–16)
HGB UR QL STRIP: NEGATIVE
KETONES UR QL STRIP.AUTO: NEGATIVE MG/DL
LEUKOCYTE ESTERASE UR QL STRIP.AUTO: NEGATIVE
LIPASE SERPL-CCNC: 116 U/L (ref 73–393)
LYMPHOCYTES # BLD: 2 K/UL (ref 0.8–3.5)
LYMPHOCYTES NFR BLD: 39 % (ref 12–49)
MAGNESIUM SERPL-MCNC: 2.1 MG/DL (ref 1.6–2.4)
MCH RBC QN AUTO: 29.3 PG (ref 26–34)
MCHC RBC AUTO-ENTMCNC: 33.4 G/DL (ref 30–36.5)
MCV RBC AUTO: 87.5 FL (ref 80–99)
METHADONE UR QL: NEGATIVE
MONOCYTES # BLD: 0.5 K/UL (ref 0–1)
MONOCYTES NFR BLD: 9 % (ref 5–13)
MUCOUS THREADS URNS QL MICRO: ABNORMAL /LPF
NEUTS SEG # BLD: 2.6 K/UL (ref 1.8–8)
NEUTS SEG NFR BLD: 51 % (ref 32–75)
NITRITE UR QL STRIP.AUTO: NEGATIVE
OPIATES UR QL: NEGATIVE
PCP UR QL: NEGATIVE
PH UR STRIP: 6.5 [PH] (ref 5–8)
PLATELET # BLD AUTO: 206 K/UL (ref 150–400)
POTASSIUM SERPL-SCNC: 3.9 MMOL/L (ref 3.5–5.1)
PROT SERPL-MCNC: 7.1 G/DL (ref 6.4–8.2)
PROT UR STRIP-MCNC: NEGATIVE MG/DL
RBC # BLD AUTO: 4 M/UL (ref 3.8–5.2)
RBC #/AREA URNS HPF: ABNORMAL /HPF (ref 0–5)
SODIUM SERPL-SCNC: 139 MMOL/L (ref 136–145)
SP GR UR REFRACTOMETRY: 1.02 (ref 1–1.03)
TROPONIN I SERPL-MCNC: <0.04 NG/ML
UA: UC IF INDICATED,UAUC: ABNORMAL
UROBILINOGEN UR QL STRIP.AUTO: 1 EU/DL (ref 0.2–1)
WBC # BLD AUTO: 5.2 K/UL (ref 3.6–11)
WBC URNS QL MICRO: ABNORMAL /HPF (ref 0–4)

## 2018-01-01 PROCEDURE — 85025 COMPLETE CBC W/AUTO DIFF WBC: CPT | Performed by: EMERGENCY MEDICINE

## 2018-01-01 PROCEDURE — 80307 DRUG TEST PRSMV CHEM ANLYZR: CPT | Performed by: EMERGENCY MEDICINE

## 2018-01-01 PROCEDURE — 81025 URINE PREGNANCY TEST: CPT

## 2018-01-01 PROCEDURE — 80053 COMPREHEN METABOLIC PANEL: CPT | Performed by: EMERGENCY MEDICINE

## 2018-01-01 PROCEDURE — 73030 X-RAY EXAM OF SHOULDER: CPT

## 2018-01-01 PROCEDURE — 83690 ASSAY OF LIPASE: CPT | Performed by: EMERGENCY MEDICINE

## 2018-01-01 PROCEDURE — 96361 HYDRATE IV INFUSION ADD-ON: CPT

## 2018-01-01 PROCEDURE — 70450 CT HEAD/BRAIN W/O DYE: CPT

## 2018-01-01 PROCEDURE — 72125 CT NECK SPINE W/O DYE: CPT

## 2018-01-01 PROCEDURE — 83735 ASSAY OF MAGNESIUM: CPT | Performed by: EMERGENCY MEDICINE

## 2018-01-01 PROCEDURE — 99284 EMERGENCY DEPT VISIT MOD MDM: CPT

## 2018-01-01 PROCEDURE — 74011250636 HC RX REV CODE- 250/636: Performed by: EMERGENCY MEDICINE

## 2018-01-01 PROCEDURE — 84484 ASSAY OF TROPONIN QUANT: CPT | Performed by: EMERGENCY MEDICINE

## 2018-01-01 PROCEDURE — 36415 COLL VENOUS BLD VENIPUNCTURE: CPT | Performed by: EMERGENCY MEDICINE

## 2018-01-01 PROCEDURE — 93005 ELECTROCARDIOGRAM TRACING: CPT

## 2018-01-01 PROCEDURE — 87086 URINE CULTURE/COLONY COUNT: CPT | Performed by: EMERGENCY MEDICINE

## 2018-01-01 PROCEDURE — 81001 URINALYSIS AUTO W/SCOPE: CPT | Performed by: EMERGENCY MEDICINE

## 2018-01-01 PROCEDURE — 85379 FIBRIN DEGRADATION QUANT: CPT | Performed by: EMERGENCY MEDICINE

## 2018-01-01 PROCEDURE — 96374 THER/PROPH/DIAG INJ IV PUSH: CPT

## 2018-01-01 RX ORDER — PROMETHAZINE HYDROCHLORIDE 25 MG/1
25 TABLET ORAL
Qty: 12 TAB | Refills: 0 | Status: SHIPPED | OUTPATIENT
Start: 2018-01-01 | End: 2019-09-24 | Stop reason: SDUPTHER

## 2018-01-01 RX ORDER — SODIUM CHLORIDE 0.9 % (FLUSH) 0.9 %
5-10 SYRINGE (ML) INJECTION EVERY 8 HOURS
Status: DISCONTINUED | OUTPATIENT
Start: 2018-01-01 | End: 2018-01-01 | Stop reason: HOSPADM

## 2018-01-01 RX ORDER — KETOROLAC TROMETHAMINE 30 MG/ML
15 INJECTION, SOLUTION INTRAMUSCULAR; INTRAVENOUS
Status: COMPLETED | OUTPATIENT
Start: 2018-01-01 | End: 2018-01-01

## 2018-01-01 RX ORDER — BUTALBITAL, ACETAMINOPHEN AND CAFFEINE 50; 325; 40 MG/1; MG/1; MG/1
1 TABLET ORAL
Qty: 12 TAB | Refills: 0 | Status: SHIPPED | OUTPATIENT
Start: 2018-01-01 | End: 2019-09-24 | Stop reason: SDUPTHER

## 2018-01-01 RX ORDER — SODIUM CHLORIDE 0.9 % (FLUSH) 0.9 %
5-10 SYRINGE (ML) INJECTION AS NEEDED
Status: DISCONTINUED | OUTPATIENT
Start: 2018-01-01 | End: 2018-01-01 | Stop reason: HOSPADM

## 2018-01-01 RX ADMIN — SODIUM CHLORIDE 1000 ML: 900 INJECTION, SOLUTION INTRAVENOUS at 14:34

## 2018-01-01 RX ADMIN — KETOROLAC TROMETHAMINE 15 MG: 30 INJECTION, SOLUTION INTRAMUSCULAR at 15:14

## 2018-01-01 NOTE — ED NOTES
Discharge instructions given to patient by Rj Veronica MD. Patient verbalized understanding of discharge instructions. Pt discharged without difficulty. Pt discharged in stable condition via ambulation, accompanied by .

## 2018-01-01 NOTE — DISCHARGE INSTRUCTIONS
Fainting: Care Instructions  Your Care Instructions    When you faint, or pass out, you lose consciousness for a short time. A brief drop in blood flow to the brain often causes it. When you fall or lie down, more blood flows to your brain and you regain consciousness. Emotional stress, pain, or overheating-especially if you have been standing-can make you faint. In these cases, fainting is usually not serious. But fainting can be a sign of a more serious problem. Your doctor may want you to have more tests to rule out other causes. The treatment you need depends on the reason why you fainted. The doctor has checked you carefully, but problems can develop later. If you notice any problems or new symptoms, get medical treatment right away. Follow-up care is a key part of your treatment and safety. Be sure to make and go to all appointments, and call your doctor if you are having problems. It's also a good idea to know your test results and keep a list of the medicines you take. How can you care for yourself at home? · Drink plenty of fluids to prevent dehydration. If you have kidney, heart, or liver disease and have to limit fluids, talk with your doctor before you increase your fluid intake. When should you call for help? Call 911 anytime you think you may need emergency care. For example, call if:  ? · You have symptoms of a heart problem. These may include:  ¨ Chest pain or pressure. ¨ Severe trouble breathing. ¨ A fast or irregular heartbeat. ¨ Lightheadedness or sudden weakness. ¨ Coughing up pink, foamy mucus. ¨ Passing out. After you call 911, the  may tell you to chew 1 adult-strength or 2 to 4 low-dose aspirin. Wait for an ambulance. Do not try to drive yourself. ? · You have symptoms of a stroke. These may include:  ¨ Sudden numbness, tingling, weakness, or loss of movement in your face, arm, or leg, especially on only one side of your body. ¨ Sudden vision changes.   ¨ Sudden trouble speaking. ¨ Sudden confusion or trouble understanding simple statements. ¨ Sudden problems with walking or balance. ¨ A sudden, severe headache that is different from past headaches. ? · You passed out (lost consciousness) again. ? Watch closely for changes in your health, and be sure to contact your doctor if:  ? · You do not get better as expected. Where can you learn more? Go to http://danika-sarah.info/. Enter J821 in the search box to learn more about \"Fainting: Care Instructions. \"  Current as of: March 20, 2017  Content Version: 11.4  © 3539-9122 [employer]. Care instructions adapted under license by [employer] (which disclaims liability or warranty for this information). If you have questions about a medical condition or this instruction, always ask your healthcare professional. [address] any warranty or liability for your use of this information. Headache: Care Instructions  Your Care Instructions    Headaches have many possible causes. Most headaches aren't a sign of a more serious problem, and they will get better on their own. Home treatment may help you feel better faster. The doctor has checked you carefully, but problems can develop later. If you notice any problems or new symptoms, get medical treatment right away. Follow-up care is a key part of your treatment and safety. Be sure to make and go to all appointments, and call your doctor if you are having problems. It's also a good idea to know your test results and keep a list of the medicines you take. How can you care for yourself at home? · Do not drive if you have taken a prescription pain medicine. · Rest in a quiet, dark room until your headache is gone. Close your eyes and try to relax or go to sleep. Don't watch TV or read. · Put a cold, moist cloth or cold pack on the painful area for 10 to 20 minutes at a time.  Put a thin cloth between the cold pack and your skin. · Use a warm, moist towel or a heating pad set on low to relax tight shoulder and neck muscles. · Have someone gently massage your neck and shoulders. · Take pain medicines exactly as directed. ¨ If the doctor gave you a prescription medicine for pain, take it as prescribed. ¨ If you are not taking a prescription pain medicine, ask your doctor if you can take an over-the-counter medicine. · Be careful not to take pain medicine more often than the instructions allow, because you may get worse or more frequent headaches when the medicine wears off. · Do not ignore new symptoms that occur with a headache, such as a fever, weakness or numbness, vision changes, or confusion. These may be signs of a more serious problem. To prevent headaches  · Keep a headache diary so you can figure out what triggers your headaches. Avoiding triggers may help you prevent headaches. Record when each headache began, how long it lasted, and what the pain was like (throbbing, aching, stabbing, or dull). Write down any other symptoms you had with the headache, such as nausea, flashing lights or dark spots, or sensitivity to bright light or loud noise. Note if the headache occurred near your period. List anything that might have triggered the headache, such as certain foods (chocolate, cheese, wine) or odors, smoke, bright light, stress, or lack of sleep. · Find healthy ways to deal with stress. Headaches are most common during or right after stressful times. Take time to relax before and after you do something that has caused a headache in the past.  · Try to keep your muscles relaxed by keeping good posture. Check your jaw, face, neck, and shoulder muscles for tension, and try relaxing them. When sitting at a desk, change positions often, and stretch for 30 seconds each hour. · Get plenty of sleep and exercise. · Eat regularly and well. Long periods without food can trigger a headache.   · Treat yourself to a massage. Some people find that regular massages are very helpful in relieving tension. · Limit caffeine by not drinking too much coffee, tea, or soda. But don't quit caffeine suddenly, because that can also give you headaches. · Reduce eyestrain from computers by blinking frequently and looking away from the computer screen every so often. Make sure you have proper eyewear and that your monitor is set up properly, about an arm's length away. · Seek help if you have depression or anxiety. Your headaches may be linked to these conditions. Treatment can both prevent headaches and help with symptoms of anxiety or depression. When should you call for help? Call 911 anytime you think you may need emergency care. For example, call if:  ? · You have signs of a stroke. These may include:  ¨ Sudden numbness, paralysis, or weakness in your face, arm, or leg, especially on only one side of your body. ¨ Sudden vision changes. ¨ Sudden trouble speaking. ¨ Sudden confusion or trouble understanding simple statements. ¨ Sudden problems with walking or balance. ¨ A sudden, severe headache that is different from past headaches. ?Call your doctor now or seek immediate medical care if:  ? · You have a new or worse headache. ? · Your headache gets much worse. Where can you learn more? Go to http://danika-sarah.info/. Enter M271 in the search box to learn more about \"Headache: Care Instructions. \"  Current as of: October 14, 2016  Content Version: 11.4  © 5872-9992 [email or web address]. Care instructions adapted under license by [employer] (which disclaims liability or warranty for this information). If you have questions about a medical condition or this instruction, always ask your healthcare professional. [place] [address] any warranty or liability for your use of this information. [employer] Activation    Thank you for requesting access to [employer].  Please follow the instructions below to securely access and download your online medical record. [facility] allows you to send messages to your doctor, view your test results, renew your prescriptions, schedule appointments, and more. How Do I Sign Up? 1. In your internet browser, go to [email or web address]  2. Click on the First Time User? Click Here link in the Sign In box. You will be redirect to the New Member Sign Up page. 3. Enter your [facility] Access Code exactly as it appears below. You will not need to use this code after youve completed the sign-up process. If you do not sign up before the expiration date, you must request a new code. [facility] Access Code: Activation code not generated  Current [facility] Status: Active (This is the date your [facility] access code will )    4. Enter the last four digits of your Social Security Number (xxxx) and Date of Birth (mm/dd/yyyy) as indicated and click Submit. You will be taken to the next sign-up page. 5. Create a [facility] ID. This will be your [facility] login ID and cannot be changed, so think of one that is secure and easy to remember. 6. Create a [facility] password. You can change your password at any time. 7. Enter your Password Reset Question and Answer. This can be used at a later time if you forget your password. 8. Enter your e-mail address. You will receive e-mail notification when new information is available in [address] 9. Click Sign Up. You can now view and download portions of your medical record. 10. Click the Download Summary menu link to download a portable copy of your medical information. Additional Information    If you have questions, please visit the Frequently Asked Questions section of the [facility] website at [email or web address] Remember, [facility] is NOT to be used for urgent needs. For medical emergencies, dial 911.

## 2018-01-01 NOTE — ED PROVIDER NOTES
EMERGENCY DEPARTMENT HISTORY AND PHYSICAL EXAM      Date: 1/1/2018  Patient Name: Eugenio Driscoll    History of Presenting Illness     Chief Complaint   Patient presents with    Syncope       History Provided By: Patient and Segundo Camacho    HPI: Eugenio Driscoll, 43 y.o. female with PMHx significant for Seizure / Asthma / Hypothyroidism / Anemia, presents via EMS to the ED for evaluation of syncopal episode x this morning. Per agustin, pt woke up this morning at 0700 c/o headache, tingling in her left hand, and a \"funny feeling\" in her left shoulder/neck and BL feet, describing it as a spasm/burning sensation. Pt does not remember passing out. Per , pt came out of the bathroom, placed her hands on her head then passed out and fell forward, landing on her left shoulder. She also endorses an episode of vomiting this morning. Pt states that she drank more etOH than per usual yesterday evening and then fell asleep on her couch, laying flat on her back. She denies any recent drug use. Pt denies any abdominal pain, chest pain, fever or back pain. PCP: Monica Vu MD     Social Hx: + etOH, - tobacco, - elicit drugs    There are no other complaints, changes, or physical findings at this time. Past History     Past Medical History:  Past Medical History:   Diagnosis Date    Anemia     Asthma     Fall 2006    fall at work and injured back    Hypothyroidism     hypothyriod     Vitamin D deficiency        Past Surgical History:  Past Surgical History:   Procedure Laterality Date    HX HERNIA REPAIR  10/07/2016    open ventral hernia repair by Dr. Tyrone Galan.     HX HERNIA REPAIR  10/07/2016    open ventral hernia repair by Dr Monita Sicard Right 1/2015    had to rebrake because u=it healed wrong       Family History:  Family History   Problem Relation Age of Onset    Heart Disease Maternal Grandmother     Stroke Maternal Grandmother        Social History:  Social History   Substance Use Topics    Smoking status: Never Smoker    Smokeless tobacco: Never Used    Alcohol use 0.0 oz/week      Comment: rare       Allergies: Allergies   Allergen Reactions    Latex Swelling    Latex Rash    Onion Anaphylaxis    Oxycodone Anaphylaxis and Shortness of Breath    Tomato Anaphylaxis    Egg Rash    Hydrocodone Shortness of Breath and Itching    Mushroom Rash    Shellfish Derived Swelling         Review of Systems   Review of Systems   Constitutional: Negative. Negative for chills and fever. HENT: Negative. Negative for congestion and rhinorrhea. Respiratory: Negative. Negative for cough, chest tightness and wheezing. Cardiovascular: Negative. Negative for chest pain and palpitations. Gastrointestinal: Positive for vomiting. Negative for abdominal pain and constipation. Endocrine: Negative. Genitourinary: Negative. Negative for decreased urine volume, flank pain, hematuria and pelvic pain. Musculoskeletal: Positive for neck pain. Negative for back pain. Positive for shoulder pain and foot pain   Skin: Negative. Negative for color change, pallor and rash. Neurological: Positive for syncope and headaches. Negative for dizziness and seizures. Positive for tingling in hands   Hematological: Negative. Negative for adenopathy. Psychiatric/Behavioral: Negative. All other systems reviewed and are negative. Physical Exam   Physical Exam   Constitutional: She is oriented to person, place, and time. She appears well-developed and well-nourished. No distress. HENT:   Head: Normocephalic and atraumatic. Mouth/Throat: No oropharyngeal exudate. Eyes: Conjunctivae are normal. Pupils are equal, round, and reactive to light. Right eye exhibits no discharge. Left eye exhibits no discharge. No scleral icterus. Neck: Trachea normal and phonation normal. Neck supple. No JVD present. Muscular tenderness present. No spinous process tenderness present.  Carotid bruit is not present. Decreased range of motion present. No Brudzinski's sign and no Kernig's sign noted. Cardiovascular: Normal rate, regular rhythm, normal heart sounds, intact distal pulses and normal pulses. PMI is not displaced. Exam reveals no gallop and no friction rub. No murmur heard. Pulmonary/Chest: Effort normal and breath sounds normal. No stridor. No respiratory distress. She has no wheezes. She has no rales. She exhibits no tenderness. Abdominal: Soft. Bowel sounds are normal. She exhibits no distension and no mass. There is no tenderness. There is no rebound and no guarding. Musculoskeletal:        Left shoulder: She exhibits decreased range of motion, tenderness and pain. She exhibits no swelling, no effusion, no crepitus, no deformity, no spasm, normal pulse and normal strength. Left elbow: Normal.        Right hip: Normal.        Left hip: Normal.   Neurological: She is alert and oriented to person, place, and time. She has normal strength. She is not disoriented. She displays normal reflexes. No cranial nerve deficit or sensory deficit. She exhibits normal muscle tone. She displays no seizure activity. Coordination and gait normal. GCS eye subscore is 4. GCS verbal subscore is 5. GCS motor subscore is 6. Reflex Scores:       Patellar reflexes are 2+ on the right side and 2+ on the left side. Not postictal, no tongue lac, no urinary incontinence   Skin: Skin is warm. No rash noted. She is not diaphoretic. No pallor. Vitals reviewed.         Diagnostic Study Results     Labs -     Recent Results (from the past 12 hour(s))   EKG, 12 LEAD, INITIAL    Collection Time: 01/01/18  1:47 PM   Result Value Ref Range    Ventricular Rate 69 BPM    Atrial Rate 69 BPM    P-R Interval 152 ms    QRS Duration 70 ms    Q-T Interval 370 ms    QTC Calculation (Bezet) 396 ms    Calculated P Axis 20 degrees    Calculated R Axis 13 degrees    Calculated T Axis 13 degrees    Diagnosis       Normal sinus rhythm  Low voltage QRS  When compared with ECG of 06-AUG-2017 22:29,  No significant change was found     HCG URINE, QL. - POC    Collection Time: 01/01/18  2:24 PM   Result Value Ref Range    Pregnancy test,urine (POC) NEGATIVE  NEG     CBC WITH AUTOMATED DIFF    Collection Time: 01/01/18  2:25 PM   Result Value Ref Range    WBC 5.2 3.6 - 11.0 K/uL    RBC 4.00 3.80 - 5.20 M/uL    HGB 11.7 11.5 - 16.0 g/dL    HCT 35.0 35.0 - 47.0 %    MCV 87.5 80.0 - 99.0 FL    MCH 29.3 26.0 - 34.0 PG    MCHC 33.4 30.0 - 36.5 g/dL    RDW 13.5 11.5 - 14.5 %    PLATELET 255 720 - 622 K/uL    NEUTROPHILS 51 32 - 75 %    LYMPHOCYTES 39 12 - 49 %    MONOCYTES 9 5 - 13 %    EOSINOPHILS 1 0 - 7 %    BASOPHILS 0 0 - 1 %    ABS. NEUTROPHILS 2.6 1.8 - 8.0 K/UL    ABS. LYMPHOCYTES 2.0 0.8 - 3.5 K/UL    ABS. MONOCYTES 0.5 0.0 - 1.0 K/UL    ABS. EOSINOPHILS 0.1 0.0 - 0.4 K/UL    ABS. BASOPHILS 0.0 0.0 - 0.1 K/UL   METABOLIC PANEL, COMPREHENSIVE    Collection Time: 01/01/18  2:25 PM   Result Value Ref Range    Sodium 139 136 - 145 mmol/L    Potassium 3.9 3.5 - 5.1 mmol/L    Chloride 108 97 - 108 mmol/L    CO2 26 21 - 32 mmol/L    Anion gap 5 5 - 15 mmol/L    Glucose 86 65 - 100 mg/dL    BUN 15 6 - 20 MG/DL    Creatinine 0.81 0.55 - 1.02 MG/DL    BUN/Creatinine ratio 19 12 - 20      GFR est AA >60 >60 ml/min/1.73m2    GFR est non-AA >60 >60 ml/min/1.73m2    Calcium 8.7 8.5 - 10.1 MG/DL    Bilirubin, total 0.5 0.2 - 1.0 MG/DL    ALT (SGPT) 16 12 - 78 U/L    AST (SGOT) 15 15 - 37 U/L    Alk.  phosphatase 77 45 - 117 U/L    Protein, total 7.1 6.4 - 8.2 g/dL    Albumin 3.7 3.5 - 5.0 g/dL    Globulin 3.4 2.0 - 4.0 g/dL    A-G Ratio 1.1 1.1 - 2.2     LIPASE    Collection Time: 01/01/18  2:25 PM   Result Value Ref Range    Lipase 116 73 - 393 U/L   URINALYSIS W/ REFLEX CULTURE    Collection Time: 01/01/18  2:25 PM   Result Value Ref Range    Color YELLOW/STRAW      Appearance CLEAR CLEAR      Specific gravity 1.021 1.003 - 1.030      pH (UA) 6.5 5.0 - 8.0      Protein NEGATIVE  NEG mg/dL    Glucose NEGATIVE  NEG mg/dL    Ketone NEGATIVE  NEG mg/dL    Bilirubin NEGATIVE  NEG      Blood NEGATIVE  NEG      Urobilinogen 1.0 0.2 - 1.0 EU/dL    Nitrites NEGATIVE  NEG      Leukocyte Esterase NEGATIVE  NEG      WBC 0-4 0 - 4 /hpf    RBC 0-5 0 - 5 /hpf    Epithelial cells FEW FEW /lpf    Bacteria 1+ (A) NEG /hpf    UA:UC IF INDICATED URINE CULTURE ORDERED (A) CNI      Mucus TRACE (A) NEG /lpf   D DIMER    Collection Time: 01/01/18  2:25 PM   Result Value Ref Range    D-dimer 0.32 0.00 - 0.65 mg/L FEU   TROPONIN I    Collection Time: 01/01/18  2:25 PM   Result Value Ref Range    Troponin-I, Qt. <0.04 <0.05 ng/mL   DRUG SCREEN, URINE    Collection Time: 01/01/18  2:25 PM   Result Value Ref Range    AMPHETAMINES NEGATIVE  NEG      BARBITURATES NEGATIVE  NEG      BENZODIAZEPINES NEGATIVE  NEG      COCAINE NEGATIVE  NEG      METHADONE NEGATIVE  NEG      OPIATES NEGATIVE  NEG      PCP(PHENCYCLIDINE) NEGATIVE  NEG      THC (TH-CANNABINOL) NEGATIVE  NEG      Drug screen comment (NOTE)    ETHYL ALCOHOL    Collection Time: 01/01/18  2:25 PM   Result Value Ref Range    ALCOHOL(ETHYL),SERUM <10 <10 MG/DL   MAGNESIUM    Collection Time: 01/01/18  2:25 PM   Result Value Ref Range    Magnesium 2.1 1.6 - 2.4 mg/dL   HCG URINE, QL. - POC    Collection Time: 01/01/18  2:37 PM   Result Value Ref Range    Pregnancy test,urine (POC) NEGATIVE  NEG         Radiologic Studies -   XR SHOULDER LT AP/LAT MIN 2 V   Final Result   EXAM:  XR SHOULDER LT AP/LAT MIN 2 V     INDICATION: Left shoulder pain after fall     COMPARISON: 4/12/2017.     FINDINGS: Three nonstandard views of the left shoulder demonstrate no fracture,  dislocation or other acute abnormality.     IMPRESSION  IMPRESSION:  No acute abnormality.    CT SPINE CERV WO CONT   Final Result      CT HEAD WO CONT   Final Result        CT Results  (Last 48 hours)               01/01/18 1455  CT HEAD WO CONT Final result    Impression: IMPRESSION: Normal study               Narrative:  EXAM:  CT HEAD WO CONT       INDICATION: Headache after syncopal episode today. COMPARISON: None. CONTRAST:  None. TECHNIQUE: Unenhanced CT of the head was performed using 5 mm images. Brain and   bone windows were generated. CT dose reduction was achieved through use of a   standardized protocol tailored for this examination and automatic exposure   control for dose modulation. FINDINGS:   The ventricles and sulci are normal in size, shape and configuration and   midline. There is no significant white matter disease. There is no intracranial   hemorrhage, extra-axial collection, mass, mass effect or midline shift. The   basilar cisterns are open. No acute infarct is identified. The bone windows   demonstrate no abnormalities. The visualized portions of the paranasal sinuses   and mastoid air cells are clear. 01/01/18 1455  CT SPINE CERV WO CONT Final result    Impression:  IMPRESSION:   Normal study               Narrative:  EXAM:  CT CERVICAL SPINE WITHOUT CONTRAST       INDICATION: Neck pain after syncopal episode today       COMPARISON: 4/12/2017       CONTRAST:  None. TECHNIQUE: Multislice helical CT of the cervical spine was performed without   intravenous contrast administration. Sagittal and coronal reconstructions were   generated. CT dose reduction was achieved through use of a standardized   protocol tailored for this examination and automatic exposure control for dose   modulation. FINDINGS:       The alignment is within normal limits. There is no fracture or subluxation. The   odontoid process is intact. The craniocervical junction is within normal limits. The prevertebral soft tissues are within normal limits. C2-C3:  There is no spinal canal or neural foraminal stenosis. C3-C4:  There is no spinal canal or neural foraminal stenosis.        C4-C5:  There is no spinal canal or neural foraminal stenosis. C5-C6:  There is no spinal canal or neural foraminal stenosis. C6-C7:  There is no spinal canal or neural foraminal stenosis. C7-T1:  There is no spinal canal or neural foraminal stenosis. Medical Decision Making   I am the first provider for this patient. I reviewed the vital signs, available nursing notes, past medical history, past surgical history, family history and social history. Vital Signs-Reviewed the patient's vital signs. Patient Vitals for the past 12 hrs:   Pulse Resp BP   01/01/18 1345 71 16 118/81   01/01/18 1342 88 (!) 6 (!) 130/94     Cardiac Monitor:   Rate: 88 bpm  Rhythm: Normal Sinus Rhythm      EKG interpretation: (Preliminary) 1351  Rhythm: normal sinus rhythm; and regular . Rate (approx.): 69; Axis: normal; OR interval: normal; QRS interval: normal ; ST/T wave: normal; Other findings: normal.  Written by Kathryn Keller. Angie Li ED Scribe, as dictated by John Vaca MD.    Records Reviewed: Nursing Notes, Old Medical Records, Previous electrocardiograms, Ambulance Run Sheet and Previous Laboratory Studies    Provider Notes (Medical Decision Making):   DDx: Alcohol intoxication, Seizure, ICH/Aneurysm, Migraine, Cervical radiculopathy, Shoulder sprain, Electrolyte abnormality, Vasovagal syncope, Arrhythmia, PE, Pregnancy. Impression/Plan: Healthy pt who admits too much alcohol last PM and according to marvin awakened complaining of pain and tingling in her arm. She did sleep on the couch. Question if compressive issues causing her pain. She was walking and complained of head pain and apparently had a brief syncopal event. Suspect etOH intoxication with musculoskeletal pain followed by vasovagal syncope. Will obtain head CT, labs to assist with final disposition. ED Course:   Initial assessment performed.  The patients presenting problems have been discussed, and they are in agreement with the care plan formulated and outlined with them. I have encouraged them to ask questions as they arise throughout their visit. PROGRESS NOTE:  4:31 PM  Pt reevaluated. Pt's pain is much better. She ambulated to the bathroom without dizziness. Ready for discharge. Written by Brenda Crow. FLOYD Etienneibe, as dictated by Alin Alberto MD    Critical Care Time:   0 minutes    Disposition:  DISCHARGE NOTE:  4:52 PM  The patient's results have been reviewed with family and/or caregiver. They verbally convey their understanding and agreement of the patient's signs, symptoms, diagnosis, treatment, and prognosis. They additionally agree to follow up as recommended in the discharge instructions or to return to the Emergency Room should the patient's condition change prior to their follow-up appointment. The family and/or caregiver verbally agrees with the care-plan and all of their questions have been answered. The discharge instructions have also been provided to the them along with educational information regarding the patient's diagnosis and a list of reasons why the patient would want to return to the ER prior to their follow-up appointment should their condition change. Written by Brenda Crow. FLOYD Etienneibe, as dictated by Alin Alberto MD.    PLAN:  1. Discharge Medication List as of 1/1/2018  4:33 PM      START taking these medications    Details   butalbital-acetaminophen-caffeine (ESGIC) -40 mg per tablet Take 1 Tab by mouth every four (4) hours as needed for Pain. Max Daily Amount: 6 Tabs., Print, Disp-12 Tab, R-0      promethazine (PHENERGAN) 25 mg tablet Take 1 Tab by mouth every six (6) hours as needed. , Print, Disp-12 Tab, R-0           2.    Follow-up Information     Follow up With Details Comments Adi Hatfield MD Schedule an appointment as soon as possible for a visit in 1 day  [address] [facility] [address] [phone]      [facility] EMERGENCY DEPT  If symptoms worsen [address] [facility]  [phone]        Return to ED if worse     Diagnosis     Clinical Impression:   1. Nonintractable headache, unspecified chronicity pattern, unspecified headache type    2. Alcoholic intoxication without complication (HCC)    3. Vasovagal episode        Attestations: This note is prepared by Ismael Moore, acting as Scribe for Pierce Butler MD.    Pierce Butler MD: The scribe's documentation has been prepared under my direction and personally reviewed by me in its entirety. I confirm that the note above accurately reflects all work, treatment, procedures, and medical decision making performed by me.

## 2018-01-02 LAB
ATRIAL RATE: 69 BPM
CALCULATED P AXIS, ECG09: 20 DEGREES
CALCULATED R AXIS, ECG10: 13 DEGREES
CALCULATED T AXIS, ECG11: 13 DEGREES
DIAGNOSIS, 93000: NORMAL
P-R INTERVAL, ECG05: 152 MS
Q-T INTERVAL, ECG07: 370 MS
QRS DURATION, ECG06: 70 MS
QTC CALCULATION (BEZET), ECG08: 396 MS
VENTRICULAR RATE, ECG03: 69 BPM

## 2018-01-03 LAB
BACTERIA SPEC CULT: NORMAL
CC UR VC: NORMAL
SERVICE CMNT-IMP: NORMAL

## 2018-01-09 ENCOUNTER — OFFICE VISIT (OUTPATIENT)
Dept: FAMILY MEDICINE CLINIC | Age: 43
End: 2018-01-09

## 2018-01-09 ENCOUNTER — TELEPHONE (OUTPATIENT)
Dept: FAMILY MEDICINE CLINIC | Age: 43
End: 2018-01-09

## 2018-01-09 VITALS
SYSTOLIC BLOOD PRESSURE: 114 MMHG | OXYGEN SATURATION: 99 % | WEIGHT: 195.2 LBS | BODY MASS INDEX: 27.94 KG/M2 | DIASTOLIC BLOOD PRESSURE: 87 MMHG | TEMPERATURE: 98.6 F | HEIGHT: 70 IN | HEART RATE: 86 BPM | RESPIRATION RATE: 18 BRPM

## 2018-01-09 DIAGNOSIS — G43.009 MIGRAINE WITHOUT AURA AND WITHOUT STATUS MIGRAINOSUS, NOT INTRACTABLE: Primary | ICD-10-CM

## 2018-01-09 DIAGNOSIS — R11.2 NAUSEA AND VOMITING IN ADULT: ICD-10-CM

## 2018-01-09 DIAGNOSIS — R25.2 MUSCLE CRAMPS: ICD-10-CM

## 2018-01-09 DIAGNOSIS — N92.6 IRREGULAR MENSTRUAL CYCLE: ICD-10-CM

## 2018-01-09 DIAGNOSIS — F43.20 ADULT SITUATIONAL STRESS DISORDER: ICD-10-CM

## 2018-01-09 LAB
BILIRUB UR QL STRIP: NEGATIVE
GLUCOSE UR-MCNC: NEGATIVE MG/DL
HCG URINE, QL. (POC): NEGATIVE
KETONES P FAST UR STRIP-MCNC: NEGATIVE MG/DL
PH UR STRIP: 7 [PH] (ref 4.6–8)
PROT UR QL STRIP: NEGATIVE
SP GR UR STRIP: 1.02 (ref 1–1.03)
UA UROBILINOGEN AMB POC: NORMAL (ref 0.2–1)
URINALYSIS CLARITY POC: CLEAR
URINALYSIS COLOR POC: YELLOW
URINE BLOOD POC: NEGATIVE
URINE LEUKOCYTES POC: NEGATIVE
URINE NITRITES POC: NEGATIVE
VALID INTERNAL CONTROL?: YES

## 2018-01-09 RX ORDER — AMITRIPTYLINE HYDROCHLORIDE 25 MG/1
25 TABLET, FILM COATED ORAL
Qty: 30 TAB | Refills: 1 | COMMUNITY
Start: 2018-01-09 | End: 2019-09-24

## 2018-01-09 NOTE — PROGRESS NOTES
[facility] [address] [facility]  [address] [place], [address] [place] [address] Road  [phone]             Date of visit: 1/9/18   Subjective:      History obtained from:  the patient. Rosy Thorpe is a 43 y.o. female who presents today for f/u chronic problems  Still having frequent migraines, maybe a little less severe  Is taking the amitriptyline, can't tell it is helping migraine    Doing better since being out of work last 2 weeks  Has not found therapist but plans to. Not sleeping well, awake a lot at night, thinking a lot  Feeling panicky at times candace when thinking about job, doesn't want to think about it. No longer drinking alcohol to cope. HR being very helpful, told her not to come back until 100%, they want her back eventually. Planning wedding for July 2019 is a positive thing for her    Thought her weight was going up but actually stable from Sept    Little n/v recently for 2 days. No longer has it. Feeling better now. Thinks was stomach bug  Wants to make sure not pregnant, Period late of often irregular anyway    Muscle cramps still bothering her frequently, in legs  not getting exercise much    Patient Active Problem List    Diagnosis Date Noted    Adult situational stress disorder 01/10/2018    Irregular menstrual cycle 01/10/2018    Muscle cramps 01/09/2018    Migraine without aura and without status migrainosus, not intractable 01/09/2018    History of hypothyroidism 10/13/2015    Allergic rhinitis due to allergen 10/13/2015    Asthma 09/24/2014    Depression 09/24/2013     Current Outpatient Prescriptions   Medication Sig Dispense Refill    amitriptyline (ELAVIL) 25 mg tablet Take 1 Tab by mouth nightly. 30 Tab 1    butalbital-acetaminophen-caffeine (ESGIC) -40 mg per tablet Take 1 Tab by mouth every four (4) hours as needed for Pain. Max Daily Amount: 6 Tabs. 12 Tab 0    promethazine (PHENERGAN) 25 mg tablet Take 1 Tab by mouth every six (6) hours as needed. 12 Tab 0     Allergies   Allergen Reactions    Latex Swelling    Latex Rash    Onion Anaphylaxis    Oxycodone Anaphylaxis and Shortness of Breath    Tomato Anaphylaxis    Egg Rash    Hydrocodone Shortness of Breath and Itching    Mushroom Rash    Shellfish Derived Swelling     Past Medical History:   Diagnosis Date    Anemia     Asthma     Fall 2006    fall at work and injured back    Hypothyroidism     hypothyriod     Vitamin D deficiency      Past Surgical History:   Procedure Laterality Date    HX HERNIA REPAIR  10/07/2016    open ventral hernia repair by Dr. Andrew Sherwood. 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      General: stated age, well developed, well nourished and in NAD  Neck: supple, symmetrical, trachea midline, no adenopathy and thyroid: not enlarged, symmetric, no tenderness/mass/nodules  Lungs:  clear to auscultation w/o rales, rhonchi, wheezes w/normal effort and no use of accessory muscles of respiration   Heart: regular rate and rhythm, S1, S2 normal, no murmur, click, rub or gallop  Abdomen: soft, nontender, no masses  Ext: No edema noted. Lymph: no cervical adenopathy appreciated  Skin:  Normal. and no rash or abnormalities   Psych: alert and oriented to person, place, time and situation and Speech: appropriate quality, quantity and organization of sentences     Assessment/Plan:       ICD-10-CM ICD-9-CM    1. Migraine without aura and without status migrainosus, not intractable G43.009 346.10    2. Adult situational stress disorder F43.20 308.9    3. Muscle cramps R25.2 729.82    4. Nausea and vomiting in adult R11.2 787.01 AMB POC URINE PREGNANCY TEST, VISUAL COLOR COMPARISON      AMB POC URINALYSIS DIP STICK MANUAL W/O MICRO   5. Irregular menstrual cycle N92.6 626.4 AMB POC URINE PREGNANCY TEST, VISUAL COLOR COMPARISON        Orders Placed This Encounter    AMB POC URINE PREGNANCY TEST, VISUAL COLOR COMPARISON    AMB POC URINALYSIS DIP STICK MANUAL W/O MICRO    amitriptyline (ELAVIL) 25 mg tablet       Seems much better based on her affect and ability to give history  Migraines not much better but will give the elavil another week, if still not improving may try changing to cymbalta  Really urged the daily exercise and counseling  Stretches at bedtime, drink plenty of water for the cramps  N/v resolved and sounds like it was a GI bug\  Urinalysis not concerning for infection and preg test negative  Would like to get pregnant but not trying yet  Still out of work, possibly for up to 12 weeks  Definitely not functional enough to return now    Discussed the diagnosis and plan and she expressed understanding. Follow-up Disposition:  Return in about 4 weeks (around 2/6/2018).     Kristi Houston MD

## 2018-01-09 NOTE — MR AVS SNAPSHOT
Visit Information Date & Time Provider Department Dept. Phone Encounter #  
 1/9/2018 10:00 AM Ab Lopez, [address] [facility] [address] [phone] [id number] Follow-up Instructions Return in about 4 weeks (around 2/6/2018). Upcoming Health Maintenance Date Due Pneumococcal 19-64 Medium Risk (1 of 1 - PPSV23) 12/7/1994 PAP AKA CERVICAL CYTOLOGY 3/3/2017 DTaP/Tdap/Td series (2 - Td) 1/20/2021 Allergies as of 1/9/2018  Review Complete On: 1/9/2018 By: Ab Lopez MD  
  
 Severity Noted Reaction Type Reactions Latex  09/20/2010    Swelling Latex  05/20/2012    Rash Onion High 06/11/2015    Anaphylaxis Oxycodone High 06/11/2015    Anaphylaxis, Shortness of Breath Tomato High 06/24/2016    Anaphylaxis Egg  10/03/2016    Rash Hydrocodone  06/11/2015    Shortness of Breath, Itching Mushroom  06/24/2016    Rash Shellfish Derived  06/11/2015    Swelling Current Immunizations  Reviewed on 12/26/2017 Name Date Influenza Nasal Vaccine 3/28/2013 MMR 3/28/2013 Tdap 1/20/2011 Not reviewed this visit You Were Diagnosed With   
  
 Codes Comments Irregular menstrual cycle    -  Primary ICD-10-CM: N92.6 ICD-9-CM: 626.4 Nausea and vomiting in adult     ICD-10-CM: R11.2 ICD-9-CM: 787.01 Migraine without aura and without status migrainosus, not intractable     ICD-10-CM: I37.621 ICD-9-CM: 346.10 Muscle cramps     ICD-10-CM: R25.2 ICD-9-CM: 729.82 Vitals BP Pulse Temp Resp Height(growth percentile) Weight(growth percentile) 114/87 (BP 1 Location: Right arm, BP Patient Position: Sitting) 86 98.6 °F (37 °C) (Oral) 18 5' 9.5\" (1.765 m) 195 lb 3.2 oz (88.5 kg) LMP SpO2 BMI OB Status Smoking Status 11/28/2017 99% 28.41 kg/m2 Having regular periods Never Smoker Vitals History BMI and BSA Data  Body Mass Index Body Surface Area  
 28.41 kg/m 2 2.08 m 2  
  
  
 Preferred Pharmacy Pharmacy Name Phone [facility] [address] [phone] Your Updated Medication List  
  
   
This list is accurate as of: 1/9/18 11:09 AM.  Always use your most recent med list.  
  
  
  
  
 amitriptyline 25 mg tablet Commonly known as:  ELAVIL Take 1 Tab by mouth nightly. butalbital-acetaminophen-caffeine -40 mg per tablet Commonly known as:  ESGIC Take 1 Tab by mouth every four (4) hours as needed for Pain. Max Daily Amount: 6 Tabs. promethazine 25 mg tablet Commonly known as:  PHENERGAN Take 1 Tab by mouth every six (6) hours as needed. We Performed the Following AMB POC URINALYSIS DIP STICK MANUAL W/O MICRO [09936 CPT(R)] AMB POC URINE PREGNANCY TEST, VISUAL COLOR COMPARISON [92146 CPT(R)] Follow-up Instructions Return in about 4 weeks (around 2/6/2018). Patient Instructions [facility], [facility] [address] [place] [address], [address] [place] [address] [employer]  
[address] [facility] 33 Phone [phone] Fax [phone] [facility]. [employer] Dr. Lore Singh, [facility] counselor, substance abuse counselor [address] Phone: [phone] Sohail Matias, Ph.D. 
[address] [place] [address] Phone: [phone] FAX: [phone] 
 NMeghann Keyes [address] Phone: [phone] FAX: [phone] 
[address] [place] [address], [address] [facility] [address] [facility] [phone] FAX: [phone] [address] [facility], [address] [facility] [phone] FAX: [phone] [facility], [address], [address] [facility] [address] [facility] [phone] [facility] [place] [facility] Dudley Chad Samayoa, Ph.D 
[address] [place], [address] [facility] [address]  
[phone] [facility] [address] 1099 [facility] Center Lucrecia Quintanilla, [address] 
[phone] [facility] Office [address] [place] [address], 15 [facility] [address] [facility] [employer] [facility] [address] 
[phone] [facility], 750 [facility] 7833, Call, [address] [phone] [facility] Fili Lemus ([place] near [facility]) [phone] [address] Location 1230 [facility] 33 [address], [address] [place] [address] [address] 
[phone] Learning About Mindfulness for Stress What are mindfulness and stress? Stress is what you feel when you have to handle more than you are used to. A lot of things can cause stress. You may feel stress when you go on a job interview, take a test, or run a race. This kind of short-term stress is normal and even useful. It can help you if you need to work hard or react quickly. Stress also can last a long time. Long-term stress is caused by stressful situations or events. Examples of long-term stress include long-term health problems, ongoing problems at work, and conflicts in your family. Long-term stress can harm your health. Mindfulness is a focus only on things happening in the present moment. It's a process of purposefully paying attention to and being aware of your surroundings, your emotions, your thoughts, and how your body feels. You are aware of these things, but you aren't judging these experiences as \"good\" or \"bad. \" Mindfulness can help you learn to calm your mind and body to help you cope with illness, pain, and stress. How does mindfulness help to relieve stress? Mindfulness can help quiet your mind and relax your body.  Studies show that it can help some people sleep better, feel less anxious, and bring their blood pressure down. And it's been shown to help some people live and cope better with certain health problems like heart disease, depression, chronic pain, and cancer. How do you practice mindfulness? To be mindful is to pay attention, to be present, and to be accepting. · When you're mindful, you do just one thing and you pay close attention to that one thing. For example, you may sit quietly and notice your emotions or how your food tastes and smells. · When you're present, you focus on the things that are happening right now. You let go of your thoughts about the past and the future. When you dwell on the past or the future, you miss moments that can heal and strengthen you. You may miss moments like hearing a child laugh or seeing a friendly face when you think you're all alone. · When you're accepting, you don't  the present moment. Instead you accept your thoughts and feelings as they come. You can practice anytime, anywhere, and in any way you choose. You can practice in many ways. Here are a few ideas: · While doing your chores, like washing the dishes, let your mind focus on what's in your hand. What does the dish feel like? Is the water warm or cold? · Go outside and take a few deep breaths. What is the air like? Is it warm or cold? · When you can, take some time at the start of your day to sit alone and think. · Take a slow walk by yourself. Count your steps while you breathe in and out. · Try yoga breathing exercises, stretches, and poses to strengthen and relax your muscles. · At work, if you can, try to stop for a few moments each hour. Note how your body feels. Let yourself regroup and let your mind settle before you return to what you were doing. · If you struggle with anxiety or \"worry thoughts,\" imagine your mind as a blue sheron and your worry thoughts as clouds.  Now imagine those worry thoughts floating across your mind's sheron. Just let them pass by as you watch. Follow-up care is a key part of your treatment and safety. Be sure to make and go to all appointments, and call your doctor if you are having problems. It's also a good idea to know your test results and keep a list of the medicines you take. Where can you learn more? Go to http://danika-sarah.info/. Enter O691 in the search box to learn more about \"Learning About Mindfulness for Stress. \" Current as of: July 26, 2016 Content Version: 11.4 © 7879-0114 [employer]. Care instructions adapted under license by [employer] (which disclaims liability or warranty for this information). If you have questions about a medical condition or this instruction, always ask your healthcare professional. [address] any warranty or liability for your use of this information. Please do try to get exercise every day--a walk would be perfect Introducing [facility] & HEALTH SERVICES! Dear Evgeny Gordon: Thank you for requesting a [employer] account. Our records indicate that you already have an active [employer] account. You can access your account anytime at [email or web address] Did you know that you can access your hospital and ER discharge instructions at any time in [employer]? You can also review all of your test results from your hospital stay or ER visit. Additional Information If you have questions, please visit the Frequently Asked Questions section of the [employer] website at [email or web address] Remember, [employer] is NOT to be used for urgent needs. For medical emergencies, dial 911. Now available from your iPhone and Android! Please provide this summary of care documentation to your next provider. Your primary care clinician is listed as Eduard Combs.  If you have any questions after today's visit, please call [phone].

## 2018-01-09 NOTE — PROGRESS NOTES
Chief Complaint   Patient presents with    Migraine     follow up     Stress     follow up    Vomiting     nausea, whoozy feeling        Reviewed Record in preparation for visit and have obtained necessary documentation. Identified pt with two pt identifiers (Name @ )    Health Maintenance Due   Topic    Pneumococcal 19-64 Medium Risk (1 of 1 - PPSV23)    PAP AKA CERVICAL CYTOLOGY          1. Have you been to the ER, urgent care clinic since your last visit? Hospitalized since your last visit? No    2. Have you seen or consulted any other health care providers outside of the [address] [place] [address] since your last visit? Include any pap smears or colon screening.  No

## 2018-01-09 NOTE — TELEPHONE ENCOUNTER
----- Message from Leland Garcia sent at 1/9/2018  7:10 AM EST -----  Regarding: Dr. Feliciano Ramon  The patient was told to schedule an appointment for this week. There is no appointment available and the patient is requesting a call back from the nurse with an appointment to be seen.  P)[phone]

## 2018-01-09 NOTE — PATIENT INSTRUCTIONS
[facility], [facility]  [address], [address] [facility], [place] [address]   [employer]   [address]   [place], [address]  Phone [phone]  Fax [phone]  [facility]. [employer]    Dr. Myron Kanner, [address] counselor, substance abuse counselor  [address]  Phone: [phone]    Anton Ruth, Ph.D.  [address] Office  Phone: [phone]  FAX: [phone]  [address]  Phone: [phone]  FAX: [phone]  201 [facility], [address]  [address]  [phone]  FAX: [phone] [address] [place] [address], [address]    [facility] Office  [phone]  FAX: [phone] [facility] [place], 295 [facility], [facility] [address] 40 [facility], [address]  [phone]    [facility] [place] [facility] Marta Stout, Ph.D  Gilda Steinberg [address] [facility], [address] [facility] [address]   [phone]    The [address] [facility] Office  [address] [facility], [address]  [phone]    [facility] [facility]  [address], [address], [address] [place] [address], Marta Valentine, [address]   [phone]    [facility] ([place] near [facility])  [phone]    [facility] Counseling 250 [facility] Location  [address], [address]    47 [facility]  [address] [place] [address], [address] [place], [place] [address], 8111 [facility]  [phone]         Learning About Mindfulness for Stress  What are mindfulness and stress? Stress is what you feel when you have to handle more than you are used to. A lot of things can cause stress.  You may feel stress when you go on a job interview, take a test, or run a race. This kind of short-term stress is normal and even useful. It can help you if you need to work hard or react quickly. Stress also can last a long time. Long-term stress is caused by stressful situations or events. Examples of long-term stress include long-term health problems, ongoing problems at work, and conflicts in your family. Long-term stress can harm your health. Mindfulness is a focus only on things happening in the present moment. It's a process of purposefully paying attention to and being aware of your surroundings, your emotions, your thoughts, and how your body feels. You are aware of these things, but you aren't judging these experiences as \"good\" or \"bad. \" Mindfulness can help you learn to calm your mind and body to help you cope with illness, pain, and stress. How does mindfulness help to relieve stress? Mindfulness can help quiet your mind and relax your body. Studies show that it can help some people sleep better, feel less anxious, and bring their blood pressure down. And it's been shown to help some people live and cope better with certain health problems like heart disease, depression, chronic pain, and cancer. How do you practice mindfulness? To be mindful is to pay attention, to be present, and to be accepting. · When you're mindful, you do just one thing and you pay close attention to that one thing. For example, you may sit quietly and notice your emotions or how your food tastes and smells. · When you're present, you focus on the things that are happening right now. You let go of your thoughts about the past and the future. When you dwell on the past or the future, you miss moments that can heal and strengthen you. You may miss moments like hearing a child laugh or seeing a friendly face when you think you're all alone. · When you're accepting, you don't  the present moment. Instead you accept your thoughts and feelings as they come.   You can practice anytime, anywhere, and in any way you choose. You can practice in many ways. Here are a few ideas:  · While doing your chores, like washing the dishes, let your mind focus on what's in your hand. What does the dish feel like? Is the water warm or cold? · Go outside and take a few deep breaths. What is the air like? Is it warm or cold? · When you can, take some time at the start of your day to sit alone and think. · Take a slow walk by yourself. Count your steps while you breathe in and out. · Try yoga breathing exercises, stretches, and poses to strengthen and relax your muscles. · At work, if you can, try to stop for a few moments each hour. Note how your body feels. Let yourself regroup and let your mind settle before you return to what you were doing. · If you struggle with anxiety or \"worry thoughts,\" imagine your mind as a blue sheron and your worry thoughts as clouds. Now imagine those worry thoughts floating across your mind's sheron. Just let them pass by as you watch. Follow-up care is a key part of your treatment and safety. Be sure to make and go to all appointments, and call your doctor if you are having problems. It's also a good idea to know your test results and keep a list of the medicines you take. Where can you learn more? Go to http://danika-sarah.info/. Enter T171 in the search box to learn more about \"Learning About Mindfulness for Stress. \"  Current as of: July 26, 2016  Content Version: 11.4  © 1682-4637 Healthwise, Incorporated. Care instructions adapted under license by [employer] (which disclaims liability or warranty for this information). If you have questions about a medical condition or this instruction, always ask your healthcare professional. [address] any warranty or liability for your use of this information.     Please do try to get exercise every day--a walk would be perfect    My Chart Patient Assistance Desk  [phone]

## 2018-01-10 PROBLEM — N92.6 IRREGULAR MENSTRUAL CYCLE: Status: ACTIVE | Noted: 2018-01-10

## 2018-01-10 PROBLEM — F43.20 ADULT SITUATIONAL STRESS DISORDER: Status: ACTIVE | Noted: 2018-01-10

## 2018-01-31 ENCOUNTER — TELEPHONE (OUTPATIENT)
Dept: FAMILY MEDICINE CLINIC | Age: 43
End: 2018-01-31

## 2018-01-31 NOTE — TELEPHONE ENCOUNTER
Called Sukh Munoz back. LM that I would fax over last two OV notes. I faxed it to [phone] that same # I faxed the forms too.

## 2018-01-31 NOTE — TELEPHONE ENCOUNTER
Alejandro Condon, nurse  is calling from [facility] regarding patients disability forms. Alejandro Condon states that she would like to receive the last two office visit notes. Ashly's number: [phone] ext.  [phone]  1/31/18

## 2018-02-28 ENCOUNTER — TELEPHONE (OUTPATIENT)
Dept: FAMILY MEDICINE CLINIC | Age: 43
End: 2018-02-28

## 2018-02-28 NOTE — TELEPHONE ENCOUNTER
Called Kristi Nash back. She states she needs the date of last OV. Advised that it was 1/9/18. Kristi Nash wanted to know if pt came for the 2/9 appt. Advised no she didn't. She wanted to know if pt was scheduled for a f/u appt and when. Advised that she isn't scheduled at this time. Kristi Nash states that she hasn't been able to reach the pt. Kristi Nash also wanted to know if pt was seeing a counselor yet. Advised that I didn't know if she was or not. Kristi Nash said that was all she needed.

## 2018-02-28 NOTE — TELEPHONE ENCOUNTER
----- Message from Adryan Sullivan sent at 2/27/2018  4:09 PM EST -----  Regarding: Dr. Martinez [facility]: [phone]  Ms. Mueller Margarita, from [facility], requesting to speak with Dr. Arlette Kuo nurse in regards to pt. counseling services. Ms. Mueller Margarita best contact number [phone] ext [phone]. Ms. Mueller Margarita requesting to have recent office visit notes faxed to [facility] in [place]. [phone]

## 2019-05-01 ENCOUNTER — HOSPITAL ENCOUNTER (OUTPATIENT)
Dept: MAMMOGRAPHY | Age: 44
Discharge: HOME | End: 2019-05-01
Attending: FAMILY MEDICINE
Payer: COMMERCIAL

## 2019-05-01 DIAGNOSIS — Z12.31 VISIT FOR SCREENING MAMMOGRAM: ICD-10-CM

## 2019-05-01 PROCEDURE — 77067 SCR MAMMO BI INCL CAD: CPT

## 2019-05-30 ENCOUNTER — TELEPHONE (OUTPATIENT)
Dept: FAMILY MEDICINE CLINIC | Age: 44
End: 2019-05-30

## 2019-05-30 NOTE — TELEPHONE ENCOUNTER
Chasidy Uribe from [facility] is calling stating that pt had her mammogram apt on May 15th 8:45, am but she did not show up and they tried calling her and send her the letter but no response ,   Chasidy Uribe wanted to let Dr Praveena Manning to know    [facility]# [phone]

## 2019-06-13 ENCOUNTER — TELEPHONE (OUTPATIENT)
Dept: FAMILY MEDICINE CLINIC | Age: 44
End: 2019-06-13

## 2019-06-16 ENCOUNTER — APPOINTMENT (OUTPATIENT)
Dept: GENERAL RADIOLOGY | Age: 44
End: 2019-06-16
Attending: EMERGENCY MEDICINE
Payer: COMMERCIAL

## 2019-06-16 ENCOUNTER — HOSPITAL ENCOUNTER (EMERGENCY)
Age: 44
Discharge: HOME | End: 2019-06-16
Attending: EMERGENCY MEDICINE
Payer: COMMERCIAL

## 2019-06-16 VITALS
HEART RATE: 69 BPM | TEMPERATURE: 98 F | OXYGEN SATURATION: 100 % | SYSTOLIC BLOOD PRESSURE: 121 MMHG | DIASTOLIC BLOOD PRESSURE: 90 MMHG | RESPIRATION RATE: 17 BRPM

## 2019-06-16 DIAGNOSIS — R51.9 NONINTRACTABLE EPISODIC HEADACHE, UNSPECIFIED HEADACHE TYPE: Primary | ICD-10-CM

## 2019-06-16 DIAGNOSIS — R07.9 ACUTE CHEST PAIN: ICD-10-CM

## 2019-06-16 DIAGNOSIS — R10.84 ABDOMINAL PAIN, GENERALIZED: ICD-10-CM

## 2019-06-16 LAB
ALBUMIN SERPL-MCNC: 3.7 G/DL (ref 3.5–5)
ALBUMIN/GLOB SERPL: 1.1 {RATIO} (ref 1.1–2.2)
ALP SERPL-CCNC: 73 U/L (ref 45–117)
ALT SERPL-CCNC: 12 U/L (ref 12–78)
ANION GAP SERPL CALC-SCNC: 6 MMOL/L (ref 5–15)
AST SERPL-CCNC: 8 U/L (ref 15–37)
ATRIAL RATE: 73 BPM
BASOPHILS # BLD: 0 K/UL (ref 0–0.1)
BASOPHILS NFR BLD: 1 % (ref 0–1)
BILIRUB SERPL-MCNC: 0.5 MG/DL (ref 0.2–1)
BUN SERPL-MCNC: 12 MG/DL (ref 6–20)
BUN/CREAT SERPL: 13 (ref 12–20)
CALCIUM SERPL-MCNC: 9.1 MG/DL (ref 8.5–10.1)
CALCULATED P AXIS, ECG09: 37 DEGREES
CALCULATED R AXIS, ECG10: 39 DEGREES
CALCULATED T AXIS, ECG11: 31 DEGREES
CHLORIDE SERPL-SCNC: 109 MMOL/L (ref 97–108)
CO2 SERPL-SCNC: 26 MMOL/L (ref 21–32)
COMMENT, HOLDF: NORMAL
CREAT SERPL-MCNC: 0.89 MG/DL (ref 0.55–1.02)
DIAGNOSIS, 93000: NORMAL
DIFFERENTIAL METHOD BLD: NORMAL
EOSINOPHIL # BLD: 0.1 K/UL (ref 0–0.4)
EOSINOPHIL NFR BLD: 1 % (ref 0–7)
ERYTHROCYTE [DISTWIDTH] IN BLOOD BY AUTOMATED COUNT: 13.2 % (ref 11.5–14.5)
GLOBULIN SER CALC-MCNC: 3.4 G/DL (ref 2–4)
GLUCOSE SERPL-MCNC: 94 MG/DL (ref 65–100)
HCG UR QL: NEGATIVE
HCT VFR BLD AUTO: 37.8 % (ref 35–47)
HGB BLD-MCNC: 12.1 G/DL (ref 11.5–16)
IMM GRANULOCYTES # BLD AUTO: 0 K/UL (ref 0–0.04)
IMM GRANULOCYTES NFR BLD AUTO: 0 % (ref 0–0.5)
LIPASE SERPL-CCNC: 111 U/L (ref 73–393)
LYMPHOCYTES # BLD: 1.7 K/UL (ref 0.8–3.5)
LYMPHOCYTES NFR BLD: 28 % (ref 12–49)
MCH RBC QN AUTO: 30.1 PG (ref 26–34)
MCHC RBC AUTO-ENTMCNC: 32 G/DL (ref 30–36.5)
MCV RBC AUTO: 94 FL (ref 80–99)
MONOCYTES # BLD: 0.5 K/UL (ref 0–1)
MONOCYTES NFR BLD: 9 % (ref 5–13)
NEUTS SEG # BLD: 3.6 K/UL (ref 1.8–8)
NEUTS SEG NFR BLD: 61 % (ref 32–75)
NRBC # BLD: 0 K/UL (ref 0–0.01)
NRBC BLD-RTO: 0 PER 100 WBC
P-R INTERVAL, ECG05: 134 MS
PLATELET # BLD AUTO: 194 K/UL (ref 150–400)
PMV BLD AUTO: 10.2 FL (ref 8.9–12.9)
POTASSIUM SERPL-SCNC: 3.9 MMOL/L (ref 3.5–5.1)
PROT SERPL-MCNC: 7.1 G/DL (ref 6.4–8.2)
Q-T INTERVAL, ECG07: 362 MS
QRS DURATION, ECG06: 72 MS
QTC CALCULATION (BEZET), ECG08: 398 MS
RBC # BLD AUTO: 4.02 M/UL (ref 3.8–5.2)
SAMPLES BEING HELD,HOLD: NORMAL
SODIUM SERPL-SCNC: 141 MMOL/L (ref 136–145)
TROPONIN I SERPL-MCNC: <0.05 NG/ML
VENTRICULAR RATE, ECG03: 73 BPM
WBC # BLD AUTO: 5.9 K/UL (ref 3.6–11)

## 2019-06-16 PROCEDURE — 93005 ELECTROCARDIOGRAM TRACING: CPT

## 2019-06-16 PROCEDURE — 85025 COMPLETE CBC W/AUTO DIFF WBC: CPT

## 2019-06-16 PROCEDURE — 84484 ASSAY OF TROPONIN QUANT: CPT

## 2019-06-16 PROCEDURE — 80053 COMPREHEN METABOLIC PANEL: CPT

## 2019-06-16 PROCEDURE — 74022 RADEX COMPL AQT ABD SERIES: CPT

## 2019-06-16 PROCEDURE — 99285 EMERGENCY DEPT VISIT HI MDM: CPT

## 2019-06-16 PROCEDURE — 83690 ASSAY OF LIPASE: CPT

## 2019-06-16 PROCEDURE — 81025 URINE PREGNANCY TEST: CPT

## 2019-06-16 NOTE — ED PROVIDER NOTES
37 y.o. female with past medical history significant for asthma, hypothyroidism, anemia, and vitamin D deficiency who presents via EMS with chief complaint of abdominal pain. Pt presents to the ED and reports intermittent abdominal pain, intermittent chest tightness, headache, and dizziness with a spinning sensation which began yesterday morning 6/15/19. PT describes the abdominal pain as a LUQ and upper middle pain and notes that nothing exacerbates it and tylenol helps alleviate it. Pt states that the chest tightness episodes all lasted about 2-3 minutes and notes that they are always accompanied with wheezing. 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 Pt also notes that she went to work today 6/16/19, had another episode and was taken off the bus and sent to ED for eval by her employers. PT reports that she uses albuterol breathing treatments at home 2x/day. Pt denies any SOB, hx of heart problems, or taking any medication to help with her abdominal pain prior to ED arrival.    There are no other acute medical concerns at this time. Surgical hx - ventral hernia repair, right wrist fracture repair   Social hx - Tobacco use: non-smoker, Alcohol Use: rarely, Drug Use: denies    PCP: Fred Ashby MD    Note written by Todd Marte, as dictated by Libbie Litten, MD 7:23 AM.      The history is provided by the patient. No  was used. Past Medical History:   Diagnosis Date    Anemia     Asthma     Fall 2006    fall at work and injured back    Hypothyroidism     hypothyriod     Vitamin D deficiency        Past Surgical History:   Procedure Laterality Date    HX HERNIA REPAIR  10/07/2016    open ventral hernia repair by Dr. Lara Aquino.     HX HERNIA REPAIR  10/07/2016    open ventral hernia repair by Dr Lalito Beckett Right 1/2015    had to rebrake because u=it healed wrong         Family History:   Problem Relation Age of Onset    Heart Disease Maternal Grandmother     Stroke Maternal Grandmother        Social History     Socioeconomic History    Marital status: SINGLE     Spouse name: Not on file    Number of children: Not on file    Years of education: Not on file    Highest education level: Not on file   Occupational History    Not on file   Social Needs    Financial resource strain: Not on file    Food insecurity:     Worry: Not on file     Inability: Not on file    Transportation needs:     Medical: Not on file     Non-medical: Not on file   Tobacco Use    Smoking status: Never Smoker    Smokeless tobacco: Never Used   Substance and Sexual Activity    Alcohol use: Yes     Alcohol/week: 0.0 oz     Comment: rare    Drug use: No    Sexual activity: Never   Lifestyle    Physical activity:     Days per week: Not on file     Minutes per session: Not on file    Stress: Not on file   Relationships    Social connections:     Talks on phone: Not on file     Gets together: Not on file     Attends Sikhism service: Not on file     Active member of club or organization: Not on file     Attends meetings of clubs or organizations: Not on file     Relationship status: Not on file    Intimate partner violence:     Fear of current or ex partner: Not on file     Emotionally abused: Not on file     Physically abused: Not on file     Forced sexual activity: Not on file   Other Topics Concern    Not on file   Social History Narrative    Works in a detention with juveniles, very stressful job    Has autistic son with her    Older son lives in [facility] 23: Latex; Latex; Onion; Oxycodone; Tomato; Egg; Hydrocodone; Mushroom; and Shellfish derived    Review of Systems   Constitutional: Negative for appetite change, chills and fever. HENT: Negative for rhinorrhea, sore throat and trouble swallowing. Eyes: Negative for photophobia.    Respiratory: Positive for chest tightness and wheezing. Negative for shortness of breath. Cardiovascular: Negative for chest pain and palpitations. Gastrointestinal: Positive for abdominal pain. Negative for nausea and vomiting. Genitourinary: Negative for dysuria, frequency and hematuria. Musculoskeletal: Negative for arthralgias. Neurological: Positive for dizziness and headaches. Negative for syncope and weakness. Psychiatric/Behavioral: Negative for behavioral problems. The patient is not nervous/anxious. All other systems reviewed and are negative. Vitals:    06/16/19 0716   BP: 121/83   Pulse: 76   Resp: 20   Temp: 98.3 °F (36.8 °C)   SpO2: 100%            Physical Exam   Constitutional: She appears well-developed and well-nourished. HENT:   Head: Normocephalic and atraumatic. Mouth/Throat: Oropharynx is clear and moist.   Eyes: Pupils are equal, round, and reactive to light. EOM are normal.   Neck: Normal range of motion. Neck supple. Cardiovascular: Normal rate, regular rhythm, normal heart sounds and intact distal pulses. Exam reveals no gallop and no friction rub. No murmur heard. Pulmonary/Chest: Effort normal. No respiratory distress. She has no wheezes. She has no rales. Abdominal: Soft. There is no tenderness. There is no rebound. Musculoskeletal: Normal range of motion. She exhibits no tenderness. Neurological: She is alert. No cranial nerve deficit. Motor; symmetric   Skin: No erythema. Psychiatric: She has a normal mood and affect. Her behavior is normal.   Nursing note and vitals reviewed. Note written by Todd Jiménez, as dictated by Laney Ventura MD 7:23 AM     MDM       Procedures      ED EKG interpretation:  Rhythm: normal sinus rhythm; and regular . Rate (approx.): 75; Axis: normal; P wave: normal; QRS interval: normal ; ST/T wave: normal; in  Lead: ; Other findings: . This EKG was interpreted by Escobar Reynolds MD,ED Provider.  1:14 PM

## 2019-06-16 NOTE — LETTER
[address] 
[address] [facility] [address] 
[phone] Work Note Date: 6/16/2019 To Whom It May concern: 
 
Donovan Licea was seen and treated today in the emergency room by the following provider(s): 
Attending Provider: Hoda Castañeda MD.   
 
Donovan Licea may return to work on 6/17/19.  
 
Sincerely,

## 2019-06-16 NOTE — DISCHARGE INSTRUCTIONS
Patient Education        Abdominal Pain: Care Instructions  Your Care Instructions    Abdominal pain has many possible causes. Some aren't serious and get better on their own in a few days. Others need more testing and treatment. If your pain continues or gets worse, you need to be rechecked and may need more tests to find out what is wrong. You may need surgery to correct the problem. Don't ignore new symptoms, such as fever, nausea and vomiting, urination problems, pain that gets worse, and dizziness. These may be signs of a more serious problem. Your doctor may have recommended a follow-up visit in the next 8 to 12 hours. If you are not getting better, you may need more tests or treatment. The doctor has checked you carefully, but problems can develop later. If you notice any problems or new symptoms, get medical treatment right away. Follow-up care is a key part of your treatment and safety. Be sure to make and go to all appointments, and call your doctor if you are having problems. It's also a good idea to know your test results and keep a list of the medicines you take. How can you care for yourself at home? · Rest until you feel better. · To prevent dehydration, drink plenty of fluids, enough so that your urine is light yellow or clear like water. Choose water and other caffeine-free clear liquids until you feel better. If you have kidney, heart, or liver disease and have to limit fluids, talk with your doctor before you increase the amount of fluids you drink. · If your stomach is upset, eat mild foods, such as rice, dry toast or crackers, bananas, and applesauce. Try eating several small meals instead of two or three large ones. · Wait until 48 hours after all symptoms have gone away before you have spicy foods, alcohol, and drinks that contain caffeine. · Do not eat foods that are high in fat. · Avoid anti-inflammatory medicines such as aspirin, ibuprofen (Advil, Motrin), and naproxen (Aleve). These can cause stomach upset. Talk to your doctor if you take daily aspirin for another health problem. When should you call for help? Call 911 anytime you think you may need emergency care. For example, call if:    · You passed out (lost consciousness).     · You pass maroon or very bloody stools.     · You vomit blood or what looks like coffee grounds.     · You have new, severe belly pain.    Call your doctor now or seek immediate medical care if:    · Your pain gets worse, especially if it becomes focused in one area of your belly.     · You have a new or higher fever.     · Your stools are black and look like tar, or they have streaks of blood.     · You have unexpected vaginal bleeding.     · You have symptoms of a urinary tract infection. These may include:  ? Pain when you urinate. ? Urinating more often than usual.  ? Blood in your urine.     · You are dizzy or lightheaded, or you feel like you may faint.    Watch closely for changes in your health, and be sure to contact your doctor if:    · You are not getting better after 1 day (24 hours). Where can you learn more? Go to http://danikaHRBosssarah.info/. Enter Y333 in the search box to learn more about \"Abdominal Pain: Care Instructions. \"  Current as of: September 23, 2018  Content Version: 11.9  © 0044-3990 [employer]. Care instructions adapted under license by [employer] (which disclaims liability or warranty for this information). If you have questions about a medical condition or this instruction, always ask your healthcare professional. [place] [address] any warranty or liability for your use of this information. Patient Education        Chest Pain: Care Instructions  Your Care Instructions    There are many things that can cause chest pain. Some are not serious and will get better on their own in a few days. But some kinds of chest pain need more testing and treatment.  Your doctor may have recommended a follow-up visit in the next 8 to 12 hours. If you are not getting better, you may need more tests or treatment. Even though your doctor has released you, you still need to watch for any problems. The doctor carefully checked you, but sometimes problems can develop later. If you have new symptoms or if your symptoms do not get better, get medical care right away. If you have worse or different chest pain or pressure that lasts more than 5 minutes or you passed out (lost consciousness), call 911 or seek other emergency help right away. A medical visit is only one step in your treatment. Even if you feel better, you still need to do what your doctor recommends, such as going to all suggested follow-up appointments and taking medicines exactly as directed. This will help you recover and help prevent future problems. How can you care for yourself at home? · Rest until you feel better. · Take your medicine exactly as prescribed. Call your doctor if you think you are having a problem with your medicine. · Do not drive after taking a prescription pain medicine. When should you call for help? Call 911 if:    · You passed out (lost consciousness).     · You have severe difficulty breathing.     · You have symptoms of a heart attack. These may include:  ? Chest pain or pressure, or a strange feeling in your chest.  ? Sweating. ? Shortness of breath. ? Nausea or vomiting. ? Pain, pressure, or a strange feeling in your back, neck, jaw, or upper belly or in one or both shoulders or arms. ? Lightheadedness or sudden weakness. ? A fast or irregular heartbeat. After you call 911, the  may tell you to chew 1 adult-strength or 2 to 4 low-dose aspirin. Wait for an ambulance.  Do not try to drive yourself.    Call your doctor today if:    · You have any trouble breathing.     · Your chest pain gets worse.     · You are dizzy or lightheaded, or you feel like you may faint.     · You are not getting better as expected.     · You are having new or different chest pain. Where can you learn more? Go to http://danika-sarha.info/. Enter A120 in the search box to learn more about \"Chest Pain: Care Instructions. \"  Current as of: September 23, 2018  Content Version: 11.9  © 6950-5831 [facility]. Care instructions adapted under license by [employer] (which disclaims liability or warranty for this information). If you have questions about a medical condition or this instruction, always ask your healthcare professional. [place] [address] any warranty or liability for your use of this information. Patient Education        Headache: Care Instructions  Your Care Instructions    Headaches have many possible causes. Most headaches aren't a sign of a more serious problem, and they will get better on their own. Home treatment may help you feel better faster. The doctor has checked you carefully, but problems can develop later. If you notice any problems or new symptoms, get medical treatment right away. Follow-up care is a key part of your treatment and safety. Be sure to make and go to all appointments, and call your doctor if you are having problems. It's also a good idea to know your test results and keep a list of the medicines you take. How can you care for yourself at home? · Do not drive if you have taken a prescription pain medicine. · Rest in a quiet, dark room until your headache is gone. Close your eyes and try to relax or go to sleep. Don't watch TV or read. · Put a cold, moist cloth or cold pack on the painful area for 10 to 20 minutes at a time. Put a thin cloth between the cold pack and your skin. · Use a warm, moist towel or a heating pad set on low to relax tight shoulder and neck muscles. · Have someone gently massage your neck and shoulders. · Take pain medicines exactly as directed.   ? If the doctor gave you a prescription medicine for pain, take it as prescribed. ? If you are not taking a prescription pain medicine, ask your doctor if you can take an over-the-counter medicine. · Be careful not to take pain medicine more often than the instructions allow, because you may get worse or more frequent headaches when the medicine wears off. · Do not ignore new symptoms that occur with a headache, such as a fever, weakness or numbness, vision changes, or confusion. These may be signs of a more serious problem. To prevent headaches  · Keep a headache diary so you can figure out what triggers your headaches. Avoiding triggers may help you prevent headaches. Record when each headache began, how long it lasted, and what the pain was like (throbbing, aching, stabbing, or dull). Write down any other symptoms you had with the headache, such as nausea, flashing lights or dark spots, or sensitivity to bright light or loud noise. Note if the headache occurred near your period. List anything that might have triggered the headache, such as certain foods (chocolate, cheese, wine) or odors, smoke, bright light, stress, or lack of sleep. · Find healthy ways to deal with stress. Headaches are most common during or right after stressful times. Take time to relax before and after you do something that has caused a headache in the past.  · Try to keep your muscles relaxed by keeping good posture. Check your jaw, face, neck, and shoulder muscles for tension, and try relaxing them. When sitting at a desk, change positions often, and stretch for 30 seconds each hour. · Get plenty of sleep and exercise. · Eat regularly and well. Long periods without food can trigger a headache. · Treat yourself to a massage. Some people find that regular massages are very helpful in relieving tension. · Limit caffeine by not drinking too much coffee, tea, or soda. But don't quit caffeine suddenly, because that can also give you headaches.   · Reduce eyestrain from computers by blinking frequently and looking away from the computer screen every so often. Make sure you have proper eyewear and that your monitor is set up properly, about an arm's length away. · Seek help if you have depression or anxiety. Your headaches may be linked to these conditions. Treatment can both prevent headaches and help with symptoms of anxiety or depression. When should you call for help? Call 911 anytime you think you may need emergency care. For example, call if:    · You have signs of a stroke. These may include:  ? Sudden numbness, paralysis, or weakness in your face, arm, or leg, especially on only one side of your body. ? Sudden vision changes. ? Sudden trouble speaking. ? Sudden confusion or trouble understanding simple statements. ? Sudden problems with walking or balance. ? A sudden, severe headache that is different from past headaches.    Call your doctor now or seek immediate medical care if:    · You have a new or worse headache.     · Your headache gets much worse. Where can you learn more? Go to http://danika-sarah.info/. Enter M271 in the search box to learn more about \"Headache: Care Instructions. \"  Current as of: Mary 3, 2018  Content Version: 11.9  © 9875-6101 [employer]. Care instructions adapted under license by [employer] (which disclaims liability or warranty for this information). If you have questions about a medical condition or this instruction, always ask your healthcare professional. [place] [address] any warranty or liability for your use of this information.

## 2019-06-16 NOTE — ED TRIAGE NOTES
PT arrives via EMS d/t abdominal pain and nausea that started yesterday. PT reports Chest tightness, and bilateral shoulder pain that lasted about 15 minutes. PT denies any current CP but still has abdominal pain and nausea. Humberto Pichardo Hx of asthma.

## 2019-06-16 NOTE — ED NOTES
Bedside shift change report given to Jose (oncoming nurse) by [facility] (offgoing nurse). Report included the following information SBAR, ED Summary and MAR.

## 2019-06-18 ENCOUNTER — HOSPITAL ENCOUNTER (OUTPATIENT)
Dept: MAMMOGRAPHY | Age: 44
Discharge: HOME | End: 2019-06-18
Attending: FAMILY MEDICINE
Payer: COMMERCIAL

## 2019-06-18 DIAGNOSIS — R92.8 ABNORMAL MAMMOGRAM OF RIGHT BREAST: ICD-10-CM

## 2019-06-18 PROCEDURE — 77065 DX MAMMO INCL CAD UNI: CPT

## 2019-06-18 PROCEDURE — 76642 ULTRASOUND BREAST LIMITED: CPT

## 2019-08-19 ENCOUNTER — TELEPHONE (OUTPATIENT)
Dept: FAMILY MEDICINE CLINIC | Age: 44
End: 2019-08-19

## 2019-08-19 NOTE — TELEPHONE ENCOUNTER
Patient is calling stating that she had the flu, diarrhea for a while. Pt stated that the symptoms has gone away. Pt has been out of work for a while. Pt stated that [facility] to [employer] [facility], and they told the pt she needs to see her doctor to be cleared to go back to work. Pt is requesting to see Dr. Melissa Nayak tomorrow so she can return back to work Wednesday. Let pt know Dr. Melissa Nayak is completely booked for August, and to call back to check for same day appointments. Pt is requesting a call back from the nurse in regards to getting a appointment.         Best callback:  [phone]      LOV:  Tuesday, January 09, 2018

## 2019-08-20 ENCOUNTER — OFFICE VISIT (OUTPATIENT)
Dept: FAMILY MEDICINE CLINIC | Age: 44
End: 2019-08-20

## 2019-08-20 VITALS
HEART RATE: 85 BPM | HEIGHT: 70 IN | RESPIRATION RATE: 17 BRPM | WEIGHT: 178.2 LBS | SYSTOLIC BLOOD PRESSURE: 116 MMHG | OXYGEN SATURATION: 100 % | TEMPERATURE: 98.3 F | DIASTOLIC BLOOD PRESSURE: 87 MMHG | BODY MASS INDEX: 25.51 KG/M2

## 2019-08-20 DIAGNOSIS — J06.9 VIRAL URI WITH COUGH: Primary | ICD-10-CM

## 2019-08-20 DIAGNOSIS — J45.909 UNCOMPLICATED ASTHMA, UNSPECIFIED ASTHMA SEVERITY, UNSPECIFIED WHETHER PERSISTENT: ICD-10-CM

## 2019-08-20 DIAGNOSIS — R52 BODY ACHES: ICD-10-CM

## 2019-08-20 RX ORDER — BENZONATATE 100 MG/1
100-200 CAPSULE ORAL
Qty: 30 CAP | Refills: 0 | Status: SHIPPED | OUTPATIENT
Start: 2019-08-20 | End: 2019-08-30

## 2019-08-20 RX ORDER — IBUPROFEN 800 MG/1
800 TABLET ORAL
Qty: 90 TAB | Refills: 0 | Status: SHIPPED | OUTPATIENT
Start: 2019-08-20 | End: 2019-09-24 | Stop reason: SDUPTHER

## 2019-08-20 NOTE — LETTER
NOTIFICATION RETURN TO WORK / SCHOOL 
 
8/20/2019 9:59 AM 
 
Ms. Momo Andrew [facility] [address] [address] To Whom It May Concern: 
 
Momo Andrew is currently under the care of LISA Joseph. She will return to work/school on: 8/21/19 If there are questions or concerns please have the patient contact our office. Sincerely, Margarito Hernandez MD

## 2019-08-20 NOTE — PATIENT INSTRUCTIONS
For your symptoms: Your symptoms may improve with an oral antihistamine. These are available over the counter and include:  Loratadine/claritin  Cetirizine/Zyrtec  Fexofenadine/Allegra  Levocetirizine/Xyzal    · Your symptoms may improve with a nasal steroid. These are available over the counter and include:  · Flonase (aka fluticasone)  · Nasocort (aka triamcinolone)  · Nasonex (aka mometasone)  · Rhinocort (aka budesonide)    · Increase fluid intake, especially water to thin mucous and boost the immune system. · Avoid sugar and dairy while congested since they thicken mucous. · Get plenty of rest!    · Gargle 3 times daily and as needed in Listerine or warm salt water vinegar solutions (1 tsp salt, 1 tsp vinegar in 1 cup lukewarm water.)    · Use OTC nasal saline spray up each nostril four times daily. You could also consider using a netipot with distilled water. · Use humidifier at bedtime. · Use OTC Mucinex 600 mg twice daily to loosen mucous. · Use OTC Tylenol (up to 650mg every 6 hours) or Ibuprofen (up to 800 mg every 8 hours) as needed for pain, fever or headaches. ·  Avoid decongestants and Ibuprofen if you have high blood pressure! Return to the doctor for evaluation:  · If mucous is consistently discolored yellow or green throughout the day for more than a week  · If you develop worsening facial pain  · If you develop a fever that will not go away  · If your symptoms worsen instead of improve           Viral Infections: Care Instructions  Your Care Instructions    You don't feel well, but it's not clear what's causing it. You may have a viral infection. Viruses cause many illnesses, such as the common cold, influenza, fever, rashes, and the diarrhea, nausea, and vomiting that are often called \"stomach flu. \" You may wonder if antibiotic medicines could make you feel better. But antibiotics only treat infections caused by bacteria. They don't work on viruses.   The good news is that viral infections usually aren't serious. Most will go away in a few days without medical treatment. In the meantime, there are a few things you can do to make yourself more comfortable. Follow-up care is a key part of your treatment and safety. Be sure to make and go to all appointments, and call your doctor if you are having problems. It's also a good idea to know your test results and keep a list of the medicines you take. How can you care for yourself at home? · Get plenty of rest if you feel tired. · Take an over-the-counter pain medicine if needed, such as acetaminophen (Tylenol), ibuprofen (Advil, Motrin), or naproxen (Aleve). Read and follow all instructions on the label. · Be careful when taking over-the-counter cold or flu medicines and Tylenol at the same time. Many of these medicines have acetaminophen, which is Tylenol. Read the labels to make sure that you are not taking more than the recommended dose. Too much acetaminophen (Tylenol) can be harmful. · Drink plenty of fluids, enough so that your urine is light yellow or clear like water. If you have kidney, heart, or liver disease and have to limit fluids, talk with your doctor before you increase the amount of fluids you drink. · Stay home from work, school, and other public places while you have a fever. When should you call for help? Call 911 anytime you think you may need emergency care. For example, call if:    · You have severe trouble breathing.     · You passed out (lost consciousness).    Call your doctor now or seek immediate medical care if:    · You seem to be getting much sicker.     · You have a new or higher fever.     · You have blood in your stools.     · You have new belly pain, or your pain gets worse.     · You have a new rash.    Watch closely for changes in your health, and be sure to contact your doctor if:    · You start to get better and then get worse.     · You do not get better as expected.    Where can you learn more?  Go to http://danika-sarah.info/. Enter G946 in the search box to learn more about \"Viral Infections: Care Instructions. \"  Current as of: July 30, 2018  Content Version: 12.1  © 5568-1600 [employer]. Care instructions adapted under license by [employer] (which disclaims liability or warranty for this information). If you have questions about a medical condition or this instruction, always ask your healthcare professional. [address] any warranty or liability for your use of this information.

## 2019-08-20 NOTE — PROGRESS NOTES
[facility] Note      Subjective:     Chief Complaint   Patient presents with    Flu     x 1 week, diagnosed at [facility]     Cristina Valdez is a 37y.o. year old female who presents for evaluation of the following:      Viral URI  Diagnosed with flu at [facility] 1 week ago  Had fever, runny nose, body aches, sweats, diarrhea  Asked if she is still contagious works as [employer]. Asks for work note  Treated with tamiflu, completed  - also taking Theraflu, motrin  Current symptoms: migraines, sore throat, coughing  Endorses anterior chest tenderness  Denies vomiting, shortness of breath    Asthma:   Coughing more than usual. No wheezing  Since childhood   No current inhaler, no refill in out system since 2017        Review of Systems   Pertinent positives and negative per HPI. All other systems  reviewed are negative for a Comprehensive ROS (10+). Past Medical History:   Diagnosis Date    Anemia     Asthma     Fall 2006    fall at work and injured back    Hypothyroidism     hypothyriod     Vitamin D deficiency         Social History     Socioeconomic History    Marital status: SINGLE     Spouse name: Not on file    Number of children: Not on file    Years of education: Not on file    Highest education level: Not on file   Occupational History    Not on file   Social Needs    Financial resource strain: Not on file    Food insecurity:     Worry: Not on file     Inability: Not on file    Transportation needs:     Medical: Not on file     Non-medical: Not on file   Tobacco Use    Smoking status: Never Smoker    Smokeless tobacco: Never Used   Substance and Sexual Activity    Alcohol use:  Yes     Alcohol/week: 0.0 standard drinks     Comment: rare    Drug use: No    Sexual activity: Never   Lifestyle    Physical activity:     Days per week: Not on file     Minutes per session: Not on file    Stress: Not on file   Relationships    Social connections: Talks on phone: Not on file     Gets together: Not on file     Attends [employer] service: Not on file     Active member of club or organization: Not on file     Attends meetings of clubs or organizations: Not on file     Relationship status: Not on file    Intimate partner violence:     Fear of current or ex partner: Not on file     Emotionally abused: Not on file     Physically abused: Not on file     Forced sexual activity: Not on file   Other Topics Concern    Not on file   Social History Narrative    Works in a detention with juveniles, very stressful job    Has autistic son with her    Older son lives in [place]       Family History   Problem Relation Age of Onset    Heart Disease Maternal Grandmother     Stroke Maternal Grandmother        Current Outpatient Medications   Medication Sig    butalbital-acetaminophen-caffeine (ESGIC) -40 mg per tablet Take 1 Tab by mouth every four (4) hours as needed for Pain. Max Daily Amount: 6 Tabs.  amitriptyline (ELAVIL) 25 mg tablet Take 1 Tab by mouth nightly.  promethazine (PHENERGAN) 25 mg tablet Take 1 Tab by mouth every six (6) hours as needed. No current facility-administered medications for this visit. Objective:     Vitals:    08/20/19 0921   BP: 166/87   Pulse: 85   Resp: 17   Temp: 98.3 °F (36.8 °C)   TempSrc: Oral   SpO2: 100%   Weight: 178 lb 3.2 oz (80.8 kg)   Height: 5' 9.5\" (1.765 m)       Physical Examination:  General: Alert, cooperative, no distress, appears stated age. Eyes: Conjunctivae clear. PERRL, EOMs intact. Ears: Normal external ear canals both ears. TM clear and mobile bilaterally  Nose: Nares normal. Septum midline. Mucosa normal. No drainage or sinus tenderness. Mouth/Throat: Lips, mucosa, and tongue normal. No oropharyngeal erythema. No tonsillar enlargement or exudate.   - forceful cough at onset of interview only  Neck: Supple, symmetrical, trachea midline, no adenopathy.  No thyroid enlargement/tenderness/nodules  Respiratory: Breathing comfortably, in no acute respiratory distress. Clear to auscultation bilaterally. Normal inspiratory and expiratory ratio. Cardiovascular: Regular rate and rhythm, S1, S2 normal, no murmur, click, rub or gallop.   -Extremities no edema. Pulses 2+ and symmetric radial  Abdomen: Soft, non-tender, not distended. Bowel sounds normal. No masses or organomegaly. MSK: Extremities normal, atraumatic, no effusion. Gait steady and unassisted. Skin: Skin color, texture, turgor normal. No rashes or lesions on exposed skin. Lymph nodes: Cervical, supraclavicular nodes normal.  Neurologic: CNII-XII intact. Strength 5/5 grossly. Sensation and reflexes normal throughout. Psychiatric: Affect appropriate. Mood euthymic. Thoughts logical. Speech volume and speed normal      Admission on 06/16/2019, Discharged on 06/16/2019   Component Date Value Ref Range Status    Ventricular Rate 06/16/2019 73  BPM Final    Atrial Rate 06/16/2019 73  BPM Final    P-R Interval 06/16/2019 134  ms Final    QRS Duration 06/16/2019 72  ms Final    Q-T Interval 06/16/2019 362  ms Final    QTC Calculation (Bezet) 06/16/2019 398  ms Final    Calculated P Axis 06/16/2019 37  degrees Final    Calculated R Axis 06/16/2019 39  degrees Final    Calculated T Axis 06/16/2019 31  degrees Final    Diagnosis 06/16/2019    Final                    Value:Normal sinus rhythm  Low voltage QRS  When compared with ECG of 01-JAN-2018 13:47,  No significant change was found  Confirmed by Lionel Saldana M.D., [place]agne ([id number]) on 6/16/2019 1:33:26 PM      SAMPLES BEING HELD 06/16/2019 1RED 1BLU 1PST 1LAV   Final    COMMENT 06/16/2019 Add-on orders for these samples will be processed based on acceptable specimen integrity and analyte stability, which may vary by analyte.     Final    WBC 06/16/2019 5.9  3.6 - 11.0 K/uL Final    RBC 06/16/2019 4.02  3.80 - 5.20 M/uL Final    HGB 06/16/2019 12.1  11.5 - 16.0 g/dL Final    HCT 06/16/2019 37.8  35.0 - 47.0 % Final    MCV 06/16/2019 94.0  80.0 - 99.0 FL Final    MCH 06/16/2019 30.1  26.0 - 34.0 PG Final    MCHC 06/16/2019 32.0  30.0 - 36.5 g/dL Final    RDW 06/16/2019 13.2  11.5 - 14.5 % Final    PLATELET 80/29/4022 772  150 - 400 K/uL Final    MPV 06/16/2019 10.2  8.9 - 12.9 FL Final    NRBC 06/16/2019 0.0  0  WBC Final    ABSOLUTE NRBC 06/16/2019 0.00  0.00 - 0.01 K/uL Final    NEUTROPHILS 06/16/2019 61  32 - 75 % Final    LYMPHOCYTES 06/16/2019 28  12 - 49 % Final    MONOCYTES 06/16/2019 9  5 - 13 % Final    EOSINOPHILS 06/16/2019 1  0 - 7 % Final    BASOPHILS 06/16/2019 1  0 - 1 % Final    IMMATURE GRANULOCYTES 06/16/2019 0  0.0 - 0.5 % Final    ABS. NEUTROPHILS 06/16/2019 3.6  1.8 - 8.0 K/UL Final    ABS. LYMPHOCYTES 06/16/2019 1.7  0.8 - 3.5 K/UL Final    ABS. MONOCYTES 06/16/2019 0.5  0.0 - 1.0 K/UL Final    ABS. EOSINOPHILS 06/16/2019 0.1  0.0 - 0.4 K/UL Final    ABS. BASOPHILS 06/16/2019 0.0  0.0 - 0.1 K/UL Final    ABS. IMM. GRANS. 06/16/2019 0.0  0.00 - 0.04 K/UL Final    DF 06/16/2019 AUTOMATED    Final    Sodium 06/16/2019 141  136 - 145 mmol/L Final    Potassium 06/16/2019 3.9  3.5 - 5.1 mmol/L Final    Chloride 06/16/2019 109* 97 - 108 mmol/L Final    CO2 06/16/2019 26  21 - 32 mmol/L Final    Anion gap 06/16/2019 6  5 - 15 mmol/L Final    Glucose 06/16/2019 94  65 - 100 mg/dL Final    BUN 06/16/2019 12  6 - 20 MG/DL Final    Creatinine 06/16/2019 0.89  0.55 - 1.02 MG/DL Final    BUN/Creatinine ratio 06/16/2019 13  12 - 20   Final    GFR est AA 06/16/2019 >60  >60 ml/min/1.73m2 Final    GFR est non-AA 06/16/2019 >60  >60 ml/min/1.73m2 Final    Comment: Estimated GFR is calculated using the IDMS-traceable Modification of Diet in Renal Disease (MDRD) Study equation, reported for both  Americans (GFRAA) and non- Americans (GFRNA), and normalized to 1.73m2 body surface area.  The physician must decide which value applies to the patient. The MDRD study equation should only be used in individuals age 25 or older. It has not been validated for the following: pregnant women, patients with serious comorbid conditions, or on certain medications, or persons with extremes of body size, muscle mass, or nutritional status.  Calcium 06/16/2019 9.1  8.5 - 10.1 MG/DL Final    Bilirubin, total 06/16/2019 0.5  0.2 - 1.0 MG/DL Final    ALT (SGPT) 06/16/2019 12  12 - 78 U/L Final    AST (SGOT) 06/16/2019 8* 15 - 37 U/L Final    Alk. phosphatase 06/16/2019 73  45 - 117 U/L Final    Protein, total 06/16/2019 7.1  6.4 - 8.2 g/dL Final    Albumin 06/16/2019 3.7  3.5 - 5.0 g/dL Final    Globulin 06/16/2019 3.4  2.0 - 4.0 g/dL Final    A-G Ratio 06/16/2019 1.1  1.1 - 2.2   Final    Lipase 06/16/2019 111  73 - 393 U/L Final    Troponin-I, Qt. 06/16/2019 <0.05  <0.05 ng/mL Final    Comment: The presence of detectable troponin above the reference range indicates myocardial injury which may be due to ischemia, myocarditis, trauma, etc.  Clinical correlation is necessary to establish the significance of this finding. Sequential testing is recommended to determine if the typical rise and fall of cTnI is demonstrated. Note:  Cardiac troponin I has a relatively long half life and may be present well after the CK MB has returned to baseline. The reference range is based on the 99th percentile of the referent population.  Pregnancy test,urine (POC) 06/16/2019 NEGATIVE   NEG   Final           Assessment/ Plan:   Diagnoses and all orders for this visit:    1. Viral URI with cough  -     ibuprofen (MOTRIN) 800 mg tablet; Take 1 Tab by mouth every eight (8) hours as needed for Pain. -     benzonatate (TESSALON) 100 mg capsule; Take 1-2 Caps by mouth two (2) times daily as needed for Cough for up to 10 days. 2. Body aches  -     ibuprofen (MOTRIN) 800 mg tablet; Take 1 Tab by mouth every eight (8) hours as needed for Pain.     3. Uncomplicated asthma, unspecified asthma severity, unspecified whether persistent      Resolved in viral URI with cough Trial benzonatate for cough and NSAID for body aches. No signs of asthma exacerbation. BP elevated on first check but resolved on recheck. Educated patient on red flag symptoms to warrant return to clinic or emergency room visit. I have discussed the diagnosis with the patient and the intended plan as seen in the above orders. The patient has been offered or received an after-visit summary and questions were answered concerning future plans. I have discussed medication side effects and warnings with the patient as well. Follow-up and Dispositions    · Return if symptoms worsen or fail to improve, for blood pressure.        Signed,    Kristine Flannery MD  8/20/2019

## 2019-08-20 NOTE — PROGRESS NOTES
Chief Complaint   Patient presents with    Flu     x 1 week, diagnosed at [facility]     1. Have you been to the ER, urgent care clinic since your last visit? Hospitalized since your last visit? yes [facility] last tuesday for the flu 08/13/2019    2. Have you seen or consulted any other health care providers outside of the [address] [place] [address] since your last visit? Include any pap smears or colon screening.  No

## 2019-09-24 ENCOUNTER — TELEPHONE (OUTPATIENT)
Dept: FAMILY MEDICINE CLINIC | Age: 44
End: 2019-09-24

## 2019-09-24 ENCOUNTER — OFFICE VISIT (OUTPATIENT)
Dept: FAMILY MEDICINE CLINIC | Age: 44
End: 2019-09-24

## 2019-09-24 ENCOUNTER — HOSPITAL ENCOUNTER (OUTPATIENT)
Dept: LAB | Age: 44
Discharge: HOME | End: 2019-09-24
Payer: COMMERCIAL

## 2019-09-24 VITALS
HEIGHT: 70 IN | WEIGHT: 177.6 LBS | SYSTOLIC BLOOD PRESSURE: 128 MMHG | BODY MASS INDEX: 25.43 KG/M2 | OXYGEN SATURATION: 99 % | DIASTOLIC BLOOD PRESSURE: 89 MMHG | RESPIRATION RATE: 18 BRPM | HEART RATE: 81 BPM | TEMPERATURE: 99.2 F

## 2019-09-24 DIAGNOSIS — G43.009 MIGRAINE WITHOUT AURA AND WITHOUT STATUS MIGRAINOSUS, NOT INTRACTABLE: ICD-10-CM

## 2019-09-24 DIAGNOSIS — J06.9 VIRAL URI WITH COUGH: ICD-10-CM

## 2019-09-24 DIAGNOSIS — Z12.4 CERVICAL CANCER SCREENING: ICD-10-CM

## 2019-09-24 DIAGNOSIS — Z00.00 ENCOUNTER FOR PREVENTIVE CARE: Primary | ICD-10-CM

## 2019-09-24 DIAGNOSIS — E66.3 OVERWEIGHT (BMI 25.0-29.9): ICD-10-CM

## 2019-09-24 DIAGNOSIS — Z23 ENCOUNTER FOR IMMUNIZATION: ICD-10-CM

## 2019-09-24 DIAGNOSIS — K64.4 EXTERNAL HEMORRHOID, BLEEDING: ICD-10-CM

## 2019-09-24 DIAGNOSIS — R52 BODY ACHES: ICD-10-CM

## 2019-09-24 DIAGNOSIS — R51.9 NONINTRACTABLE HEADACHE, UNSPECIFIED CHRONICITY PATTERN, UNSPECIFIED HEADACHE TYPE: ICD-10-CM

## 2019-09-24 DIAGNOSIS — K59.04 CHRONIC IDIOPATHIC CONSTIPATION: ICD-10-CM

## 2019-09-24 DIAGNOSIS — J45.20 MILD INTERMITTENT ASTHMA WITHOUT COMPLICATION: ICD-10-CM

## 2019-09-24 DIAGNOSIS — K62.5 RECTAL BLEEDING: ICD-10-CM

## 2019-09-24 PROCEDURE — 88175 CYTOPATH C/V AUTO FLUID REDO: CPT

## 2019-09-24 PROCEDURE — 87624 HPV HI-RISK TYP POOLED RSLT: CPT

## 2019-09-24 RX ORDER — IBUPROFEN 800 MG/1
800 TABLET ORAL
Qty: 90 TAB | Refills: 0 | Status: SHIPPED | OUTPATIENT
Start: 2019-09-24 | End: 2020-11-20

## 2019-09-24 RX ORDER — BUTALBITAL, ACETAMINOPHEN AND CAFFEINE 50; 325; 40 MG/1; MG/1; MG/1
1 TABLET ORAL
Qty: 12 TAB | Refills: 0 | Status: SHIPPED | OUTPATIENT
Start: 2019-09-24 | End: 2020-11-20

## 2019-09-24 RX ORDER — PROMETHAZINE HYDROCHLORIDE 25 MG/1
25 TABLET ORAL
Qty: 12 TAB | Refills: 0 | Status: SHIPPED | OUTPATIENT
Start: 2019-09-24 | End: 2020-11-20

## 2019-09-24 NOTE — PATIENT INSTRUCTIONS
Start miralax 1/4 scoop per day Give that a week, if not soft stools every 2 days, go up to 1/2 scoop Eventually get up to 1 scoop per day Well Visit, Ages 25 to 48: Care Instructions Your Care Instructions Physical exams can help you stay healthy. Your doctor has checked your overall health and may have suggested ways to take good care of yourself. He or she also may have recommended tests. At home, you can help prevent illness with healthy eating, regular exercise, and other steps. Follow-up care is a key part of your treatment and safety. Be sure to make and go to all appointments, and call your doctor if you are having problems. It's also a good idea to know your test results and keep a list of the medicines you take. How can you care for yourself at home? · Reach and stay at a healthy weight. This will lower your risk for many problems, such as obesity, diabetes, heart disease, and high blood pressure. · Get at least 30 minutes of physical activity on most days of the week. Walking is a good choice. You also may want to do other activities, such as running, swimming, cycling, or playing tennis or team sports. Discuss any changes in your exercise program with your doctor. · Do not smoke or allow others to smoke around you. If you need help quitting, talk to your doctor about stop-smoking programs and medicines. These can increase your chances of quitting for good. · Talk to your doctor about whether you have any risk factors for sexually transmitted infections (STIs). Having one sex partner (who does not have STIs and does not have sex with anyone else) is a good way to avoid these infections. · Use birth control if you do not want to have children at this time. Talk with your doctor about the choices available and what might be best for you. · Protect your skin from too much sun.  When you're outdoors from 10 a.m. to 4 p.m., stay in the shade or cover up with clothing and a hat with a wide brim. Wear sunglasses that block UV rays. Even when it's cloudy, put broad-spectrum sunscreen (SPF 30 or higher) on any exposed skin. · See a dentist one or two times a year for checkups and to have your teeth cleaned. · Wear a seat belt in the car. Follow your doctor's advice about when to have certain tests. These tests can spot problems early. For everyone · Cholesterol. Have the fat (cholesterol) in your blood tested after age 21. Your doctor will tell you how often to have this done based on your age, family history, or other things that can increase your risk for heart disease. · Blood pressure. Have your blood pressure checked during a routine doctor visit. Your doctor will tell you how often to check your blood pressure based on your age, your blood pressure results, and other factors. · Vision. Talk with your doctor about how often to have a glaucoma test. 
· Diabetes. Ask your doctor whether you should have tests for diabetes. · Colon cancer. Your risk for colorectal cancer gets higher as you get older. Some experts say that adults should start regular screening at age 48 and stop at age 76. Others say to start before age 48 or continue after age 76. Talk with your doctor about your risk and when to start and stop screening. For women · Breast exam and mammogram. Talk to your doctor about when you should have a clinical breast exam and a mammogram. Medical experts differ on whether and how often women under 50 should have these tests. Your doctor can help you decide what is right for you. · Cervical cancer screening test and pelvic exam. Begin with a Pap test at age 24. The test often is part of a pelvic exam. Starting at age 27, you may choose to have a Pap test, an HPV test, or both tests at the same time (called co-testing). Talk with your doctor about how often to have testing. · Tests for sexually transmitted infections (STIs).  Ask whether you should have tests for STIs. You may be at risk if you have sex with more than one person, especially if your partners do not wear condoms. For men · Tests for sexually transmitted infections (STIs). Ask whether you should have tests for STIs. You may be at risk if you have sex with more than one person, especially if you do not wear a condom. · Testicular cancer exam. Ask your doctor whether you should check your testicles regularly. · Prostate exam. Talk to your doctor about whether you should have a blood test (called a PSA test) for prostate cancer. Experts differ on whether and when men should have this test. Some experts suggest it if you are older than 39 and are -American or have a father or brother who got prostate cancer when he was younger than 72. When should you call for help? Watch closely for changes in your health, and be sure to contact your doctor if you have any problems or symptoms that concern you. Where can you learn more? Go to http://danika-sarah.info/. Enter P072 in the search box to learn more about \"Well Visit, Ages 25 to 48: Care Instructions. \" Current as of: December 13, 2018 Content Version: 12.2 © 3661-9312 [employer]. Care instructions adapted under license by [employer] (which disclaims liability or warranty for this information). If you have questions about a medical condition or this instruction, always ask your healthcare professional. [address] any warranty or liability for your use of this information.

## 2019-09-24 NOTE — PROGRESS NOTES
[facility]  Brandan Robles. [facility], [address]  [phone]             Date of visit: 9/24/2019   Subjective:      History obtained from:  the patient. Jareth Gomez is a 37 y.o. female who presents today for physical, due for pap    Had red rectal bleeding 2 times this week, filled toilet bowl  Her hemorrhoids looked larger  Hemorrhoids don't usually bother her, no pain or itching  Hard stools only every 3-4 days    Not periods on depo    Moving to [place] soon  Trying to sell house first  Probably won't move until the winter or spring    2 vaginal deliveries in the past    No migraines in a long time    Due for pap    Some pains in lower abd for 1.5 weeks    Has lost almost 20lb since I saw her 2 years ago  Not much exercise  Eating better she thinks    Getting depo shots at health clinic    Patient Active Problem List    Diagnosis Date Noted    Adult situational stress disorder 01/10/2018    Irregular menstrual cycle 01/10/2018    Muscle cramps 01/09/2018    Migraine without aura and without status migrainosus, not intractable 01/09/2018    History of hypothyroidism 10/13/2015    Allergic rhinitis due to allergen 10/13/2015    Asthma 09/24/2014    Depression 09/24/2013     Current Outpatient Medications   Medication Sig Dispense Refill    ibuprofen (MOTRIN) 800 mg tablet Take 1 Tab by mouth every eight (8) hours as needed for Pain. 90 Tab 0    butalbital-acetaminophen-caffeine (ESGIC) -40 mg per tablet Take 1 Tab by mouth every four (4) hours as needed for Pain. 12 Tab 0    promethazine (PHENERGAN) 25 mg tablet Take 1 Tab by mouth every six (6) hours as needed (migraine or nausea).  12 Tab 0     Allergies   Allergen Reactions    Latex Swelling    Latex Rash    Onion Anaphylaxis    Oxycodone Anaphylaxis and Shortness of Breath    Tomato Anaphylaxis    Egg Rash    Hydrocodone Shortness of Breath and Itching    Mushroom Rash    Shellfish Derived Swelling     Past Medical History:   Diagnosis Date    Anemia     Asthma     Fall 2006    fall at work and injured back    Hypothyroidism     hypothyriod     Vitamin D deficiency      Past Surgical History:   Procedure Laterality Date    HX HERNIA REPAIR  10/07/2016    open ventral hernia repair by Dr. Hung Simmons. 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: denies fever   denies dysuria usually, did have it once     Objective:     Vitals:    09/24/19 1353   BP: 128/89   Pulse: 81   Resp: 18   Temp: 99.2 °F (37.3 °C)   TempSrc: Oral   SpO2: 99%   Weight: 177 lb 9.6 oz (80.6 kg)   Height: 5' 9.5\" (1.765 m)     Body mass index is 25.85 kg/m².      General: stated age, well-developed, and in NAD  Eyes: PERRL, EOMI, no redness or drainage  Nose: no drainage  Mouth: no lesions  Throat: no erythema, exudate or swelling  Neck: supple, symmetrical, trachea midline, no adenopathy and thyroid: not enlarged, symmetric, no tenderness/mass/nodules  Lungs:  clear to auscultation w/o rales, rhonchi, wheezes w/normal effort and no use of accessory muscles of respiration   Heart: regular rate and rhythm, S1, S2 normal, no murmur, click, rub or gallop  Abdomen: soft, nontender, no masses  : normal female, cervix appears normal, little yellow vaginal discharge, minimal, no cervixal or adnexal enlargement or tenderness  Rect: small external hemorrhoidal tag, nothing that looks like it has been bleeding  Ext:  No edema noted. Lymph: no cervical adenopathy appreciated  Skin:  Normal. and no rash or abnormalities   Neuro: normal gait, CN 2-12 intact  Psych: alert and oriented to person, place, time and situation and Speech: appropriate quality, quantity and organization of sentences and normal affect    Assessment/Plan:       ICD-10-CM ICD-9-CM    1. Encounter for preventive care Z00.00 V70.0 CBC WITH AUTOMATED DIFF      METABOLIC PANEL, COMPREHENSIVE   2. Migraine without aura and without status migrainosus, not intractable G43.009 346.10    3. Mild intermittent asthma without complication F16.85 711.71    4. Encounter for immunization Z23 V03.89    5. Viral URI with cough J06.9 465.9 ibuprofen (MOTRIN) 800 mg tablet    B97.89     6. Body aches R52 780.96 ibuprofen (MOTRIN) 800 mg tablet   7. Nonintractable headache, unspecified chronicity pattern, unspecified headache type R51 784.0 butalbital-acetaminophen-caffeine (ESGIC) -40 mg per tablet   8. Cervical cancer screening Z12.4 V76.2 PAP IG, APTIMA HPV AND RFX 16/18,45 ([id number])   9. Overweight (BMI 25.0-29. 9) E66.3 278.02    10. Chronic idiopathic constipation K59.04 564.00    11. External hemorrhoid, bleeding K64.4 455.5    12.  Rectal bleeding K62.5 569.3 CBC WITH AUTOMATED DIFF      METABOLIC PANEL, COMPREHENSIVE      REFERRAL TO GASTROENTEROLOGY        Orders Placed This Encounter    CBC WITH AUTOMATED DIFF    METABOLIC PANEL, COMPREHENSIVE    [facility]    ibuprofen (MOTRIN) 800 mg tablet    butalbital-acetaminophen-caffeine (ESGIC) -40 mg per tablet    promethazine (PHENERGAN) 25 mg tablet    PAP IG, APTIMA HPV AND RFX 16/18,45 ([id number])       Declines vaccines  Preventive otherwise up to date after pap today  Has done well with weight loss  Encouraged continued healthy eating  'needs more regular exercise  Advised miralax to control constipation, keep hemorrhoids from worsening  The large rectal bleeding is concerning, though, refer colonoscopy urgently  Reviewed worrisome signs or symptoms for which to call or go to ER. Discussed the diagnosis and plan and she expressed understanding. Follow-up and Dispositions    · Return if symptoms worsen or fail to improve.          Lisa Rajput MD

## 2019-09-24 NOTE — TELEPHONE ENCOUNTER
[facility] is calling stating that Her gastro  the Dr Massiel Miramontes is requesting her notes from today and labs be fax to them .   Her apt is on Oct 1st at 12:45pm  Fax# [phone]  BCB# [phone]

## 2019-09-24 NOTE — LETTER
9/24/2019 2:45 PM 
 
RE:    Cristina Guerra [address] [id number] Dear Dr. Lisha Leahy, Thank you for agreeing to see Mario Leslie I am referring my patient to you for evaluation of large amount of rectal bleeding 2x this week, cbc pending but she is not having anemia symptoms. She thinks it was a hemorrhoid, I don't see much on external exam and advised to just get a colonoscopy as she is 37. I was hoping you could get her in quickly due to the large amount of bleeding (turning toilet water red 2x, mixed in with stool) I appreciate your assistance in Ms. Judith Timmons care  and look forward to your findings and recommendations. Sincerely, Shahram Davis MD

## 2019-09-24 NOTE — PROGRESS NOTES
Chief Complaint   Patient presents with    Complete Physical    Anal Bleeding     bright red blood with bowel movements x2 days patient believes she may have heorrhoids, but has not had bleeding before        1. Have you been to the ER, urgent care clinic since your last visit? Hospitalized since your last visit? No    2. Have you seen or consulted any other health care providers outside of the [address] [place] [address] since your last visit? Include any pap smears or colon screening.  No

## 2019-09-25 LAB
ALBUMIN SERPL-MCNC: 4.4 G/DL (ref 3.5–5.5)
ALBUMIN/GLOB SERPL: 1.8 {RATIO} (ref 1.2–2.2)
ALP SERPL-CCNC: 66 IU/L (ref 39–117)
ALT SERPL-CCNC: 7 IU/L (ref 0–32)
AST SERPL-CCNC: 10 IU/L (ref 0–40)
BASOPHILS # BLD AUTO: 0 X10E3/UL (ref 0–0.2)
BASOPHILS NFR BLD AUTO: 0 %
BILIRUB SERPL-MCNC: 0.7 MG/DL (ref 0–1.2)
BUN SERPL-MCNC: 11 MG/DL (ref 6–24)
BUN/CREAT SERPL: 13 (ref 9–23)
CALCIUM SERPL-MCNC: 9.2 MG/DL (ref 8.7–10.2)
CHLORIDE SERPL-SCNC: 105 MMOL/L (ref 96–106)
CO2 SERPL-SCNC: 22 MMOL/L (ref 20–29)
CREAT SERPL-MCNC: 0.83 MG/DL (ref 0.57–1)
EOSINOPHIL # BLD AUTO: 0 X10E3/UL (ref 0–0.4)
EOSINOPHIL NFR BLD AUTO: 1 %
ERYTHROCYTE [DISTWIDTH] IN BLOOD BY AUTOMATED COUNT: 13.2 % (ref 12.3–15.4)
GLOBULIN SER CALC-MCNC: 2.5 G/DL (ref 1.5–4.5)
GLUCOSE SERPL-MCNC: 78 MG/DL (ref 65–99)
HCT VFR BLD AUTO: 37.5 % (ref 34–46.6)
HGB BLD-MCNC: 12.7 G/DL (ref 11.1–15.9)
IMM GRANULOCYTES # BLD AUTO: 0 X10E3/UL (ref 0–0.1)
IMM GRANULOCYTES NFR BLD AUTO: 0 %
LYMPHOCYTES # BLD AUTO: 1.7 X10E3/UL (ref 0.7–3.1)
LYMPHOCYTES NFR BLD AUTO: 30 %
MCH RBC QN AUTO: 30.1 PG (ref 26.6–33)
MCHC RBC AUTO-ENTMCNC: 33.9 G/DL (ref 31.5–35.7)
MCV RBC AUTO: 89 FL (ref 79–97)
MONOCYTES # BLD AUTO: 0.4 X10E3/UL (ref 0.1–0.9)
MONOCYTES NFR BLD AUTO: 8 %
NEUTROPHILS # BLD AUTO: 3.5 X10E3/UL (ref 1.4–7)
NEUTROPHILS NFR BLD AUTO: 61 %
PLATELET # BLD AUTO: 237 X10E3/UL (ref 150–450)
POTASSIUM SERPL-SCNC: 4 MMOL/L (ref 3.5–5.2)
PROT SERPL-MCNC: 6.9 G/DL (ref 6–8.5)
RBC # BLD AUTO: 4.22 X10E6/UL (ref 3.77–5.28)
SODIUM SERPL-SCNC: 142 MMOL/L (ref 134–144)
WBC # BLD AUTO: 5.6 X10E3/UL (ref 3.4–10.8)

## 2020-02-07 ENCOUNTER — APPOINTMENT (OUTPATIENT)
Dept: GENERAL RADIOLOGY | Age: 45
End: 2020-02-07
Attending: EMERGENCY MEDICINE
Payer: COMMERCIAL

## 2020-02-07 ENCOUNTER — HOSPITAL ENCOUNTER (EMERGENCY)
Age: 45
Discharge: HOME | End: 2020-02-08
Attending: EMERGENCY MEDICINE | Admitting: EMERGENCY MEDICINE
Payer: COMMERCIAL

## 2020-02-07 VITALS
OXYGEN SATURATION: 97 % | HEART RATE: 73 BPM | BODY MASS INDEX: 26.83 KG/M2 | TEMPERATURE: 98 F | HEIGHT: 70 IN | WEIGHT: 187.39 LBS | DIASTOLIC BLOOD PRESSURE: 83 MMHG | RESPIRATION RATE: 14 BRPM | SYSTOLIC BLOOD PRESSURE: 120 MMHG

## 2020-02-07 DIAGNOSIS — S99.921A INJURY OF TOE ON RIGHT FOOT, INITIAL ENCOUNTER: Primary | ICD-10-CM

## 2020-02-07 PROCEDURE — 74011250636 HC RX REV CODE- 250/636: Performed by: NURSE PRACTITIONER

## 2020-02-07 PROCEDURE — 99282 EMERGENCY DEPT VISIT SF MDM: CPT

## 2020-02-07 PROCEDURE — 73630 X-RAY EXAM OF FOOT: CPT

## 2020-02-07 PROCEDURE — 96372 THER/PROPH/DIAG INJ SC/IM: CPT

## 2020-02-07 RX ORDER — IBUPROFEN 800 MG/1
800 TABLET ORAL
Qty: 20 TAB | Refills: 0 | Status: SHIPPED | OUTPATIENT
Start: 2020-02-07 | End: 2020-02-14

## 2020-02-07 RX ORDER — KETOROLAC TROMETHAMINE 30 MG/ML
30 INJECTION, SOLUTION INTRAMUSCULAR; INTRAVENOUS
Status: COMPLETED | OUTPATIENT
Start: 2020-02-07 | End: 2020-02-07

## 2020-02-07 RX ADMIN — KETOROLAC TROMETHAMINE 30 MG: 30 INJECTION, SOLUTION INTRAMUSCULAR at 22:41

## 2020-02-07 NOTE — LETTER
ADULT WORK/SCHOOL NOTE Beka Guerra [address] [id number] 
 
 
2/7/2020 To whom it may concern, Please be advised that Beka Victor was evaluated and discharged from the Emergency Department on 2/7/2020. Beka Victor is excused from work/school now through 02/09/2020 Beka Victor may return to work with the following restrictions: 
 
 none Should Beka Victor require more time off or further clearance, she should follow up with her own regular physician or specialist. 
 
 
Sincerely, ITZEL Tipton Ace

## 2020-02-08 NOTE — ED PROVIDER NOTES
EMERGENCY DEPARTMENT HISTORY AND PHYSICAL EXAM      Date: 2/7/2020  Patient Name: Hood Lopez    History of Presenting Illness     Chief Complaint   Patient presents with    Toe Pain     Pt reports closet door fell on her R foot, brusing to 4th and 5th digits. History Provided By: Patient    HPI: Hood Lopez, 40 y.o. female presents by POV to the ED with cc of right foot pain. Patient states a door closed on her foot. Patient states she came directly after the accident, did not take medications to relieve pain medications prior to arrival.  Patient does not have any other injuries reporting. There are no other complaints, changes, or physical findings at this time. PCP: Vin Nathan MD    No current facility-administered medications on file prior to encounter. Current Outpatient Medications on File Prior to Encounter   Medication Sig Dispense Refill    ibuprofen (MOTRIN) 800 mg tablet Take 1 Tab by mouth every eight (8) hours as needed for Pain. 90 Tab 0    butalbital-acetaminophen-caffeine (ESGIC) -40 mg per tablet Take 1 Tab by mouth every four (4) hours as needed for Pain. 12 Tab 0    promethazine (PHENERGAN) 25 mg tablet Take 1 Tab by mouth every six (6) hours as needed (migraine or nausea). 12 Tab 0       Past History     Past Medical History:  Past Medical History:   Diagnosis Date    Anemia     Asthma     Fall 2006    fall at work and injured back    Hypothyroidism     hypothyriod     Vitamin D deficiency        Past Surgical History:  Past Surgical History:   Procedure Laterality Date    HX HERNIA REPAIR  10/07/2016    open ventral hernia repair by Dr. Jorge L Holland.     HX HERNIA REPAIR  10/07/2016    open ventral hernia repair by Dr Nicholas Adams Right 1/2015    had to rebrake because u=it healed wrong       Family History:  Family History   Problem Relation Age of Onset    Heart Disease Maternal Grandmother     Stroke Maternal Grandmother Social History:  Social History     Tobacco Use    Smoking status: Never Smoker    Smokeless tobacco: Never Used   Substance Use Topics    Alcohol use: Yes     Alcohol/week: 0.0 standard drinks     Comment: rare    Drug use: No       Allergies: Allergies   Allergen Reactions    Latex Swelling    Latex Rash    Onion Anaphylaxis    Oxycodone Anaphylaxis and Shortness of Breath    Tomato Anaphylaxis    Egg Rash    Hydrocodone Shortness of Breath and Itching    Mushroom Rash    Shellfish Derived Swelling         Review of Systems   Review of Systems   Constitutional: Negative for chills and fever. HENT: Negative for congestion, rhinorrhea and sore throat. Respiratory: Negative for cough and shortness of breath. Cardiovascular: Negative for chest pain. Gastrointestinal: Negative for abdominal pain, nausea and vomiting. Endocrine: Negative for polyuria. Genitourinary: Negative for dysuria and frequency. Musculoskeletal: Positive for arthralgias and joint swelling. Skin: Negative for rash. Neurological: Negative for dizziness, weakness and headaches. All other systems reviewed and are negative. Physical Exam   Physical Exam  Vitals signs and nursing note reviewed. Constitutional:       General: She is not in acute distress. Appearance: She is well-developed. She is not diaphoretic. Comments: 40 y.o. female   HENT:      Head: Normocephalic and atraumatic. Nose: Nose normal.      Mouth/Throat:      Mouth: Mucous membranes are moist.      Pharynx: Oropharynx is clear. Eyes:      Conjunctiva/sclera: Conjunctivae normal.      Pupils: Pupils are equal, round, and reactive to light. Neck:      Musculoskeletal: Normal range of motion. Cardiovascular:      Rate and Rhythm: Normal rate and regular rhythm. Heart sounds: Normal heart sounds. No murmur. Pulmonary:      Effort: Pulmonary effort is normal. No respiratory distress.       Breath sounds: Normal breath sounds. No wheezing. Musculoskeletal: Normal range of motion. General: Tenderness present. Right foot: Tenderness and swelling present. Skin:     General: Skin is warm and dry. Capillary Refill: Capillary refill takes less than 2 seconds. Neurological:      General: No focal deficit present. Mental Status: She is alert and oriented to person, place, and time. Mental status is at baseline. Psychiatric:         Mood and Affect: Mood normal.         Behavior: Behavior normal.         Diagnostic Study Results     Labs -   No results found for this or any previous visit (from the past 12 hour(s)). Radiologic Studies -   XR FOOT RT MIN 3 V   Final Result   IMPRESSION: No acute abnormality. Medical Decision Making   I am the first provider for this patient. I reviewed the vital signs, available nursing notes, past medical history, past surgical history, family history and social history. Vital Signs-Reviewed the patient's vital signs. Patient Vitals for the past 12 hrs:   Temp Pulse Resp BP SpO2   02/07/20 2222 98 °F (36.7 °C) 73 14 120/83 97 %       Records Reviewed: Nursing Notes and Old Medical Records    Provider Notes (Medical Decision Making):   Differential diagnosis include foot fracture, contusion, hematoma, dislocation. ED Course:   Initial assessment performed. The patients presenting problems have been discussed, and they are in agreement with the care plan formulated and outlined with them. I have encouraged them to ask questions as they arise throughout their visit. Discussed negative x-rays of the right foot. Patient can take over-the-counter ibuprofen or prescription strength 800 mg ibuprofen and follow-up with her primary care provider in 3 to 5 days. She may come to back to the emergency department if symptoms worsen. Critical Care Time: None    Disposition:  DISCHARGE NOTE:  11:15 PM  The pt is ready for discharge.  The pt's signs, symptoms, diagnosis, and discharge instructions have been discussed and pt has conveyed their understanding. The pt is to follow up as recommended or return to ER should their symptoms worsen. Plan has been discussed and pt is in agreement. Kristy Ledbetter NP  2/7/2020      PLAN:  1. Current Discharge Medication List      START taking these medications    Details   !! ibuprofen (MOTRIN) 800 mg tablet Take 1 Tab by mouth every six (6) hours as needed for Pain for up to 7 days. Qty: 20 Tab, Refills: 0       !! - Potential duplicate medications found. Please discuss with provider. CONTINUE these medications which have NOT CHANGED    Details   !! ibuprofen (MOTRIN) 800 mg tablet Take 1 Tab by mouth every eight (8) hours as needed for Pain. Qty: 90 Tab, Refills: 0    Associated Diagnoses: Viral URI with cough; Body aches      butalbital-acetaminophen-caffeine (ESGIC) -40 mg per tablet Take 1 Tab by mouth every four (4) hours as needed for Pain. Qty: 12 Tab, Refills: 0    Associated Diagnoses: Nonintractable headache, unspecified chronicity pattern, unspecified headache type      promethazine (PHENERGAN) 25 mg tablet Take 1 Tab by mouth every six (6) hours as needed (migraine or nausea). Qty: 12 Tab, Refills: 0       !! - Potential duplicate medications found. Please discuss with provider. 2.   Follow-up Information     Follow up With Specialties Details Why Contact Info    [facility] Emergency Medicine Go in 1 week As needed, If symptoms worsen [address] [place] [address]  [phone]    Catalina iYp MD Family Practice Schedule an appointment as soon as possible for a visit in 3 days As needed, If symptoms worsen [address]  [phone]          Return to ED if worse     Diagnosis     Clinical Impression:   1.  Injury of toe on right foot, initial encounter          Please note that this dictation was completed with leon[employer] computer voice recognition software. Quite often unanticipated grammatical, syntax, homophones, and other interpretive errors are inadvertently transcribed by the computer software. Please disregards these errors. Please excuse any errors that have escaped final proofreading. This note will not be viewable in [address]

## 2020-02-08 NOTE — ED NOTES
Patient discharge by Blanca Caller  - pt sent to the front lobby, with strong and steady gait -  Discharge information / home RX / and reasons to return to the ED were reviewed by the doctor.

## 2020-02-08 NOTE — DISCHARGE INSTRUCTIONS
Patient Education        Ankle: Exercises  Introduction  Here are some examples of exercises for you to try. The exercises may be suggested for a condition or for rehabilitation. Start each exercise slowly. Ease off the exercises if you start to have pain. You will be told when to start these exercises and which ones will work best for you. Alphabet exercise  \"Alphabet\" exercise    1. Trace the alphabet with your toe. This helps your ankle move in all directions. Side-to-side knee swing exercise    1. Sit in a chair with your foot flat on the floor. 2. Slowly move your knee from side to side while keeping your foot pressed flat. 3. Continue this exercise for 2 to 3 minutes. Towel curl    1. While sitting, place your foot on a towel on the floor and scrunch the towel toward you with your toes. 2. Then use your toes to push the towel away from you. 3. Make this exercise more challenging by placing a weighted object, such as a soup can, on the other end of the towel. Towel stretch    1. Sit with your legs extended and knees straight. 2. Place a towel around your foot just under the toes. 3. Hold each end of the towel in each hand, with your hands above your knees. 4. Pull back with the towel so that your foot stretches toward you. 5. Hold the position for at least 15 to 30 seconds. 6. Repeat 2 to 4 times a session, up to 5 sessions a day. Ankle eversion exercise    1. Start by sitting with your foot flat on the floor and pushing it outward against an immovable object such as the wall or heavy furniture. Hold for about 6 seconds, then relax. Repeat 8 to 12 times. 2. After you feel comfortable with this, try using rubber tubing looped around the outside of your feet for resistance. Push your foot out to the side against the tubing, and then count to 10 as you slowly bring your foot back to the middle. Repeat 8 to 12 times. Isometric opposition exercises    1.  While sitting, put your feet together flat on the floor. 2. Press your injured foot inward against your other foot. Hold for about 6 seconds, and relax. Repeat 8 to 12 times. 3. Then place the heel of your other foot on top of the injured one. Push down with the top heel while trying to push up with your injured foot. Hold for about 6 seconds, and relax. Repeat 8 to 12 times. Follow-up care is a key part of your treatment and safety. Be sure to make and go to all appointments, and call your doctor if you are having problems. It's also a good idea to know your test results and keep a list of the medicines you take. Where can you learn more? Go to http://danika-sarah.info/. Enter V512 in the search box to learn more about \"Ankle: Exercises. \"  Current as of: June 26, 2019  Content Version: 12.2  © 1419-5674 [employer]. Care instructions adapted under license by [employer] (which disclaims liability or warranty for this information). If you have questions about a medical condition or this instruction, always ask your healthcare professional. [address] any warranty or liability for your use of this information.

## 2020-03-30 ENCOUNTER — TELEPHONE (OUTPATIENT)
Dept: FAMILY MEDICINE CLINIC | Age: 45
End: 2020-03-30

## 2020-03-30 NOTE — TELEPHONE ENCOUNTER
----- Message from Rhesa Kayser sent at 3/30/2020 10:17 AM EDT -----  Regarding: Dr. Yesenia Munoz: [phone]  Medication Refill    Caller (if not patient): Pt    Relationship of caller (if not patient): Self     Best contact number(s): [phone]    Name of medication and dosage if known: Antibiotic & inhaler     Is patient out of this medication (yes/no): Yes     Pharmacy name: [facility] [address] listed in chart? (yes/no): Yes  Pharmacy phone number: \"\"    Details to clarify the request: Pt advised she is experiencing stomach pain along with vaginal discharge (started white, now yellow w/ odor; no burning). Pt stated she isn't sure if it is a yeast infection or something else (possibly BV? Pt says she had before). Symptoms ongoing for about 2 weeks. No other symptoms. Pt also needs refill of inhaler, notes that hers is .        Rhesa Kayser

## 2020-03-31 ENCOUNTER — OFFICE VISIT (OUTPATIENT)
Dept: FAMILY MEDICINE CLINIC | Age: 45
End: 2020-03-31

## 2020-03-31 VITALS
SYSTOLIC BLOOD PRESSURE: 120 MMHG | HEIGHT: 70 IN | OXYGEN SATURATION: 99 % | DIASTOLIC BLOOD PRESSURE: 76 MMHG | TEMPERATURE: 98.5 F | RESPIRATION RATE: 16 BRPM | HEART RATE: 85 BPM | BODY MASS INDEX: 27.06 KG/M2 | WEIGHT: 189 LBS

## 2020-03-31 DIAGNOSIS — R10.84 GENERALIZED ABDOMINAL PAIN: ICD-10-CM

## 2020-03-31 DIAGNOSIS — F43.20 ADULT SITUATIONAL STRESS DISORDER: ICD-10-CM

## 2020-03-31 DIAGNOSIS — N76.0 ACUTE VAGINITIS: Primary | ICD-10-CM

## 2020-03-31 DIAGNOSIS — J45.20 MILD INTERMITTENT ASTHMA WITHOUT COMPLICATION: ICD-10-CM

## 2020-03-31 DIAGNOSIS — Z20.2 STD EXPOSURE: ICD-10-CM

## 2020-03-31 RX ORDER — METRONIDAZOLE 500 MG/1
500 TABLET ORAL 2 TIMES DAILY
Qty: 14 TAB | Refills: 0 | Status: SHIPPED | OUTPATIENT
Start: 2020-03-31 | End: 2020-04-07

## 2020-03-31 RX ORDER — ALBUTEROL SULFATE 90 UG/1
2 AEROSOL, METERED RESPIRATORY (INHALATION)
Qty: 1 INHALER | Refills: 3 | Status: SHIPPED | OUTPATIENT
Start: 2020-03-31 | End: 2020-11-27 | Stop reason: SDUPTHER

## 2020-03-31 RX ORDER — ALBUTEROL SULFATE 0.83 MG/ML
2.5 SOLUTION RESPIRATORY (INHALATION)
Qty: 25 EACH | Refills: 3 | Status: SHIPPED | OUTPATIENT
Start: 2020-03-31 | End: 2020-10-29 | Stop reason: SDUPTHER

## 2020-03-31 RX ORDER — AMITRIPTYLINE HYDROCHLORIDE 25 MG/1
25 TABLET, FILM COATED ORAL
Qty: 30 TAB | Refills: 5 | Status: SHIPPED | OUTPATIENT
Start: 2020-03-31 | End: 2020-11-20

## 2020-03-31 NOTE — PATIENT INSTRUCTIONS

## 2020-03-31 NOTE — PROGRESS NOTES
[facility]  [address]. [facility], [address]  [phone]             Date of visit: 3/31/2020   Subjective:      History obtained from:  the patient. Sammie Tavarez is a 40 y.o. 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 and no PPE given    Has not worked since Sunday  Stomach bothering her  Having headaches  Feeling overwhelmed, anxious  Feeling some anxious  Was coughing the other day due to asthma (can tell by how it feels)  Doesn't have inhaler but usually doesn't need  Has nebulizer but needs med to go in it      Patient Active Problem List    Diagnosis Date Noted    Adult situational stress disorder 01/10/2018    Irregular menstrual cycle 01/10/2018    Muscle cramps 01/09/2018    Migraine without aura and without status migrainosus, not intractable 01/09/2018    History of hypothyroidism 10/13/2015    Allergic rhinitis due to allergen 10/13/2015    Asthma 09/24/2014    Depression 09/24/2013     Current Outpatient Medications   Medication Sig Dispense Refill    medroxyprogesterone acetate (DEPO-PROVERA IM) by IntraMUSCular route.  albuterol (PROVENTIL HFA, VENTOLIN HFA, PROAIR HFA) 90 mcg/actuation inhaler Take 2 Puffs by inhalation every four (4) hours as needed for Wheezing or Shortness of Breath. Ok to sub any brand 1 Inhaler 3    albuterol (PROVENTIL VENTOLIN) 2.5 mg /3 mL (0.083 %) nebu 3 mL by Nebulization route every four (4) hours as needed for Wheezing or Shortness of Breath. 25 Each 3    amitriptyline (ELAVIL) 25 mg tablet Take 1 Tab by mouth nightly.  30 Tab 5    metroNIDAZOLE (FLAGYL) 500 mg tablet Take 1 Tab by mouth two (2) times a day for 7 days. For bacterial vaginosis 14 Tab 0    ibuprofen (MOTRIN) 800 mg tablet Take 1 Tab by mouth every eight (8) hours as needed for Pain. 90 Tab 0    butalbital-acetaminophen-caffeine (ESGIC) -40 mg per tablet Take 1 Tab by mouth every four (4) hours as needed for Pain. 12 Tab 0    promethazine (PHENERGAN) 25 mg tablet Take 1 Tab by mouth every six (6) hours as needed (migraine or nausea). 12 Tab 0     Allergies   Allergen Reactions    Latex Swelling    Latex Rash    Onion Anaphylaxis    Oxycodone Anaphylaxis and Shortness of Breath    Tomato Anaphylaxis    Egg Rash    Hydrocodone Shortness of Breath and Itching    Mushroom Rash    Shellfish Derived Swelling     Past Medical History:   Diagnosis Date    Anemia     Asthma     Fall 2006    fall at work and injured back    Hypothyroidism     hypothyriod     Vitamin D deficiency      Past Surgical History:   Procedure Laterality Date    HX HERNIA REPAIR  10/07/2016    open ventral hernia repair by Dr. Rupali Mcdonald.  HX HERNIA REPAIR  10/07/2016    open ventral hernia repair by Dr Echo Santiago Right 1/2015    had to rebrake because u=it healed wrong     Family History   Problem Relation Age of Onset    Heart Disease Maternal Grandmother     Stroke Maternal Grandmother      Social History     Tobacco Use    Smoking status: Never Smoker    Smokeless tobacco: Never Used   Substance Use Topics    Alcohol use:  Yes     Alcohol/week: 0.0 standard drinks     Comment: rare      Social History     Social History Narrative    Works in a retirement with juveniles, very stressful job    Has autistic son with her    Older son lives in [address] [place] [address] [address]: denies fever  Pulm: admits to mild cough she thinks is asthma      Objective:     Vitals:    03/31/20 1430   BP: 120/76   Pulse: 85   Resp: 16   Temp: 98.5 °F (36.9 °C)   TempSrc: Oral   SpO2: 99%   Weight: 189 lb (85.7 kg)   Height: 5' 10\" (1.778 m) Body mass index is 27.12 kg/m². General: stated age, well developed, well nourished and in NAD  Skin:  No skin lesions  Abd: soft, nontender  Psych: alert and oriented to person, place, time and situation and Speech: appropriate quality, quantity and organization of sentences  : normal female externally, cervix appears normal, fishy odor with small yellow discharge, no uterine or adnexal enlargement or tenderness      Assessment/Plan:       ICD-10-CM ICD-9-CM    1. Acute vaginitis N76.0 616.10 NUSWAB VAGINITIS PLUS   2. Generalized abdominal pain R10.84 789.07    3. Mild intermittent asthma without complication U80.15 886.19    4. Adult situational stress disorder F43.20 308.9    5. STD exposure Z20.2 V01.6 HEP B SURFACE AB      HEP B SURFACE AG      HEPATITIS C AB      HIV 1/2 AG/AB, 4TH GENERATION,W RFLX CONFIRM      RPR      NUSWAB VAGINITIS PLUS        Orders Placed This Encounter    HEP B SURFACE AB    HEP B SURFACE AG    HEPATITIS C AB    HIV 1/2 AG/AB, 4TH GENERATION,W RFLX CONFIRM    RPR    NUSWAB VAGINITIS PLUS    medroxyprogesterone acetate (DEPO-PROVERA IM)    albuterol (PROVENTIL HFA, VENTOLIN HFA, PROAIR HFA) 90 mcg/actuation inhaler    albuterol (PROVENTIL VENTOLIN) 2.5 mg /3 mL (0.083 %) nebu    amitriptyline (ELAVIL) 25 mg tablet    metroNIDAZOLE (FLAGYL) 500 mg tablet       She thinks bv and is probaby right  abd exam very benign  Treat with flagyl while tests pending  Did tests for all STDs as she has reason to think she was exposed  Asthma mild intermittent but will reflil rescue meds for prn use  Reviewed worrisome signs or symptoms for which to call. Discussed the diagnosis and plan and she expressed understanding. F/u 6 mo CPE?   Follow-up and Dispositions    · Return in about 6 months (around 9/30/2020) for Full Physical.         Barbara Lyle MD

## 2020-04-01 LAB
HBV SURFACE AB SER QL: REACTIVE
HBV SURFACE AG SERPL QL IA: NEGATIVE
HCV AB S/CO SERPL IA: <0.1 S/CO RATIO (ref 0–0.9)
HIV 1+2 AB+HIV1 P24 AG SERPL QL IA: NON REACTIVE
RPR SER QL: NON REACTIVE

## 2020-04-07 LAB
A VAGINAE DNA VAG QL NAA+PROBE: ABNORMAL SCORE
BVAB2 DNA VAG QL NAA+PROBE: ABNORMAL SCORE
C ALBICANS DNA VAG QL NAA+PROBE: NEGATIVE
C GLABRATA DNA VAG QL NAA+PROBE: NEGATIVE
C TRACH DNA VAG QL NAA+PROBE: NEGATIVE
MEGA1 DNA VAG QL NAA+PROBE: ABNORMAL SCORE
N GONORRHOEA DNA VAG QL NAA+PROBE: NEGATIVE
T VAGINALIS DNA VAG QL NAA+PROBE: NEGATIVE

## 2020-08-05 NOTE — PROGRESS NOTES
Miles Hilario [facility]  [address] [address] [place], [address]  [phone]             Date of visit: 8/6/2020   Subjective:      History obtained from:  the patient. Carmian Ascencio is a 40 y.o. female who presents today for   Chief Complaint   Patient presents with    Asthma     This service was provided through telehealth (doxy. me real-time video/audio) due to COVID-19 pandemic, with the patient being at home and the provider being at [facility] in [place], [place]. Others assisting in the telehealth encounter included none. 17 minutes were spent with the patient by the provider. she  and/or her healthcare decision maker is aware that this patient-initiated Telehealth encounter is a billable service, with coverage as determined by her insurance carrier. she  is aware that she  may receive a bill and has provided verbal consent to proceed: Yes, per PSR. Asthma has been acting up and job wants us to wear masks. When she flares up she can't wear the mask. Asthma symptoms are short of breath and coughing. Wheezing she can hear. Needs note for work because the mask really makes her worse when asthma flares up  Was 2 weeks ago when that happened. Once when bus a.c. went out her asthma flared.   Used to be on symbicort, didn't like the powder but willing to try again  Would not want singulair or pills    Has been getting depo [facility] health dept   Wants to get tubes tied  Has tried IUD didn't like it  Needs referral    We did STD tests back in March when she worried she could have been exposed  All negative although did have BV with a vaginal discharge  Repeat labs due, she will do at lab in [place] [id number] [address]  Hasn't been sexually active since then    Had wisdom teeth out but they have to take some bone out next week  Had an abscess, was on prednisone, antibiotic, motrin, tylenol  Is doing better now    Patient Active Problem List    Diagnosis Date Noted    Adult situational stress disorder 01/10/2018    Irregular menstrual cycle 01/10/2018    Muscle cramps 01/09/2018    Migraine without aura and without status migrainosus, not intractable 01/09/2018    History of hypothyroidism 10/13/2015    Allergic rhinitis due to allergen 10/13/2015    Asthma 09/24/2014    Depression 09/24/2013     Current Outpatient Medications   Medication Sig Dispense Refill    fluticasone propionate (FLOVENT HFA) 220 mcg/actuation inhaler Take 1 Puff by inhalation two (2) times a day. 3 Inhaler 3    medroxyprogesterone acetate (DEPO-PROVERA IM) by IntraMUSCular route.  albuterol (PROVENTIL HFA, VENTOLIN HFA, PROAIR HFA) 90 mcg/actuation inhaler Take 2 Puffs by inhalation every four (4) hours as needed for Wheezing or Shortness of Breath. Ok to sub any brand 1 Inhaler 3    albuterol (PROVENTIL VENTOLIN) 2.5 mg /3 mL (0.083 %) nebu 3 mL by Nebulization route every four (4) hours as needed for Wheezing or Shortness of Breath. 25 Each 3    amitriptyline (ELAVIL) 25 mg tablet Take 1 Tab by mouth nightly. 30 Tab 5    ibuprofen (MOTRIN) 800 mg tablet Take 1 Tab by mouth every eight (8) hours as needed for Pain. 90 Tab 0    butalbital-acetaminophen-caffeine (ESGIC) -40 mg per tablet Take 1 Tab by mouth every four (4) hours as needed for Pain. 12 Tab 0    promethazine (PHENERGAN) 25 mg tablet Take 1 Tab by mouth every six (6) hours as needed (migraine or nausea).  12 Tab 0     Allergies   Allergen Reactions    Latex Swelling    Latex Rash    Onion Anaphylaxis    Oxycodone Anaphylaxis and Shortness of Breath    Tomato Anaphylaxis    Egg Rash    Hydrocodone Shortness of Breath and Itching    Mushroom Rash    Shellfish Derived Swelling     Past Medical History:   Diagnosis Date    Anemia     Asthma     Fall 2006    fall at work and injured back    Hypothyroidism     hypothyriod     Vitamin D deficiency      Past Surgical History:   Procedure Laterality Date  HX HERNIA REPAIR  10/07/2016    open ventral hernia repair by Dr. Becky Lezama.  HX HERNIA REPAIR  10/07/2016    open ventral hernia repair by Dr Becky Shelton Right 1/2015    had to rebrake because u=it healed wrong     Family History   Problem Relation Age of Onset    Heart Disease Maternal Grandmother     Stroke Maternal Grandmother      Social History     Tobacco Use    Smoking status: Never Smoker    Smokeless tobacco: Never Used   Substance Use Topics    Alcohol use: Yes     Alcohol/week: 0.0 standard drinks     Comment: rare      Social History     Social History Narrative    Works in a penitentiary with juveniles, very stressful job    Has autistic son with her    Older son lives in [address] [place] [address] Dr: denies fever now but was earlier with tooth problem.  denies STD symptoms     Objective: There were no vitals filed for this visit. There is no height or weight on file to calculate BMI. General: stated age, well developed, well nourished and in NAD  Skin:  No lesions noted on video  Pulm: appears to be breathing comfortably, able to talk in full sentences  Psych: alert and oriented to person, place, time and situation and Speech: appropriate quality, quantity and organization of sentences     Assessment/Plan:       ICD-10-CM ICD-9-CM    1. Moderate persistent asthma without complication  S21.28 245.85    2. Unwanted fertility  Z30.09 V25.09 REFERRAL TO OBSTETRICS AND GYNECOLOGY   3. Pain, dental  K08.89 525.9    4.  STD exposure  Z20.2 V01.6 CHLAMYDIA/GC PCR      HIV 1/2 AG/AB, 4TH GENERATION,W RFLX CONFIRM      RPR        Orders Placed This Encounter    CHLAMYDIA/GC PCR    HIV 1/2 AG/AB, 4TH GENERATION,W RFLX CONFIRM    RPR    REFERRAL TO OBSTETRICS AND GYNECOLOGY    fluticasone propionate (FLOVENT HFA) 220 mcg/actuation inhaler       Asthma not controlled  Add steroid inhaler  Let me know if not working, like needing albuterol often  Gave note to be allowed to take off mask when feeling asthma attack but hope these will stop with the steroid inhaler  Explained to rinse mouth after use    Refer for tubal ligation as requested  Will get her repeat STD tests too    Dental pain improving but to have more surgery next week in the bone    Discussed the diagnosis and plan and she expressed understanding. F/u 6-12 mo CPE?   Follow-up and Dispositions    · Return in about 1 year (around 8/6/2021) for Full Peter Stauffer MD

## 2020-08-06 ENCOUNTER — VIRTUAL VISIT (OUTPATIENT)
Dept: FAMILY MEDICINE CLINIC | Age: 45
End: 2020-08-06
Payer: COMMERCIAL

## 2020-08-06 DIAGNOSIS — K08.89 PAIN, DENTAL: ICD-10-CM

## 2020-08-06 DIAGNOSIS — Z30.09 UNWANTED FERTILITY: ICD-10-CM

## 2020-08-06 DIAGNOSIS — Z20.2 STD EXPOSURE: ICD-10-CM

## 2020-08-06 DIAGNOSIS — J45.40 MODERATE PERSISTENT ASTHMA WITHOUT COMPLICATION: Primary | ICD-10-CM

## 2020-08-06 PROCEDURE — 99214 OFFICE O/P EST MOD 30 MIN: CPT | Performed by: FAMILY MEDICINE

## 2020-08-06 RX ORDER — FLUTICASONE PROPIONATE 220 UG/1
1 AEROSOL, METERED RESPIRATORY (INHALATION) 2 TIMES DAILY
Qty: 3 INHALER | Refills: 3 | Status: SHIPPED | OUTPATIENT
Start: 2020-08-06 | End: 2020-11-27 | Stop reason: SDUPTHER

## 2020-08-06 NOTE — PATIENT INSTRUCTIONS
Asthma in Adults: Care Instructions Your Care Instructions During an asthma attack, your airways swell and narrow as a reaction to certain things (triggers). This makes it hard to breathe. You may be able to prevent asthma attacks if you avoid the things that set off your asthma symptoms. Keeping your asthma under control and treating symptoms before they get bad can help you avoid severe attacks. If you can control your asthma, you may be able to do all of your normal daily activities. You may also avoid asthma attacks and trips to the hospital. 
Follow-up care is a key part of your treatment and safety. Be sure to make and go to all appointments, and call your doctor if you are having problems. It's also a good idea to know your test results and keep a list of the medicines you take. How can you care for yourself at home? · Follow your asthma action plan so you can manage your symptoms at home. An asthma action plan will help you prevent and control airway reactions and will tell you what to do during an asthma attack. If you do not have an asthma action plan, work with your doctor to build one. · Take your asthma medicine exactly as prescribed. Medicine plays an important role in controlling asthma. Talk to your doctor right away if you have any questions about what to take and how to take it. ? Use your quick-relief medicine when you have symptoms of an attack. Quick-relief medicine often is an albuterol inhaler. Some people need to use quick-relief medicine before they exercise. ? Take your controller medicine every day, not just when you have symptoms. Controller medicine is usually an inhaled corticosteroid. The goal is to prevent problems before they occur. Do not use your controller medicine to try to treat an attack that has already started. It does not work fast enough to help.  
? If your doctor prescribed corticosteroid pills to use during an attack, take them as directed. They may take hours to work, but they may shorten the attack and help you breathe better. ? Keep your quick-relief medicine with you at all times. · Talk to your doctor before using other medicines. Some medicines, such as aspirin, can cause asthma attacks in some people. · Check yourself for asthma symptoms to know which step to follow in your action plan. Watch for things like being short of breath, having chest tightness, coughing, and wheezing. Also notice if symptoms wake you up at night or if you get tired quickly when you exercise. · If you have a peak flow meter, use it to check how well you are breathing. This can help you predict when an asthma attack is going to occur. Then you can take medicine to prevent the asthma attack or make it less severe. · See your doctor regularly. These visits will help you learn more about asthma and what you can do to control it. Your doctor will monitor your treatment to make sure the medicine is helping you. · Keep track of your asthma attacks and your treatment. After you have had an attack, write down what triggered it, what helped end it, and any concerns you have about your asthma action plan. Take your diary when you see your doctor. You can then review your asthma action plan and decide if it is working. · Do not smoke or allow others to smoke around you. Avoid smoky places. Smoking makes asthma worse. If you need help quitting, talk to your doctor about stop-smoking programs and medicines. These can increase your chances of quitting for good. · Learn what triggers an asthma attack for you, and avoid the triggers when you can. Common triggers include colds, smoke, air pollution, dust, pollen, mold, pets, cockroaches, stress, and cold air. · Avoid colds and the flu. Get a pneumococcal vaccine shot. If you have had one before, ask your doctor whether you need a second dose.  Get a flu vaccine every fall. If you must be around people with colds or the flu, wash your hands often. When should you call for help? [id number] anytime you think you may need emergency care. For example, call if: 
· You have severe trouble breathing. Call your doctor now or seek immediate medical care if: 
· Your symptoms do not get better after you have followed your asthma action plan. · You cough up yellow, dark brown, or bloody mucus (sputum). Watch closely for changes in your health, and be sure to contact your doctor if: 
· Your coughing and wheezing get worse. · You need to use quick-relief medicine on more than 2 days a week (unless it is just for exercise). · You need help figuring out what is triggering your asthma attacks. Where can you learn more? Go to http://danika-sarah.info/ Enter P597 in the search box to learn more about \"Asthma in Adults: Care Instructions. \" Current as of: February 24, 2020               Content Version: 12.5 © 2006-2020 [employer]. Care instructions adapted under license by [employer] (which disclaims liability or warranty for this information). If you have questions about a medical condition or this instruction, always ask your healthcare professional. [address] any warranty or liability for your use of this information.

## 2020-08-06 NOTE — LETTER
NOTIFICATION RETURN TO WORK / SCHOOL 
 
2020 1:16 PM 
 
[facility] [address] [facility] [address] 
 1975 To Whom It May Concern: 
 
Gaby Garcia is currently under the care of LISA Oh 53. She has asthma and is not able to wear a mask when it is flared up and short of breath. We are trying to help control her asthma better, so I don't think the flare ups will be often. If there are questions or concerns please have the patient contact our office. Sincerely, Kell Busch MD

## 2020-10-29 ENCOUNTER — OFFICE VISIT (OUTPATIENT)
Dept: FAMILY MEDICINE CLINIC | Age: 45
End: 2020-10-29
Payer: COMMERCIAL

## 2020-10-29 VITALS
TEMPERATURE: 98.5 F | WEIGHT: 192 LBS | OXYGEN SATURATION: 99 % | BODY MASS INDEX: 27.49 KG/M2 | DIASTOLIC BLOOD PRESSURE: 79 MMHG | HEART RATE: 74 BPM | RESPIRATION RATE: 18 BRPM | HEIGHT: 70 IN | SYSTOLIC BLOOD PRESSURE: 110 MMHG

## 2020-10-29 DIAGNOSIS — N92.6 IRREGULAR MENSTRUAL CYCLE: ICD-10-CM

## 2020-10-29 DIAGNOSIS — R35.0 URINARY FREQUENCY: ICD-10-CM

## 2020-10-29 DIAGNOSIS — K62.5 RECTAL BLEEDING: ICD-10-CM

## 2020-10-29 DIAGNOSIS — Z00.00 ENCOUNTER FOR PREVENTIVE CARE: Primary | ICD-10-CM

## 2020-10-29 DIAGNOSIS — R14.0 ABDOMINAL BLOATING: ICD-10-CM

## 2020-10-29 DIAGNOSIS — K59.09 CHRONIC CONSTIPATION: ICD-10-CM

## 2020-10-29 DIAGNOSIS — Z32.00 POSSIBLE PREGNANCY, NOT YET CONFIRMED: ICD-10-CM

## 2020-10-29 DIAGNOSIS — Z13.220 ENCOUNTER FOR SCREENING FOR LIPID DISORDER: ICD-10-CM

## 2020-10-29 LAB
BILIRUB UR QL STRIP: NEGATIVE
GLUCOSE UR-MCNC: NEGATIVE MG/DL
HCG URINE, QL. (POC): NEGATIVE
KETONES P FAST UR STRIP-MCNC: ABNORMAL MG/DL
PH UR STRIP: 7 [PH] (ref 4.6–8)
PROT UR QL STRIP: NEGATIVE
SP GR UR STRIP: 1.03 (ref 1–1.03)
UA UROBILINOGEN AMB POC: ABNORMAL (ref 0.2–1)
URINALYSIS CLARITY POC: ABNORMAL
URINALYSIS COLOR POC: ABNORMAL
URINE BLOOD POC: ABNORMAL
URINE LEUKOCYTES POC: NEGATIVE
URINE NITRITES POC: NEGATIVE
VALID INTERNAL CONTROL?: YES

## 2020-10-29 PROCEDURE — 99214 OFFICE O/P EST MOD 30 MIN: CPT | Performed by: FAMILY MEDICINE

## 2020-10-29 PROCEDURE — 99396 PREV VISIT EST AGE 40-64: CPT | Performed by: FAMILY MEDICINE

## 2020-10-29 PROCEDURE — 81003 URINALYSIS AUTO W/O SCOPE: CPT | Performed by: FAMILY MEDICINE

## 2020-10-29 PROCEDURE — 81025 URINE PREGNANCY TEST: CPT | Performed by: FAMILY MEDICINE

## 2020-10-29 RX ORDER — CEPHALEXIN 500 MG/1
500 CAPSULE ORAL 3 TIMES DAILY
Qty: 15 CAP | Refills: 0 | Status: SHIPPED | OUTPATIENT
Start: 2020-10-29 | End: 2020-11-03

## 2020-10-29 RX ORDER — ALBUTEROL SULFATE 0.83 MG/ML
2.5 SOLUTION RESPIRATORY (INHALATION)
Qty: 25 EACH | Refills: 3 | Status: SHIPPED | OUTPATIENT
Start: 2020-10-29 | End: 2020-11-27 | Stop reason: SDUPTHER

## 2020-10-29 NOTE — PROGRESS NOTES
Chief Complaint   Patient presents with    Bloated     Pt states that her stomach has been getting bigger and belives she may be pregnant.  Urinary Frequency       1. Have you been to the ER, urgent care clinic since your last visit? Hospitalized since your last visit? No    2. Have you seen or consulted any other health care providers outside of the [address] [place] [address] since your last visit? Include any pap smears or colon screening. No    Pt states she is on the Depo shot and with that she does not get a regular cycle. Pt is sexually active but denies any pregnancy symptoms at this time besides the bloating the stomach. Pt states the at home pregnancy test was inconclusive.

## 2020-10-29 NOTE — PATIENT INSTRUCTIONS
Would be ok to use miralax or metamucil daily to control constipation Dr. Rene Calvillo (call to schedule colonoscopy) [facility], [address] [facility] [address] [place] [address] [place] [address] 
[phone] Well Visit, Ages 25 to 48: Care Instructions Your Care Instructions Physical exams can help you stay healthy. Your doctor has checked your overall health and may have suggested ways to take good care of yourself. He or she also may have recommended tests. At home, you can help prevent illness with healthy eating, regular exercise, and other steps. Follow-up care is a key part of your treatment and safety. Be sure to make and go to all appointments, and call your doctor if you are having problems. It's also a good idea to know your test results and keep a list of the medicines you take. How can you care for yourself at home? · Reach and stay at a healthy weight. This will lower your risk for many problems, such as obesity, diabetes, heart disease, and high blood pressure. · Get at least 30 minutes of physical activity on most days of the week. Walking is a good choice. You also may want to do other activities, such as running, swimming, cycling, or playing tennis or team sports. Discuss any changes in your exercise program with your doctor. · Do not smoke or allow others to smoke around you. If you need help quitting, talk to your doctor about stop-smoking programs and medicines. These can increase your chances of quitting for good. · Talk to your doctor about whether you have any risk factors for sexually transmitted infections (STIs). Having one sex partner (who does not have STIs and does not have sex with anyone else) is a good way to avoid these infections. · Use birth control if you do not want to have children at this time. Talk with your doctor about the choices available and what might be best for you. · Protect your skin from too much sun. When you're outdoors from 10 a.m. to 4 p.m., stay in the shade or cover up with clothing and a hat with a wide brim. Wear sunglasses that block UV rays. Even when it's cloudy, put broad-spectrum sunscreen (SPF 30 or higher) on any exposed skin. · See a dentist one or two times a year for checkups and to have your teeth cleaned. · Wear a seat belt in the car. Follow your doctor's advice about when to have certain tests. These tests can spot problems early. For everyone · Cholesterol. Have the fat (cholesterol) in your blood tested after age 21. Your doctor will tell you how often to have this done based on your age, family history, or other things that can increase your risk for heart disease. · Blood pressure. Have your blood pressure checked during a routine doctor visit. Your doctor will tell you how often to check your blood pressure based on your age, your blood pressure results, and other factors. · Vision. Talk with your doctor about how often to have a glaucoma test. 
· Diabetes. Ask your doctor whether you should have tests for diabetes. · Colon cancer. Your risk for colorectal cancer gets higher as you get older. Some experts say that adults should start regular screening at age 48 and stop at age 76. Others say to start before age 48 or continue after age 76. Talk with your doctor about your risk and when to start and stop screening. For women · Breast exam and mammogram. Talk to your doctor about when you should have a clinical breast exam and a mammogram. Medical experts differ on whether and how often women under 50 should have these tests. Your doctor can help you decide what is right for you. · Cervical cancer screening test and pelvic exam. Begin with a Pap test at age 24.  The test often is part of a pelvic exam. Starting at age 27, you may choose to have a Pap test, an HPV test, or both tests at the same time (called co-testing). Talk with your doctor about how often to have testing. · Tests for sexually transmitted infections (STIs). Ask whether you should have tests for STIs. You may be at risk if you have sex with more than one person, especially if your partners do not wear condoms. For men · Tests for sexually transmitted infections (STIs). Ask whether you should have tests for STIs. You may be at risk if you have sex with more than one person, especially if you do not wear a condom. · Testicular cancer exam. Ask your doctor whether you should check your testicles regularly. · Prostate exam. Talk to your doctor about whether you should have a blood test (called a PSA test) for prostate cancer. Experts differ on whether and when men should have this test. Some experts suggest it if you are older than 39 and are -American or have a father or brother who got prostate cancer when he was younger than 72. When should you call for help? Watch closely for changes in your health, and be sure to contact your doctor if you have any problems or symptoms that concern you. Where can you learn more? Go to http://www.[employer].com/ Enter P072 in the search box to learn more about \"Well Visit, Ages 25 to 48: Care Instructions. \" Current as of: May 27, 2020               Content Version: 12.6 © 2222-3842 [employer]. Care instructions adapted under license by [employer] (which disclaims liability or warranty for this information). If you have questions about a medical condition or this instruction, always ask your healthcare professional. [place] [address] any warranty or liability for your use of this information.

## 2020-10-29 NOTE — PROGRESS NOTES
[facility]  [address]. [facility], [address]  [phone]             Date of visit: 10/29/2020   Subjective:      History obtained from:  the patient. Artem Baires is a 40 y.o. female who presents today for   Chief Complaint   Patient presents with    Bloated     Pt states that her stomach has been getting bigger and belives she may be pregnant.  Urinary Frequency     Due for preventive visit too    Worried she is pregnant because abdomen looking much larger despite weight being about the same  Still on depo shots (no periods). Was going to get tubes tied but has not done yet  Also having urinary frequency, like every 30 minutes. That has been going on for weeks to months but is concerned she may have infection  Does admit to fairly frequent constipation  Just took a citramax and emptied out yesterday, doesn't feel constipated now    Trying to eat well, getting veggies  Likes mac and cheese but makes her constipated  Greens, corn, baked chicken, corn, apples, oranges, pears, plus  Eating out and says she needs to stop  Just gave up soda  Drinking a lot of apple juice and cranberry juice  Lots of water  No etoh    Not really exercising these days    Thinks asthma well-controlled on flovent  Does like to use her nebulizer most nights, though, but really happy with how her breathing feels on the flovent. Did have rectal bleeding for a second time, this time was a much smaller amount.   Her hemorrhoids are large and she is pretty sure that is what was bleeding    Flu shot declined    Had wisdom teeth out and that went ok    Patient Active Problem List    Diagnosis Date Noted    Adult situational stress disorder 01/10/2018    Irregular menstrual cycle 01/10/2018    Muscle cramps 01/09/2018    Migraine without aura and without status migrainosus, not intractable 01/09/2018    History of hypothyroidism 10/13/2015    Allergic rhinitis due to allergen 10/13/2015  Asthma 09/24/2014    Depression 09/24/2013     Current Outpatient Medications   Medication Sig Dispense Refill    albuterol (PROVENTIL VENTOLIN) 2.5 mg /3 mL (0.083 %) nebu 3 mL by Nebulization route every four (4) hours as needed for Wheezing or Shortness of Breath. 25 Each 3    cephALEXin (KEFLEX) 500 mg capsule Take 1 Cap by mouth three (3) times daily for 5 days. 15 Cap 0    fluticasone propionate (FLOVENT HFA) 220 mcg/actuation inhaler Take 1 Puff by inhalation two (2) times a day. 3 Inhaler 3    medroxyprogesterone acetate (DEPO-PROVERA IM) by IntraMUSCular route.  albuterol (PROVENTIL HFA, VENTOLIN HFA, PROAIR HFA) 90 mcg/actuation inhaler Take 2 Puffs by inhalation every four (4) hours as needed for Wheezing or Shortness of Breath. Ok to sub any brand 1 Inhaler 3    amitriptyline (ELAVIL) 25 mg tablet Take 1 Tab by mouth nightly. 30 Tab 5    ibuprofen (MOTRIN) 800 mg tablet Take 1 Tab by mouth every eight (8) hours as needed for Pain. 90 Tab 0    butalbital-acetaminophen-caffeine (ESGIC) -40 mg per tablet Take 1 Tab by mouth every four (4) hours as needed for Pain. 12 Tab 0    promethazine (PHENERGAN) 25 mg tablet Take 1 Tab by mouth every six (6) hours as needed (migraine or nausea). 12 Tab 0     Allergies   Allergen Reactions    Latex Swelling    Latex Rash    Onion Anaphylaxis    Oxycodone Anaphylaxis and Shortness of Breath    Tomato Anaphylaxis    Egg Rash    Hydrocodone Shortness of Breath and Itching    Mushroom Rash    Shellfish Derived Swelling     Past Medical History:   Diagnosis Date    Anemia     Asthma     Fall 2006    fall at work and injured back    Hypothyroidism     hypothyriod     Vitamin D deficiency      Past Surgical History:   Procedure Laterality Date    HX HERNIA REPAIR  10/07/2016    open ventral hernia repair by Dr. Janes Coles.     HX HERNIA REPAIR  10/07/2016    open ventral hernia repair by Dr Faviola Medellin Right 1/2015    had to rebrake because u=it healed wrong     Family History   Problem Relation Age of Onset    Heart Disease Maternal Grandmother     Stroke Maternal Grandmother      Social History     Tobacco Use    Smoking status: Never Smoker    Smokeless tobacco: Never Used   Substance Use Topics    Alcohol use: Yes     Alcohol/week: 0.0 standard drinks     Comment: rare      Social History     Social History Narrative    Works in a intermediate with juveniles, very stressful job    Has autistic son with her    Older son lives in [facility]: denies fever  pulm denies sob     Objective:     Vitals:    10/29/20 1415   BP: 110/79   Pulse: 74   Resp: 18   Temp: 98.5 °F (36.9 °C)   TempSrc: Temporal   SpO2: 99%   Weight: 192 lb (87.1 kg)   Height: 5' 10\" (1.778 m)     Body mass index is 27.55 kg/m². General: stated age, well-developed, and in NAD  Eyes: PERRL, EOMI, no redness or drainage  Nose: no drainage  Mouth: no lesions  Throat: no erythema, exudate or swelling  Neck: supple, symmetrical, trachea midline, no adenopathy and thyroid: not enlarged, symmetric, no tenderness/mass/nodules  Lungs:  clear to auscultation w/o rales, rhonchi, wheezes w/normal effort and no use of accessory muscles of respiration   Heart: regular rate and rhythm, S1, S2 normal, no murmur, click, rub or gallop  Abdomen: soft, nontender, no masses. Has mild abdominal obesity  Ext:  No edema noted. Lymph: no cervical adenopathy appreciated  Skin:  Normal. and no rash or abnormalities   Neuro: normal gait, CN 2-12 intact  Psych: alert and oriented to person, place, time and situation and Speech: appropriate quality, quantity and organization of sentences  Assessment/Plan:       ICD-10-CM ICD-9-CM    1. Encounter for preventive care  Z00.00 V70.0 CBC WITH AUTOMATED DIFF      METABOLIC PANEL, COMPREHENSIVE      LIPID PANEL      TSH 3RD GENERATION   2. Urinary frequency  R35.0 788.41 AMB POC URINALYSIS DIP STICK AUTO W/O MICRO   3.  Chronic constipation  K59.09 564.00 TSH 3RD GENERATION   4. Abdominal bloating  R14.0 787.3 CBC WITH AUTOMATED DIFF      METABOLIC PANEL, COMPREHENSIVE   5. Rectal bleeding  K62.5 569.3 REFERRAL TO GASTROENTEROLOGY   6. Irregular menstrual cycle  N92.6 626.4    7. Possible pregnancy, not yet confirmed  Z32.00 V72.40 AMB POC URINE PREGNANCY TEST, VISUAL COLOR COMPARISON   8. Encounter for screening for lipid disorder  Z13.220 V77.91 LIPID PANEL        Orders Placed This Encounter    CBC WITH AUTOMATED DIFF    METABOLIC PANEL, COMPREHENSIVE    LIPID PANEL    TSH 3RD GENERATION    [facility] [address]     AMB POC URINE PREGNANCY TEST, VISUAL COLOR COMPARISON    AMB POC URINALYSIS DIP STICK AUTO W/O MICRO    albuterol (PROVENTIL VENTOLIN) 2.5 mg /3 mL (0.083 %) nebu    cephALEXin (KEFLEX) 500 mg capsule     Preventive up to date, other than vaccines she doesn't want  Encouraged more regular exercise  Encouraged diet improvements, particularly cutting back on juices and sugary drinks  Perhaps try fruit-flavored herbal teas without sweetener as an alternative    Not pregnant; still on depo  Does have symptoms of UTI, she thinks  Ok to try antibiotics  Did not culture due to her age under 48  She will update me on portal    Abdomen doesn't look bloated, but has some fat collection there I think she is noticing is getting more prominent as she gets older  Does have frequent constipation so advised trying miralax daily  Refer for colonoscopy due to the rectal bleeding and almost being 39. She is not sure she will be able to find transportation, but I encouraged her to try  The bleeding is probably from her hemorrhoids, as she suspects, but I can't know that for sure  She will let me know if she sees more bleeding  Discussed surgery or banding as treatments for hemorrhoids    She feels like asthma well controlled on flovent; will continue    Discussed the diagnosis and plan and she expressed understanding.   F/u 1 year if doing very well, sooner if not    Follow-up and Dispositions    · Return in about 1 year (around 10/29/2021) for Full Physical, Follow up.          Sully Canales MD

## 2020-11-02 LAB
ALBUMIN SERPL-MCNC: 4.5 G/DL (ref 3.8–4.8)
ALBUMIN/GLOB SERPL: 1.6 {RATIO} (ref 1.2–2.2)
ALP SERPL-CCNC: 81 IU/L (ref 39–117)
ALT SERPL-CCNC: 9 IU/L (ref 0–32)
AST SERPL-CCNC: 15 IU/L (ref 0–40)
BASOPHILS # BLD AUTO: 0 X10E3/UL (ref 0–0.2)
BASOPHILS NFR BLD AUTO: 1 %
BILIRUB SERPL-MCNC: 0.7 MG/DL (ref 0–1.2)
BUN SERPL-MCNC: 13 MG/DL (ref 6–24)
BUN/CREAT SERPL: 15 (ref 9–23)
C TRACH RRNA SPEC QL NAA+PROBE: NEGATIVE
CALCIUM SERPL-MCNC: 9.5 MG/DL (ref 8.7–10.2)
CHLORIDE SERPL-SCNC: 106 MMOL/L (ref 96–106)
CHOLEST SERPL-MCNC: 171 MG/DL (ref 100–199)
CO2 SERPL-SCNC: 23 MMOL/L (ref 20–29)
CREAT SERPL-MCNC: 0.86 MG/DL (ref 0.57–1)
EOSINOPHIL # BLD AUTO: 0.1 X10E3/UL (ref 0–0.4)
EOSINOPHIL NFR BLD AUTO: 1 %
ERYTHROCYTE [DISTWIDTH] IN BLOOD BY AUTOMATED COUNT: 13.1 % (ref 11.7–15.4)
GLOBULIN SER CALC-MCNC: 2.8 G/DL (ref 1.5–4.5)
GLUCOSE SERPL-MCNC: 70 MG/DL (ref 65–99)
HCT VFR BLD AUTO: 37.7 % (ref 34–46.6)
HDLC SERPL-MCNC: 51 MG/DL
HGB BLD-MCNC: 12.7 G/DL (ref 11.1–15.9)
HIV 1+2 AB+HIV1 P24 AG SERPL QL IA: NON REACTIVE
IMM GRANULOCYTES # BLD AUTO: 0 X10E3/UL (ref 0–0.1)
IMM GRANULOCYTES NFR BLD AUTO: 0 %
INTERPRETATION, 910389: NORMAL
LDLC SERPL CALC-MCNC: 105 MG/DL (ref 0–99)
LYMPHOCYTES # BLD AUTO: 2.2 X10E3/UL (ref 0.7–3.1)
LYMPHOCYTES NFR BLD AUTO: 32 %
MCH RBC QN AUTO: 29.7 PG (ref 26.6–33)
MCHC RBC AUTO-ENTMCNC: 33.7 G/DL (ref 31.5–35.7)
MCV RBC AUTO: 88 FL (ref 79–97)
MONOCYTES # BLD AUTO: 0.6 X10E3/UL (ref 0.1–0.9)
MONOCYTES NFR BLD AUTO: 9 %
N GONORRHOEA RRNA SPEC QL NAA+PROBE: NEGATIVE
NEUTROPHILS # BLD AUTO: 3.9 X10E3/UL (ref 1.4–7)
NEUTROPHILS NFR BLD AUTO: 57 %
PLATELET # BLD AUTO: 236 X10E3/UL (ref 150–450)
POTASSIUM SERPL-SCNC: 3.7 MMOL/L (ref 3.5–5.2)
PROT SERPL-MCNC: 7.3 G/DL (ref 6–8.5)
RBC # BLD AUTO: 4.28 X10E6/UL (ref 3.77–5.28)
RPR SER QL: NON REACTIVE
SODIUM SERPL-SCNC: 144 MMOL/L (ref 134–144)
TRIGL SERPL-MCNC: 81 MG/DL (ref 0–149)
TSH SERPL DL<=0.005 MIU/L-ACNC: 2.01 UIU/ML (ref 0.45–4.5)
VLDLC SERPL CALC-MCNC: 15 MG/DL (ref 5–40)
WBC # BLD AUTO: 6.8 X10E3/UL (ref 3.4–10.8)

## 2020-11-20 ENCOUNTER — OFFICE VISIT (OUTPATIENT)
Dept: FAMILY MEDICINE CLINIC | Age: 45
End: 2020-11-20
Payer: COMMERCIAL

## 2020-11-20 ENCOUNTER — TELEPHONE (OUTPATIENT)
Dept: FAMILY MEDICINE CLINIC | Age: 45
End: 2020-11-20

## 2020-11-20 VITALS
OXYGEN SATURATION: 100 % | RESPIRATION RATE: 20 BRPM | DIASTOLIC BLOOD PRESSURE: 67 MMHG | BODY MASS INDEX: 27.92 KG/M2 | HEIGHT: 70 IN | HEART RATE: 77 BPM | TEMPERATURE: 98.6 F | SYSTOLIC BLOOD PRESSURE: 110 MMHG | WEIGHT: 195 LBS

## 2020-11-20 DIAGNOSIS — J45.40 MODERATE PERSISTENT ASTHMA WITHOUT COMPLICATION: Primary | ICD-10-CM

## 2020-11-20 PROCEDURE — 99214 OFFICE O/P EST MOD 30 MIN: CPT | Performed by: FAMILY MEDICINE

## 2020-11-20 NOTE — TELEPHONE ENCOUNTER
Spoke with patient about how [facility] apparently had no record of her being there on Sunday. She will check with friend to verify which hospital she was at and on what day.

## 2020-11-20 NOTE — PROGRESS NOTES
[facility] Hilar[facility]  Brandan Robles. [facility], [address]  [phone]             Date of visit: 11/20/2020   Subjective:      History obtained from:  the patient. Wilfredo Larsen is a 40 y.o. female who presents today for   Chief Complaint   Patient presents with    Asthma     discuss medication change    Form Completion     FMLA paperwork for Asthma flare up     Was at a hospital this past Sunday when she collapsed at a friends house, not sure which hospital, [facility]  ER doctor mentioned sleep apnea testing  She is concerned about her asthma because she is missing work because of it, needs FMLA  She doesn't remember Sunday's event  Just woke up in the ER   They gave her some sort of shot in the ER, no new pills or inhalers  Says breathing better today but not going to work    Used to be on flovent to control her asthma, missing some days but not many  Using nebulizer at times but doesn't seem to hurt  Chest feels heavy, hard to breathe  Still feels the same as she did Sunday  Has missed work all week due to illness. Had neg corona test  Tightness, sharp pains in chest and going down back  Coughing a little but not much  Inhaler makes her jittery and helps her breathing a little    Still using her flovent  Not rinsing mouth afterward    On amitriptyline for migraine prophylaxis in the past but not taking it now  Still having some migraines.   It did work but doesn't want to take more meds than she had to      Patient Active Problem List    Diagnosis Date Noted    Adult situational stress disorder 01/10/2018    Irregular menstrual cycle 01/10/2018    Muscle cramps 01/09/2018    Migraine without aura and without status migrainosus, not intractable 01/09/2018    History of hypothyroidism 10/13/2015    Allergic rhinitis due to allergen 10/13/2015    Asthma 09/24/2014    Depression 09/24/2013     Current Outpatient Medications   Medication Sig Dispense Refill  albuterol (PROVENTIL VENTOLIN) 2.5 mg /3 mL (0.083 %) nebu 3 mL by Nebulization route every four (4) hours as needed for Wheezing or Shortness of Breath. 25 Each 3    fluticasone propionate (FLOVENT HFA) 220 mcg/actuation inhaler Take 1 Puff by inhalation two (2) times a day. 3 Inhaler 3    medroxyprogesterone acetate (DEPO-PROVERA IM) by IntraMUSCular route.  albuterol (PROVENTIL HFA, VENTOLIN HFA, PROAIR HFA) 90 mcg/actuation inhaler Take 2 Puffs by inhalation every four (4) hours as needed for Wheezing or Shortness of Breath. Ok to sub any brand 1 Inhaler 3     Allergies   Allergen Reactions    Latex Swelling    Latex Rash    Onion Anaphylaxis    Oxycodone Anaphylaxis and Shortness of Breath    Tomato Anaphylaxis    Egg Rash    Hydrocodone Shortness of Breath and Itching    Mushroom Rash    Shellfish Derived Swelling     Past Medical History:   Diagnosis Date    Anemia     Asthma     Fall 2006    fall at work and injured back    Hypothyroidism     hypothyriod     Vitamin D deficiency      Past Surgical History:   Procedure Laterality Date    HX HERNIA REPAIR  10/07/2016    open ventral hernia repair by Dr. Marcela Galicia. 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 General: stated age, well developed, well nourished and in NAD  Neck: supple, symmetrical, trachea midline, no adenopathy and thyroid: not enlarged, symmetric, no tenderness/mass/nodules  Lungs:  clear to auscultation w/o rales, rhonchi, wheezes w/normal effort and no use of accessory muscles of respiration   Heart: regular rate and rhythm, S1, S2 normal, no murmur, click, rub or gallop  Ext:  No edema noted. Lymph: no cervical adenopathy appreciated  Skin:  Normal. and no rash or abnormalities   Psych: alert and oriented to person, place, time and situation and Speech: appropriate quality, quantity and organization of sentences    Assessment/Plan:       ICD-10-CM ICD-9-CM    1. Moderate persistent asthma without complication  X62.94 737.27 PULMONARY FUNCTION TEST        Orders Placed This Encounter    PULMONARY FUNCTION TEST     Told her I would fill out FMLA but I need to see ER record first to understand what was going on. She doesn't recall any details, includnig which hospital she was at. Sounds like maybe [facility] so will try to get records from them and call her back. She thinks her dx was asthma. Lungs sound great today and I thought had been doing well on her flovent when I saw her last  Will go ahead and order PFTs  Advised continue flovent, albuterol prn for now    Discussed the diagnosis and plan and she expressed understanding. F/u 1 year if doing very well, sooner if not    Follow-up and Dispositions    · Return in about 3 months (around 2/20/2021) for Follow up.          Song Gonzalez MD

## 2020-11-20 NOTE — PROGRESS NOTES
Chief Complaint   Patient presents with    Asthma     discuss medication change    Form Completion     [facility] paperwork for Asthma flare up       1. Have you been to the ER, urgent care clinic since your last visit? Hospitalized since your last visit? Yes When: 11/15/20 Where: [address] Reason for visit: Asthma attack    2. Have you seen or consulted any other health care providers outside of the [address] [place] [address] since your last visit? Include any pap smears or colon screening.  No    3 most recent PHQ Screens 11/20/2020   Little interest or pleasure in doing things Not at all   Feeling down, depressed, irritable, or hopeless Not at all   Total Score PHQ 2 0

## 2020-11-20 NOTE — PATIENT INSTRUCTIONS
To schedule the pulmonary function test, please call: 
[phone] [facility] scheduling) They might require a covid test first 
 
Learning About Asthma Triggers What are triggers? When you have asthma, certain things can make your symptoms worse. These are called triggers. They include: · Cigarette smoke or air pollution. · Things you are allergic to, such as: 
¨ Pollen, mold, or dust mites. ¨ Pet hair, skin, or saliva. · Illnesses, like colds, flu, or pneumonia. · Exercise. · Dry, cold air. How do triggers affect asthma? Triggers can make it harder for your lungs to work as they should and can lead to sudden difficulty breathing and other symptoms. When you are around a trigger, an asthma attack is more likely. If your symptoms are severe, you may need emergency treatment or have to go to the hospital for treatment. If you know what your triggers are and can avoid them, you may be able to prevent asthma attacks, reduce how often you have them, and make them less severe. What can you do to avoid triggers? The first thing is to know your triggers. When you are having symptoms, note the things around you that might be causing them. Then look for patterns in what may be triggering your symptoms. Record your triggers on a piece of paper or in an asthma diary. When you have your list of possible triggers, work with your doctor to find ways to avoid them. You also can check how well your lungs are working by measuring your peak expiratory flow (PEF) throughout the day. Your PEF may drop when you are near things that trigger symptoms. Here are some ways to avoid a few common triggers. · Do not smoke or allow others to smoke around you. If you need help quitting, talk to your doctor about stop-smoking programs and medicines. These can increase your chances of quitting for good.  
· If there is a lot of pollution, pollen, or dust outside, stay at home and keep your windows closed. Use an air conditioner or air filter in your home. Check your local weather report or newspaper for air quality and pollen reports. · Get a flu shot every year. Talk to your doctor about getting a pneumococcal shot. Wash your hands often to prevent infections. · Avoid exercising outdoors in cold weather. If you are outdoors in cold weather, wear a scarf around your face and breathe through your nose. How can you manage an asthma attack? · If you have an asthma action plan, follow the plan. In general: ¨ Use your quick-relief inhaler as directed by your doctor. If your symptoms do not get better after you use your medicine, have someone take you to the emergency room. Call an ambulance if needed. ¨ If your doctor has given you other inhaled medicines or steroid pills, take them as directed. Where can you learn more? Go to [employer] Enter A814 in the search box to learn more about \"Learning About Asthma Triggers. \"  
© 3259-2560 [employer]wise, [employer]orporated. Care instructions adapted under license by [facility] (which disclaims liability or warranty for this information). This care instruction is for use with your licensed healthcare professional. If you have questions about a medical condition or this instruction, always ask your healthcare professional. [place] [address] any warranty or liability for your use of this information. Content Version: 14.1.626925; Last Revised: February 23, 2012

## 2020-11-20 NOTE — LETTER
2020 9:55 AM 
 
Ms. Ahmet Tom [address] [phone] 
 1975 Fax to [phone] STAT request 
 
 
Dear medical records dept, My patient, Ahmet Tom thinks she was seen for fainting and asthma in the [facility] ER on , 11/15/20. She is actually not completely sure which ER she was in. Could you please send any ER records from the past week to me, fax [phone]? I am waiting on these records to determine her next course of treatment, as she doesn't remember anything about the ER visit. Sincerely, Nahomy Spring MD

## 2020-11-23 NOTE — TELEPHONE ENCOUNTER
Pt is returning Dr. Howe Search call. Pt is available until 2pm. Pt advised that if she doesn't answer the first time to call right back reception is spotty in her home.      [facility] # [phone]

## 2020-11-23 NOTE — TELEPHONE ENCOUNTER
If she happens to call back and someone talks to her please ask what hospital ER she was at. That is all I need to know, so that I can get the records. Thanks!

## 2020-11-24 NOTE — TELEPHONE ENCOUNTER
Left her another voicemail asking her to call back and leave a message with name of hospital she went to

## 2020-11-25 ENCOUNTER — APPOINTMENT (OUTPATIENT)
Dept: GENERAL RADIOLOGY | Age: 45
End: 2020-11-25
Attending: EMERGENCY MEDICINE
Payer: COMMERCIAL

## 2020-11-25 ENCOUNTER — HOSPITAL ENCOUNTER (EMERGENCY)
Age: 45
Discharge: HOME | End: 2020-11-25
Attending: EMERGENCY MEDICINE
Payer: COMMERCIAL

## 2020-11-25 ENCOUNTER — APPOINTMENT (OUTPATIENT)
Dept: CT IMAGING | Age: 45
End: 2020-11-25
Attending: EMERGENCY MEDICINE
Payer: COMMERCIAL

## 2020-11-25 VITALS
RESPIRATION RATE: 21 BRPM | SYSTOLIC BLOOD PRESSURE: 128 MMHG | HEART RATE: 82 BPM | OXYGEN SATURATION: 97 % | HEIGHT: 70 IN | TEMPERATURE: 98.7 F | BODY MASS INDEX: 27.06 KG/M2 | WEIGHT: 189 LBS | DIASTOLIC BLOOD PRESSURE: 95 MMHG

## 2020-11-25 DIAGNOSIS — M79.10 MYALGIA: Primary | ICD-10-CM

## 2020-11-25 DIAGNOSIS — R55 SYNCOPE, UNSPECIFIED SYNCOPE TYPE: ICD-10-CM

## 2020-11-25 DIAGNOSIS — S00.03XA CONTUSION OF SCALP, INITIAL ENCOUNTER: ICD-10-CM

## 2020-11-25 LAB
ALBUMIN SERPL-MCNC: 3.9 G/DL (ref 3.5–5)
ALBUMIN/GLOB SERPL: 1.1 {RATIO} (ref 1.1–2.2)
ALP SERPL-CCNC: 80 U/L (ref 45–117)
ALT SERPL-CCNC: 20 U/L (ref 12–78)
ANION GAP SERPL CALC-SCNC: 5 MMOL/L (ref 5–15)
APPEARANCE UR: CLEAR
AST SERPL-CCNC: 15 U/L (ref 15–37)
ATRIAL RATE: 80 BPM
BASOPHILS # BLD: 0 K/UL (ref 0–0.1)
BASOPHILS NFR BLD: 1 % (ref 0–1)
BILIRUB SERPL-MCNC: 0.6 MG/DL (ref 0.2–1)
BILIRUB UR QL: NEGATIVE
BUN SERPL-MCNC: 11 MG/DL (ref 6–20)
BUN/CREAT SERPL: 12 (ref 12–20)
CALCIUM SERPL-MCNC: 9.2 MG/DL (ref 8.5–10.1)
CALCULATED P AXIS, ECG09: 69 DEGREES
CALCULATED R AXIS, ECG10: 54 DEGREES
CALCULATED T AXIS, ECG11: 65 DEGREES
CHLORIDE SERPL-SCNC: 109 MMOL/L (ref 97–108)
CO2 SERPL-SCNC: 26 MMOL/L (ref 21–32)
COLOR UR: NORMAL
COMMENT, HOLDF: NORMAL
CREAT SERPL-MCNC: 0.94 MG/DL (ref 0.55–1.02)
DIAGNOSIS, 93000: NORMAL
DIFFERENTIAL METHOD BLD: NORMAL
EOSINOPHIL # BLD: 0 K/UL (ref 0–0.4)
EOSINOPHIL NFR BLD: 0 % (ref 0–7)
ERYTHROCYTE [DISTWIDTH] IN BLOOD BY AUTOMATED COUNT: 12.8 % (ref 11.5–14.5)
GLOBULIN SER CALC-MCNC: 3.5 G/DL (ref 2–4)
GLUCOSE SERPL-MCNC: 93 MG/DL (ref 65–100)
GLUCOSE UR STRIP.AUTO-MCNC: NEGATIVE MG/DL
HCT VFR BLD AUTO: 36.3 % (ref 35–47)
HGB BLD-MCNC: 11.8 G/DL (ref 11.5–16)
HGB UR QL STRIP: NEGATIVE
IMM GRANULOCYTES # BLD AUTO: 0 K/UL (ref 0–0.04)
IMM GRANULOCYTES NFR BLD AUTO: 0 % (ref 0–0.5)
KETONES UR QL STRIP.AUTO: NEGATIVE MG/DL
LEUKOCYTE ESTERASE UR QL STRIP.AUTO: NEGATIVE
LYMPHOCYTES # BLD: 1.1 K/UL (ref 0.8–3.5)
LYMPHOCYTES NFR BLD: 26 % (ref 12–49)
MCH RBC QN AUTO: 29.4 PG (ref 26–34)
MCHC RBC AUTO-ENTMCNC: 32.5 G/DL (ref 30–36.5)
MCV RBC AUTO: 90.5 FL (ref 80–99)
MONOCYTES # BLD: 0.6 K/UL (ref 0–1)
MONOCYTES NFR BLD: 13 % (ref 5–13)
NEUTS SEG # BLD: 2.5 K/UL (ref 1.8–8)
NEUTS SEG NFR BLD: 60 % (ref 32–75)
NITRITE UR QL STRIP.AUTO: NEGATIVE
NRBC # BLD: 0 K/UL (ref 0–0.01)
NRBC BLD-RTO: 0 PER 100 WBC
P-R INTERVAL, ECG05: 134 MS
PH UR STRIP: 7 [PH] (ref 5–8)
PLATELET # BLD AUTO: 171 K/UL (ref 150–400)
PMV BLD AUTO: 10.4 FL (ref 8.9–12.9)
POTASSIUM SERPL-SCNC: 3.5 MMOL/L (ref 3.5–5.1)
PROT SERPL-MCNC: 7.4 G/DL (ref 6.4–8.2)
PROT UR STRIP-MCNC: NEGATIVE MG/DL
Q-T INTERVAL, ECG07: 354 MS
QRS DURATION, ECG06: 72 MS
QTC CALCULATION (BEZET), ECG08: 408 MS
RBC # BLD AUTO: 4.01 M/UL (ref 3.8–5.2)
SAMPLES BEING HELD,HOLD: NORMAL
SODIUM SERPL-SCNC: 140 MMOL/L (ref 136–145)
SP GR UR REFRACTOMETRY: 1.01 (ref 1–1.03)
UR CULT HOLD, URHOLD: NORMAL
UROBILINOGEN UR QL STRIP.AUTO: 0.2 EU/DL (ref 0.2–1)
VENTRICULAR RATE, ECG03: 80 BPM
WBC # BLD AUTO: 4.2 K/UL (ref 3.6–11)

## 2020-11-25 PROCEDURE — 74011250637 HC RX REV CODE- 250/637: Performed by: EMERGENCY MEDICINE

## 2020-11-25 PROCEDURE — 70450 CT HEAD/BRAIN W/O DYE: CPT

## 2020-11-25 PROCEDURE — 74011250636 HC RX REV CODE- 250/636: Performed by: EMERGENCY MEDICINE

## 2020-11-25 PROCEDURE — 71045 X-RAY EXAM CHEST 1 VIEW: CPT

## 2020-11-25 PROCEDURE — 72125 CT NECK SPINE W/O DYE: CPT

## 2020-11-25 PROCEDURE — 87635 SARS-COV-2 COVID-19 AMP PRB: CPT

## 2020-11-25 PROCEDURE — 81003 URINALYSIS AUTO W/O SCOPE: CPT

## 2020-11-25 PROCEDURE — 80053 COMPREHEN METABOLIC PANEL: CPT

## 2020-11-25 PROCEDURE — 99284 EMERGENCY DEPT VISIT MOD MDM: CPT

## 2020-11-25 PROCEDURE — 93005 ELECTROCARDIOGRAM TRACING: CPT

## 2020-11-25 PROCEDURE — 96360 HYDRATION IV INFUSION INIT: CPT

## 2020-11-25 PROCEDURE — 85025 COMPLETE CBC W/AUTO DIFF WBC: CPT

## 2020-11-25 RX ORDER — ACETAMINOPHEN 325 MG/1
650 TABLET ORAL
Status: COMPLETED | OUTPATIENT
Start: 2020-11-25 | End: 2020-11-25

## 2020-11-25 RX ADMIN — ACETAMINOPHEN 650 MG: 325 TABLET ORAL at 09:49

## 2020-11-25 RX ADMIN — SODIUM CHLORIDE 1000 ML: 900 INJECTION, SOLUTION INTRAVENOUS at 08:33

## 2020-11-25 NOTE — ED PROVIDER NOTES
This is a 22-year-old female with a history of asthma and hypothyroidism. She does not smoke or drink and works as a . She states on Saturday of this week which is 5 days ago, she started developing a sore throat and some shortness of breath and little aching. She states that those symptoms have gotten progressively worse and has developed a loss of taste and loss of smell. She complains of generalized body aches and again some occasional cough which is been productive. She has had no fever or chill. She denies any diarrhea. Today and yesterday she developed a little bit of dizziness and states that her vision is a little blurry. She was brought to the ER by a friend with suitcase. While standing at the  she had what appeared to be a syncopal episode and struck her head on the floor. The nurse who witnessed the entire event stated that she eased herself to the floor on her buttocks and fell backwards. She felt that the noise that occurred with the fall was more from the suitcase hitting the floor and not her head. She does not think she hit the head very hard. The patient according to the nurse did not lose consciousness and her eyes were fluttering. The patient now complains of a headache in the right posterior portion of her scalp and also has some neck pain. She denies any numbness or tingling and no weakness. She currently voices no other complaint. When asked if she had been exposed to any known patients with Covid she states that she has a coworker who just announced that she tested positive yesterday. Patient denies any other acute symptoms. Past Medical History:   Diagnosis Date    Anemia     Asthma     Fall 2006    fall at work and injured back    Hypothyroidism     hypothyriod     Vitamin D deficiency        Past Surgical History:   Procedure Laterality Date    HX HERNIA REPAIR  10/07/2016    open ventral hernia repair by Dr. Fany Back.     HX HERNIA REPAIR 10/07/2016    open ventral hernia repair by Dr Hurman Klinefelter Right 1/2015    had to rebrake because u=it healed wrong         Family History:   Problem Relation Age of Onset    Heart Disease Maternal Grandmother     Stroke Maternal Grandmother        Social History     Socioeconomic History    Marital status: SINGLE     Spouse name: Not on file    Number of children: Not on file    Years of education: Not on file    Highest education level: Not on file   Occupational History    Not on file   Social Needs    Financial resource strain: Not on file    Food insecurity     Worry: Not on file     Inability: Not on file    Transportation needs     Medical: Not on file     Non-medical: Not on file   Tobacco Use    Smoking status: Never Smoker    Smokeless tobacco: Never Used   Substance and Sexual Activity    Alcohol use: Yes     Alcohol/week: 0.0 standard drinks     Comment: rare    Drug use: No    Sexual activity: Not Currently     Partners: Male   Lifestyle    Physical activity     Days per week: Not on file     Minutes per session: Not on file    Stress: Not on file   Relationships    Social connections     Talks on phone: Not on file     Gets together: Not on file     Attends [employer] service: Not on file     Active member of club or organization: Not on file     Attends meetings of clubs or organizations: Not on file     Relationship status: Not on file    Intimate partner violence     Fear of current or ex partner: Not on file     Emotionally abused: Not on file     Physically abused: Not on file     Forced sexual activity: Not on file   Other Topics Concern    Not on file   Social History Narrative    Works in a halfway with juveniles, very stressful job    Has autistic son with her    Older son lives in [facility] 23: Latex; Latex; Onion; Oxycodone; Tomato; Egg; Hydrocodone;  Mushroom; and Shellfish derived    Review of Systems   Constitutional: Negative for activity change, appetite change, chills, fatigue and fever. HENT: Negative for ear pain, facial swelling, sore throat and trouble swallowing. Eyes: Negative for pain, discharge and visual disturbance. Respiratory: Positive for cough ( Occasionally productive) and shortness of breath. Negative for chest tightness and wheezing. Cardiovascular: Negative for chest pain and palpitations. Gastrointestinal: Negative for abdominal pain, blood in stool, nausea and vomiting. Genitourinary: Negative for difficulty urinating, flank pain and hematuria. Musculoskeletal: Positive for myalgias ( Generalized). Negative for arthralgias, joint swelling and neck pain. Skin: Negative for color change and rash. Neurological: Positive for dizziness and headaches. Negative for weakness and numbness. Blurred vision, loss of taste and smell. Hematological: Negative for adenopathy. Does not bruise/bleed easily. Psychiatric/Behavioral: Negative for behavioral problems, confusion and sleep disturbance. All other systems reviewed and are negative. Vitals:    11/25/20 0742   BP: (!) 139/97   Pulse: 85   Resp: 18   Temp: 98.7 °F (37.1 °C)   SpO2: 100%   Weight: 85.7 kg (189 lb)   Height: 5' 10\" (1.778 m)            Physical Exam  Vitals signs and nursing note reviewed. Constitutional:       General: She is not in acute distress. Appearance: She is well-developed. Comments: Vital signs are as noted. The patient's temperature is 98.7 blood pressure is 139/97 her pulse is 85 and her respirations are 18 with 100% saturations. That he is on room air. HENT:      Head: Normocephalic and atraumatic. Nose: Nose normal.   Eyes:      General: No scleral icterus. Conjunctiva/sclera: Conjunctivae normal.      Pupils: Pupils are equal, round, and reactive to light. Neck:      Musculoskeletal: Normal range of motion and neck supple. Thyroid: No thyromegaly. Vascular: No JVD.       Trachea: No tracheal deviation. Comments: No carotid bruits noted. Cardiovascular:      Rate and Rhythm: Normal rate and regular rhythm. Heart sounds: Normal heart sounds. No murmur. No friction rub. No gallop. Pulmonary:      Effort: Pulmonary effort is normal. No respiratory distress. Breath sounds: Normal breath sounds. No wheezing or rales. Chest:      Chest wall: No tenderness. Abdominal:      General: Bowel sounds are normal. There is no distension. Palpations: Abdomen is soft. There is no mass. Tenderness: There is no abdominal tenderness. There is no guarding or rebound. Musculoskeletal: Normal range of motion. General: No tenderness. Lymphadenopathy:      Cervical: No cervical adenopathy. Skin:     General: Skin is warm and dry. Findings: No erythema or rash. Neurological:      Mental Status: She is alert and oriented to person, place, and time. Cranial Nerves: No cranial nerve deficit. Coordination: Coordination normal.      Deep Tendon Reflexes: Reflexes are normal and symmetric. Psychiatric:         Behavior: Behavior normal.         Thought Content: Thought content normal.         Judgment: Judgment normal.          MDM  Number of Diagnoses or Management Options     Amount and/or Complexity of Data Reviewed  Clinical lab tests: ordered and reviewed  Tests in the radiology section of CPT®: ordered and reviewed  Decide to obtain previous medical records or to obtain history from someone other than the patient: yes  Review and summarize past medical records: yes  Independent visualization of images, tracings, or specimens: yes    Risk of Complications, Morbidity, and/or Mortality  Presenting problems: high  Diagnostic procedures: high  Management options: high    Patient Progress  Patient progress: stable         Procedures    The patient clinically sounds as though she has Covid. She has normal vital signs.   With the myalgias, the cough and shortness of breath, the lack of taste and smell and exposure to known Covid, raises significant concern the patient has the disease. Will CT her head and neck and obtain a chest x-ray. The patient will be given a liter of fluid and blood work is checked because of the syncopal episode. ED MD EKG interpretation: Normal sinus rhythm at 80 beats a minute. There is no ectopy noted. Axis is normal and intervals are normal.  No acute ischemic changes noted. Queen Lan MD    The patient's lab, chest x-ray and physical exam are all consistent with the presentation of Covid. The patient does not want to accept that. I have advised her to go home and quarantine and given her a work note for suspected Covid. An outpatient Covid test is being done which will be sent to [address] [place] [address]. I have told her that she can expect a result in 24 to 48 hours. If it is positive she will receive a phone call. In the meantime she is to treat this with conservative measures. She voices understanding and is discharged home.

## 2020-11-25 NOTE — ED NOTES
Verbal shift change report given to WALDEMAR Luis (oncoming nurse) by Elton Grimes (offgoing nurse). Report included the following information SBAR, ED Summary, MAR and Recent Results.

## 2020-11-25 NOTE — ED NOTES
Bedside and Verbal shift change report given to [address] [place] [address] [address] (oncoming nurse) by Prosper Hoyt (offgoing nurse). Report included the following information SBAR, Kardex, ED Summary, [facility] and Recent Results.

## 2020-11-25 NOTE — ED TRIAGE NOTES
Pt BIB POV by friend c/o SOB with exertion, dizziness, generalized body aches, congestion, \"burning\" throat, loss of taste/smell, hemopytsis and cough x11/16/20; upon ED arrival pt had syncopal episode lasting 30 seconds, hit head on floor, new neck/shoulder pain on right side s/p fall;  A+Ox4, GCS 15, VSS; denies fever    Past Medical History:   Diagnosis Date    Anemia     Asthma     Fall 2006    fall at work and injured back    Hypothyroidism     hypothyriod     Vitamin D deficiency

## 2020-11-25 NOTE — LETTER
[address] 
[id number] [phone] Work/School Note Date: 11/25/2020 To Whom It May concern: 
 
Elana Alba was evaulated by the following provider(s): 
Attending Provider: Beka Sommers MD.   [address] virus is suspected. Per the CDC guidelines we recommend home isolation until the following conditions are all met: 1. At least 10 days have passed since symptoms first appeared and 2. At least 24 hours have passed since last fever without the use of fever-reducing medications and 
3. Symptoms (e.g., cough, shortness of breath) have improved Sincerely, Judy Munoz MD

## 2020-11-25 NOTE — DISCHARGE INSTRUCTIONS
Patient Education        Learning How To Care for Someone Who Has COVID-19  Things to know  Most people who get COVID-19 will recover with time and home care. Here are some things to know if you're caring for someone who's sick. · Treat the symptoms. Common symptoms include a fever, coughing, and feeling short of breath. Urge the person to get extra rest and drink plenty of fluids to replace fluids lost from fever. To reduce a fever, offer acetaminophen (such as Tylenol). It may also help with muscle aches. Read and follow all instructions on the label. · Watch for signs that the illness is getting worse. The person may need medical care if they're getting sicker (for example, if it's hard to breathe). But call the doctor's office before you go. They can tell you what to do. Call 911 or emergency services if the person has any of these symptoms:  ? Severe trouble breathing or shortness of breath. ? Constant pain or pressure in their chest.  ? Confusion, or trouble thinking clearly. ? A blue tint to their lips or face. Some people are more likely to get very sick and need medical care. Call the doctor as soon as symptoms start if the person you're caring for is over 72, smokes, or has a serious health problem, like asthma, heart disease, diabetes, or an immune system problem. · Protect yourself and others. The virus spreads easily from person to person, so take extra care to avoid catching or spreading the infection. ? Keep the sick person away from others as much as you can. § Have the person stay in one room. If you can, give them their own bathroom to use. § Have only one person take care of them. Keep other people--and pets--out of the sickroom. § Have the person wear a cloth face cover around other people. This includes when anyone is in the room with them or if they leave their room (for example, to go to the bathroom).  If the face cover makes it harder for the sick person to breathe, the other person should wear a face cover. § Don't share personal items. These include dishes, cups, towels, and bedding. ? Wash your hands often and well. Use soap and water, and scrub for at least 20 seconds. This is especially important after you've been around the sick person or touched things they've touched. If soap and water aren't handy, use a hand  with at least 60% alcohol. ? Avoid touching your mouth, nose, and eyes. ? Take care with the person's laundry. It's okay to wash the sick person's laundry with yours. If you have them, wear disposable gloves when handling their dirty laundry, and wash your hands well after you touch it. Wash items in the warmest water allowed for the fabric type, and dry them completely. ? Clean high-touch items every day and anytime the sick person touches them. These include doorknobs, light switches, toilets, counters, and remote controls. Use a household disinfectant or a homemade bleach solution. (Follow the directions on the label.) If the sick person has their own room, have them disinfect it every day. ? Limit visitors to your home. To help protect family and friends, stay in touch with them only by phone or computer. Current as of: July 10, 2020               Content Version: 12.6  © 1046-8405 [employer]. Care instructions adapted under license by [employer] (which disclaims liability or warranty for this information). If you have questions about a medical condition or this instruction, always ask your healthcare professional. [place] [address] any warranty or liability for your use of this information. You will need to go home and quarantine. I believe your symptoms are consistent with Covid. We are testing you and it will take 24 to 48 hours for that result return. If this is positive, you will be contacted. Tylenol for discomfort.   You may use your regular asthma medication and over the counter medicines for cough or congestion.

## 2020-11-26 LAB
COVID-19, XGCOVT: DETECTED
HEALTH STATUS, XMCV2T: ABNORMAL
SOURCE, COVRS: ABNORMAL
SPECIMEN SOURCE, FCOV2M: ABNORMAL
SPECIMEN TYPE, XMCV1T: ABNORMAL

## 2020-11-27 ENCOUNTER — PATIENT OUTREACH (OUTPATIENT)
Dept: CASE MANAGEMENT | Age: 45
End: 2020-11-27

## 2020-11-27 ENCOUNTER — VIRTUAL VISIT (OUTPATIENT)
Dept: FAMILY MEDICINE CLINIC | Age: 45
End: 2020-11-27
Payer: COMMERCIAL

## 2020-11-27 DIAGNOSIS — R55 SYNCOPE AND COLLAPSE: ICD-10-CM

## 2020-11-27 DIAGNOSIS — U07.1 COVID-19: Primary | ICD-10-CM

## 2020-11-27 DIAGNOSIS — J45.40 MODERATE PERSISTENT ASTHMA WITHOUT COMPLICATION: ICD-10-CM

## 2020-11-27 PROCEDURE — 99214 OFFICE O/P EST MOD 30 MIN: CPT | Performed by: FAMILY MEDICINE

## 2020-11-27 RX ORDER — ALBUTEROL SULFATE 90 UG/1
2 AEROSOL, METERED RESPIRATORY (INHALATION)
Qty: 1 INHALER | Refills: 3 | Status: SHIPPED | OUTPATIENT
Start: 2020-11-27

## 2020-11-27 RX ORDER — ALBUTEROL SULFATE 0.83 MG/ML
2.5 SOLUTION RESPIRATORY (INHALATION)
Qty: 25 EACH | Refills: 3 | Status: SHIPPED | OUTPATIENT
Start: 2020-11-27

## 2020-11-27 NOTE — PROGRESS NOTES
Patient contacted regarding COVID-19 diagnosis. Discussed COVID-19 related testing which was available at this time. Test results were positive. Patient informed of results, if available? Per chart review, patient was previously notified of test result on 11/26. Outreach made within 2 business days of discharge: Yes    Care Transition Nurse/ Ambulatory Care Manager/ LPN Care Coordinator contacted the patient by telephone to perform post discharge assessment; lvm requesting a return phone call to this ACM. Pt was advised to f/u with PCP (Dr. Toño Mcghee Provider) post-discharge; per chart review, patient completed virtual f/u with PCP today. [facility] follow up appointment(s):   Future Appointments   Date Time Provider Denis Castellon   12/11/2020 10:30 AM PULMONARY LAB [address] [place] [address]       Patient has following risk factors of: asthma. Rx - none.

## 2020-11-27 NOTE — PROGRESS NOTES
Miles Hliario [facility]  [address] [facility], [address]  [phone]             Date of visit: 11/27/2020   Subjective:      History obtained from:  the patient. Hedy Duncan is a 40 y.o. female who presents today for   Chief Complaint   Patient presents with    Fever     This service was provided through telehealth (doxy. me real-time video/audio) due to COVID-19 pandemic, with the patient being at home and the provider being at [facility] in [place]. Others assisting in the telehealth encounter included none. 29 minutes were spent with the patient by the provider. she  and/or her healthcare decision maker is aware that this patient-initiated Telehealth encounter is a billable service, with coverage as determined by her insurance carrier. she  is aware that she  may receive a bill and has provided verbal consent to proceed: Yes, per PSR. Feeling sick, wants to discuss covid symptoms    Had positive COVID-19 test in ER on 11/25/20. Started feeling sick on Saturday, 11/21 with sore throat, shortness of breath, achiness. She says now she is not sure what day her symptoms started. Those symptoms got worse and with loss of taste and smell  Occasional cough and dizziness   When seen in ER fainted (nurse saw her ease herself to the floore on her buttocks and then fall backwards, hit her head but didn't seem to be hard). Nurse didn't see her lose consiousness and her eyes were fluttering. Had some posterior HA and neck pain after this fall. Had normal CT head in ER. CT neck was unchanged from 2 years ago, has known degenerative disk disease with mild cervical spinal stenosis at C4-5    All day yesterday her vision was blurred. Both ears were hurting yesterday. They still hurt. Shooting pain in front of both ears  Runny nose and could not breathe out of nose yesterday.   Coughing some but not as much, is better  Not feeling sob or like she has asthma now  Still having fever and body aches  Little light-headed  Little nausea but not vomiting today, did vomit yesterday  No diarrhea    I had seen her on 11/20 about asthma flare. She was needing [facility] paperwork. Seemed ok when I saw her, but she had been in a hospital on 11/15. Could not remember which hospital or the details of the visit. Apparently she had fainted at a friend's house. I was unable to get records from [facility], asked her to find out from friend which hospital she was at. She checked with her friend, was in ambulance, they took her to [facility] but she left because when she woke up she was freaking out about not having a mask on. Friend said she fainted, maybe had a seizure. Supposed to see pulmonologist on 12/10 and wondering if she will be able to go to that appt    Her son needs a heart transplant  He is 15  Will need paperwork for childcare due to his quarantine  Doesn't want to go back because she is scared, wants to take the full 12 weeks. Patient Active Problem List    Diagnosis Date Noted    Adult situational stress disorder 01/10/2018    Irregular menstrual cycle 01/10/2018    Muscle cramps 01/09/2018    Migraine without aura and without status migrainosus, not intractable 01/09/2018    History of hypothyroidism 10/13/2015    Allergic rhinitis due to allergen 10/13/2015    Asthma 09/24/2014    Depression 09/24/2013     Current Outpatient Medications   Medication Sig Dispense Refill    albuterol (PROVENTIL HFA, VENTOLIN HFA, PROAIR HFA) 90 mcg/actuation inhaler Take 2 Puffs by inhalation every four (4) hours as needed for Wheezing or Shortness of Breath. Ok to sub any brand 1 Inhaler 3    albuterol (PROVENTIL VENTOLIN) 2.5 mg /3 mL (0.083 %) nebu 3 mL by Nebulization route every four (4) hours as needed for Wheezing or Shortness of Breath.  25 Each 3    OTHER Needs new mask/tubing for nebulizer; (J45.40) Moderate persistent asthma without complication 1 Each 0    fluticasone propionate (FLOVENT HFA) 220 mcg/actuation inhaler Take 1 Puff by inhalation two (2) times a day. 3 Inhaler 3    medroxyprogesterone acetate (DEPO-PROVERA IM) by IntraMUSCular route. Allergies   Allergen Reactions    Latex Swelling    Latex Rash    Onion Anaphylaxis    Oxycodone Anaphylaxis and Shortness of Breath    Tomato Anaphylaxis    Egg Rash    Hydrocodone Shortness of Breath and Itching    Mushroom Rash    Shellfish Derived Swelling     Past Medical History:   Diagnosis Date    Anemia     Asthma     Fall 2006    fall at work and injured back    Hypothyroidism     hypothyriod     Vitamin D deficiency      Past Surgical History:   Procedure Laterality Date    HX HERNIA REPAIR  10/07/2016    open ventral hernia repair by Dr. Flaquita Varghese.  HX HERNIA REPAIR  10/07/2016    open ventral hernia repair by Dr Miguelangel Uribe Right 1/2015    had to rebrake because u=it healed wrong     Family History   Problem Relation Age of Onset    Heart Disease Maternal Grandmother     Stroke Maternal Grandmother      Social History     Tobacco Use    Smoking status: Never Smoker    Smokeless tobacco: Never Used   Substance Use Topics    Alcohol use: Yes     Alcohol/week: 0.0 standard drinks     Comment: rare      Social History     Social History Narrative    Works in a penitentiary with juveniles, very stressful job    Has autistic son with her    Older son lives in [address] [place] [address] denies diarrhea  ENT admits to sore throat     Objective: There were no vitals filed for this visit. There is no height or weight on file to calculate BMI.      General: stated age, well developed, well nourished and in NAD, appears well  Skin: no lesions seen on video  Psych: alert and oriented to person, place, time and situation and Speech: appropriate quality, quantity and organization of sentences     Assessment/Plan:       ICD-10-CM ICD-9-CM    1. COVID-19  U07.1 079.89 2. Syncope and collapse  R55 780.2    3. Moderate persistent asthma without complication  L66.47 383.15 OTHER        Orders Placed This Encounter    albuterol (PROVENTIL HFA, VENTOLIN HFA, PROAIR HFA) 90 mcg/actuation inhaler    albuterol (PROVENTIL VENTOLIN) 2.5 mg /3 mL (0.083 %) nebu    OTHER       Has been sick for about 7 days, not improving yet  Asthma not flared up  Discussed supportive treatments  Will do FMLA for her to stay out of work the full 12 weeks due to her asthma, as requested  She is very concerned about getting covid again; explained that was very unlikely  Still no more details about her syncopal episode as she [facility] ER without being seen, but has not happened again  Discussed plan for if asthma starts to flare again  She will see pulm on 12/10    Discussed the diagnosis and plan and she expressed understanding.         Kaitlynn Villareal MD

## 2020-11-27 NOTE — PATIENT INSTRUCTIONS
10 Things to Do When You Have COVID-19 Stay home. Don't go to school, work, or public areas. And don't use public transportation, ride-shares, or taxis unless you have no choice. Leave your home only if you need to get medical care. But call the doctor's office first so they know you're coming. And wear a cloth face cover. Ask before leaving isolation. Talk with your doctor or other health professional about when it will be safe for you to leave isolation. Wear a cloth face cover when you are around other people. It can help stop the spread of the virus when you cough or sneeze. Limit contact with people in your home. If possible, stay in a separate bedroom and use a separate bathroom. Avoid contact with pets and other animals. If possible, have a friend or family member care for them while you're sick. Cover your mouth and nose with a tissue when you cough or sneeze. Then throw the tissue in the trash right away. Wash your hands often, especially after you cough or sneeze. Use soap and water, and scrub for at least 20 seconds. If soap and water aren't available, use an alcohol-based hand . Don't share personal household items. These include bedding, towels, cups and glasses, and eating utensils. Clean and disinfect your home every day. Use household  or disinfectant wipes or sprays. Take special care to clean things that you grab with your hands. These include doorknobs, remote controls, phones, and handles on your refrigerator and microwave. And don't forget countertops, tabletops, bathrooms, and computer keyboards. Take acetaminophen (Tylenol) to relieve fever and body aches. Read and follow all instructions on the label. Current as of: July 10, 2020               Content Version: 12.6 © 2006-2020 [employer].   
Care instructions adapted under license by [employer] (which disclaims liability or warranty for this information). If you have questions about a medical condition or this instruction, always ask your healthcare professional. [address] any warranty or liability for your use of this information.

## 2020-11-28 NOTE — PROGRESS NOTES
2020  4:01 PM    Patient contacted regarding COVID-19 diagnosis. Discussed COVID-19 related testing which was available at this time. Test results were positive. Patient informed of results, if available? Per chart review, patient was previously notified of test result on . Outreach made within 2 business days of discharge: Yes    Care Transition Nurse/ Ambulatory Care Manager/ LPN Care Coordinator contacted the patient by telephone to perform post discharge assessment. Verified name and  with patient as identifiers. Provided introduction to self, and explanation of the CTN/ACM/LPN role, and reason for call due to risk factors for infection and/or exposure to COVID-19. Symptoms reviewed with patient who verbalized the following symptoms: fever, fatigue, pain or aching joints, cough, loss or taste or smell, sweating and dizziness/lightheadedness. Patient reports temporal temperature of 104. 5F while on telephone with ACM today; note that fever was not present during ED visit on . Patient reports onset of fever last night. Patient reported last dose of Vicks Dayquil (w/fever reducer-acetaminophen) was this morning at 0730 AM.  Patient reports taking Vicks Dayquil twice daily. Patient read package instructions to ACM over the telephone and, per package instructions, dose can be taken every four hours not to exceed four doses in 24 hour period. Patient took dose of Vicks Dayquil while on the telephone with ACM. Patient reports that she does not have any acetaminophen at home; states that a friend will drop some off on her front porch for her. Advised patient not to take dayquil/nyquil and acetaminophen (Tylenol) concurrently as acetaminophen is already an ingredient in dayquil and nyquil.       Due to new onset of symptoms and patient reported fever of 104.5F ACM recommended that patient call 911 immediately to be transported to the ED for further evaluation and treatment; pt refuses EMS and ED visit because she cannot leave her autistic son at home alone and she does not have any one that is able to watch him (her family lives out of state and her friend/neighbor will not watch him due to patient diagnosis of COVID-19). Patient reports that she does not have any friends or neighbors that are able to watch her son this evening while she goes to the ED. ACM educated patient on [address], however she is also not agreeable to this at this time because it would cause her autistic son stress (patient is staying in a specific room in the house-her bedroom). ACM educated patient on risks associated with fever of 103F or higher; pt verbalizes understanding and continues to refuse EMS or ED visit. Patient states that if fever is not improved within the next hour then she will call 911 and go to the ED at that time. Patient rechecked temporal temperature at 4:29PM during this call and reports that temperature is now 103.1F. Discussed follow-up appointments. If no appointment was previously scheduled, appointment scheduling offered: N/A; completed virtual f/u with PCP on 11/27  [facility] follow up appointment(s):   Future Appointments   Date Time Provider Denis Castellon   12/11/2020 10:30 AM PULMONARY  [facility]. [facility]      [facility] follow up appointment(s): N/A      Advance Care Planning:   Does patient have an Advance Directive: not reviewed    Patient has following risk factors of: asthma. CTN/ACM/LPN reviewed discharge instructions, medical action plan and red flags such as increased shortness of breath, increasing fever and signs of decompensation with patient who verbalized understanding. Discussed exposure protocols and quarantine with CDC Guidelines What to do if you are sick with coronavirus disease 2019.  Patient was given an opportunity for questions and concerns.  The patient agrees to contact the [facility] exposure line [phone], local [facility] department [address] [place] [address] Department of Health  ([phone]) and PCP office for questions related to their healthcare. CTN/ACM provided contact information for future needs. Reviewed and educated patient on any new and changed medications related to discharge diagnosis; Rx- none. Patient/family/caregiver given information for [employer] and agrees to enroll yes  Patient's preferred e-mail:  [email or web address]  Patient's preferred phone number: [phone]  Based on Loop alert triggers, patient will be contacted by nurse care manager for worsening symptoms. Pt will be further monitored by COVID Loop Team based on severity of symptoms and risk factors.

## 2020-11-28 NOTE — PROGRESS NOTES
11/28/2020  3:31 PM    Second patient outreach attempt by this ACM to perform initial post-discharge assessment; lvm requesting a return phone call to this ACM. [employer] message routed to patient with COVID-19 resources. COVID Care Transitions episode resolved at this time due to ACM inability to reach patient.

## 2020-12-03 ENCOUNTER — TELEPHONE (OUTPATIENT)
Dept: FAMILY MEDICINE CLINIC | Age: 45
End: 2020-12-03

## 2020-12-03 ENCOUNTER — PATIENT OUTREACH (OUTPATIENT)
Dept: CASE MANAGEMENT | Age: 45
End: 2020-12-03

## 2020-12-03 NOTE — TELEPHONE ENCOUNTER
S/w patient and she would like FMLA forms revised to state only asthma and not covid, as her job has a separate pay for workers that get [place]. She would like the FMLA to be for Asthma only. She would also like a letter stating when she was dx'd with covid and that she may return once she has a negative test. Both should be faxed back to the job number on the form. FMLA forms placed on providers desk notating for revision and request from patient.

## 2020-12-03 NOTE — TELEPHONE ENCOUNTER
Patient called stated call several time can't come in has Covid. Patient was told that I have to take message for the provider. She refused to leave message insist on holding on, stated her job is on the line. Spoke to [place] told me transfer the call.

## 2020-12-03 NOTE — TELEPHONE ENCOUNTER
----- Message from [place] sent at 12/3/2020  2:23 PM EST -----  Regarding: Dr. Scott Velasquez Message/Vendor Calls    Caller's first and last name: Freddy Pearson      Reason for call: Requesting to speak with Fer Christianson to get an update on paperwork.        Callback required yes/no and why: yes      Best contact number(s):[phone]      Details to clarify the request: [address]

## 2020-12-03 NOTE — LETTER
NOTIFICATION about FMLA 
 
12/4/2020 4:21 PM 
 
Ms. Gayle Hays [facility] [address] [id number] To Whom It May Concern: 
 
Gayle Hays is currently under the care of LISA Joseph. She wishes to withdraw her request for FMLA at this time. Sincerely, Renée Frederick MD
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
## 2020-12-03 NOTE — PROGRESS NOTES
Yellow alert noted in remote symptom monitoring program. Messaged patient to notify Laron Garnett if symptoms have worsened since yesterday or if they would like to have a nurse reach out.

## 2020-12-04 ENCOUNTER — PATIENT OUTREACH (OUTPATIENT)
Dept: CASE MANAGEMENT | Age: 45
End: 2020-12-04

## 2020-12-04 NOTE — TELEPHONE ENCOUNTER
[id number] Sharda Crum will do letter to deny her FLMA request.  I already did a work note for the Seb Sen to go back on 12/1/20. Could you mail or fax both to her?

## 2020-12-04 NOTE — TELEPHONE ENCOUNTER
Returned call to patient name and  verified. Attempted to inform patient of providers documentation. She cut me off and stated that she does Not need FMLA for asthma. She is not taking FMLA at all. She wants for the doctor to send in a letter stating to cancel her recent FMLA forms that were submitted. Patient stated that is ridiculous that she has had to wait this long and that Dr Donna Stoll has not called her personally and is having everyone one else speak with the patient. Patient inquired as to when requested letter could be send. Advised I would send a message to the provider with the patients request. She again stated. I DO NOT WANT FMLA AT THIS TIME. Once I feel better I will return back to work. \"I still have to go see my asthma doctor\" and once I feel better and test negative I have to see Dr Donna Stoll to get released to work. Patient states she will have new forms coming.  But reiterated that she needs a letter sent to deny her recent FMLA request.
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
## 2020-12-04 NOTE — PROGRESS NOTES
2020  3:49 PM    AC received voicemail from patient on 2020 requesting return phone call from this ACM regarding the need for a letter to be sent to her employer advising of her COVID-19 diagnosis. [facility] returned phone call to patient today. Patient contacted regarding COVID-19 diagnosis. Discussed COVID-19 related testing which was available at this time. Test results were positive. Patient informed of results, if available? Per chart review, patient was previously notified of test result on . Outreach made within 2 business days of discharge: Yes    Care Transition Nurse/ Ambulatory Care Manager/ LPN Care Coordinator contacted the patient by telephone to perform follow-up assessment. Verified name and  with patient as identifiers. Symptoms reviewed with patient who verbalized the following symptoms: fever, fatigue, pain or aching joints, cough, loss or taste or smell, sweating, diarrhea, dizziness/lightheadedness and no worsening symptoms. Patient reports new symptom onset of diarrhea this morning. Due to new onset of symptoms encounter was routed to provider for escalation. Patient is also advised to contact PCP office via telephone to notify of new symptom onset. ACM encouraged patient to get rest and stay hydrated; reports that she is drinking water and hot tea. Patient reports current fever of 99.9F; reports last dose of tylenol was approximately 30 minutes ago. Reviewed CDC isolation guidelines and 'When you can be around others after being sick with COVID-19' with patient; pt verbalizes understanding. Patient is inquiring about having her 15year old son tested for COVID. Patient is advised to contact her son's pediatrician to be further advised regarding this. Education provided regarding infection prevention, and signs and symptoms of COVID-19 and when to seek medical attention with patient who verbalized understanding.  Discussed exposure protocols and quarantine from [address] you at higher risk for severe illness 2019 and given an opportunity for questions and concerns. The patient agrees to contact the COVID-19 hotline [phone] or PCP office for questions related to their healthcare. CTN/ACM/LPN provided contact information for future reference. From CDC: Are you at higher risk for severe illness?  Wash your hands often.  Avoid close contact (6 feet, which is about two arm lengths) with people who are sick.  Put distance between yourself and other people if COVID-19 is spreading in your community.  Clean and disinfect frequently touched surfaces.  Avoid all cruise travel and non-essential air travel.  Call your healthcare professional if you have concerns about COVID-19 and your underlying condition or if you are sick. For more information on steps you can take to protect yourself, see CDC's How to Protect Yourself      Pt will be further monitored by COVID Loop Team based on severity of symptoms and risk factors.

## 2020-12-05 ENCOUNTER — PATIENT OUTREACH (OUTPATIENT)
Dept: CASE MANAGEMENT | Age: 45
End: 2020-12-05

## 2020-12-05 NOTE — PROGRESS NOTES
Yellow alert noted in remote symptom monitoring program. Messaged patient to notify Ana Ugalde if symptoms have worsened since yesterday or if they would like to have a nurse reach out.

## 2020-12-07 NOTE — TELEPHONE ENCOUNTER
I faxed the letter Friday evening to her employer that she was wanting to cancel her FMLA at this time.  I also faxed a letter releasing her to go back to work after Allie

## 2020-12-07 NOTE — TELEPHONE ENCOUNTER
Patient does not want to be excused from work for covid. Her job is aware of that, but the previously completed FMLA for just needed to have anything pertaining to covid removed from it. She wanted a letter stating that the FMLA is cancelled at this time and may re-visit it at a later day upon flare ups.

## 2020-12-08 ENCOUNTER — PATIENT OUTREACH (OUTPATIENT)
Dept: CASE MANAGEMENT | Age: 45
End: 2020-12-08

## 2020-12-08 NOTE — PROGRESS NOTES
Yellow alert noted in remote symptom monitoring program. Messaged patient to notify Solis Gunter if symptoms have worsened since yesterday or if they would like to have a nurse reach out.

## 2020-12-10 ENCOUNTER — PATIENT OUTREACH (OUTPATIENT)
Dept: CASE MANAGEMENT | Age: 45
End: 2020-12-10

## 2020-12-10 NOTE — PROGRESS NOTES
Yellow alert noted in remote symptom monitoring program. Messaged patient to notify Rekha Blank if symptoms have worsened since yesterday or if they would like to have a nurse reach out.

## 2020-12-12 ENCOUNTER — PATIENT OUTREACH (OUTPATIENT)
Dept: CASE MANAGEMENT | Age: 45
End: 2020-12-12

## 2020-12-12 NOTE — PROGRESS NOTES
Yellow alert noted in remote COVID-19 Loop Symptom Monitoring Program. Messaged patient to notify Tahir Juan if symptoms have worsened since yesterday.

## 2020-12-13 ENCOUNTER — PATIENT OUTREACH (OUTPATIENT)
Dept: CASE MANAGEMENT | Age: 45
End: 2020-12-13

## 2020-12-13 NOTE — PROGRESS NOTES
Date/Time:  2020 11:15 AM    Patient contacted regarding Loop Alert received. Verified patients name and  as identifiers. RN contacted the patient by telephone regarding yellow Loop alert. Patient reported the following symptoms: fever, pain or aching joints and diarrhea. Due to continued symptoms, patient was advised to self monitor symptoms at home. Due to no new or worsening symptoms encounter was not routed to provider for escalation. Education provided regarding infection prevention, and signs and symptoms of COVID-19 and when to seek medical attention with patient who verbalized understanding. Discussed exposure protocols and quarantine from [address] you at higher risk for severe illness  and given an opportunity for questions and concerns. The patient agrees to contact agrees to contact their provider, COVID-19 hotline [phone], or [facility] LISA Sophia [address]  ([phone] for questions related to their healthcare. Author provided contact information for future needs. From CDC: Are you at higher risk for severe illness?  Wash your hands often.  Avoid close contact (6 feet, which is about two arm lengths) with people who are sick.  Put distance between yourself and other people if COVID-19 is spreading in your community.  Clean and disinfect frequently touched surfaces.  Avoid all cruise travel and non-essential air travel.  Call your healthcare professional if you have concerns about COVID-19 and your underlying condition or if you are sick. For more information on steps you can take to protect yourself, see CDC's How to Protect Yourself      Patient will continue to self report symptoms using Loop self monitoring. Patient has RN's contact information.

## 2020-12-15 ENCOUNTER — PATIENT OUTREACH (OUTPATIENT)
Dept: CASE MANAGEMENT | Age: 45
End: 2020-12-15

## 2021-01-15 ENCOUNTER — HOSPITAL ENCOUNTER (EMERGENCY)
Age: 46
Discharge: HOME | End: 2021-01-15
Attending: EMERGENCY MEDICINE
Payer: COMMERCIAL

## 2021-01-15 VITALS
DIASTOLIC BLOOD PRESSURE: 84 MMHG | HEART RATE: 77 BPM | OXYGEN SATURATION: 100 % | RESPIRATION RATE: 20 BRPM | TEMPERATURE: 99 F | SYSTOLIC BLOOD PRESSURE: 122 MMHG

## 2021-01-15 DIAGNOSIS — T74.21XA SEXUAL ASSAULT OF ADULT, INITIAL ENCOUNTER: Primary | ICD-10-CM

## 2021-01-15 LAB
ALBUMIN SERPL-MCNC: 4 G/DL (ref 3.5–5)
ALBUMIN/GLOB SERPL: 1.1 {RATIO} (ref 1.1–2.2)
ALP SERPL-CCNC: 80 U/L (ref 45–117)
ALT SERPL-CCNC: 16 U/L (ref 12–78)
ANION GAP SERPL CALC-SCNC: 4 MMOL/L (ref 5–15)
AST SERPL-CCNC: 7 U/L (ref 15–37)
BASOPHILS # BLD: 0 K/UL (ref 0–0.1)
BASOPHILS NFR BLD: 1 % (ref 0–1)
BILIRUB SERPL-MCNC: 0.8 MG/DL (ref 0.2–1)
BUN SERPL-MCNC: 13 MG/DL (ref 6–20)
BUN/CREAT SERPL: 14 (ref 12–20)
CALCIUM SERPL-MCNC: 9.4 MG/DL (ref 8.5–10.1)
CHLORIDE SERPL-SCNC: 109 MMOL/L (ref 97–108)
CLUE CELLS VAG QL WET PREP: NORMAL
CO2 SERPL-SCNC: 26 MMOL/L (ref 21–32)
CREAT SERPL-MCNC: 0.95 MG/DL (ref 0.55–1.02)
DIFFERENTIAL METHOD BLD: NORMAL
EOSINOPHIL # BLD: 0 K/UL (ref 0–0.4)
EOSINOPHIL NFR BLD: 1 % (ref 0–7)
ERYTHROCYTE [DISTWIDTH] IN BLOOD BY AUTOMATED COUNT: 13.6 % (ref 11.5–14.5)
GLOBULIN SER CALC-MCNC: 3.6 G/DL (ref 2–4)
GLUCOSE SERPL-MCNC: 87 MG/DL (ref 65–100)
HBV CORE IGM SER QL: 0.11
HBV SURFACE AB SER QL: REACTIVE
HBV SURFACE AB SER-ACNC: >1000 MIU/ML
HBV SURFACE AG SER QL: <0.1 INDEX
HBV SURFACE AG SER QL: NEGATIVE
HCG SERPL QL: NEGATIVE
HCT VFR BLD AUTO: 36.9 % (ref 35–47)
HCV AB SERPL QL IA: NONREACTIVE
HCV COMMENT,HCGAC: NORMAL
HGB BLD-MCNC: 12.3 G/DL (ref 11.5–16)
HIV1 P24 AG SERPL QL IA: NONREACTIVE
HIV1+2 AB SERPL QL IA: NONREACTIVE
IMM GRANULOCYTES # BLD AUTO: 0 K/UL (ref 0–0.04)
IMM GRANULOCYTES NFR BLD AUTO: 0 % (ref 0–0.5)
KOH PREP SPEC: NORMAL
LYMPHOCYTES # BLD: 1.5 K/UL (ref 0.8–3.5)
LYMPHOCYTES NFR BLD: 24 % (ref 12–49)
MCH RBC QN AUTO: 30.2 PG (ref 26–34)
MCHC RBC AUTO-ENTMCNC: 33.3 G/DL (ref 30–36.5)
MCV RBC AUTO: 90.7 FL (ref 80–99)
MONOCYTES # BLD: 0.4 K/UL (ref 0–1)
MONOCYTES NFR BLD: 7 % (ref 5–13)
NEUTS SEG # BLD: 4.1 K/UL (ref 1.8–8)
NEUTS SEG NFR BLD: 67 % (ref 32–75)
NRBC # BLD: 0 K/UL (ref 0–0.01)
NRBC BLD-RTO: 0 PER 100 WBC
PLATELET # BLD AUTO: 264 K/UL (ref 150–400)
PMV BLD AUTO: 9.6 FL (ref 8.9–12.9)
POTASSIUM SERPL-SCNC: 3.4 MMOL/L (ref 3.5–5.1)
PROT SERPL-MCNC: 7.6 G/DL (ref 6.4–8.2)
RBC # BLD AUTO: 4.07 M/UL (ref 3.8–5.2)
RPR SER QL: NONREACTIVE
SERVICE CMNT-IMP: NORMAL
SODIUM SERPL-SCNC: 139 MMOL/L (ref 136–145)
T VAGINALIS VAG QL WET PREP: NORMAL
WBC # BLD AUTO: 6.1 K/UL (ref 3.6–11)

## 2021-01-15 PROCEDURE — 99284 EMERGENCY DEPT VISIT MOD MDM: CPT

## 2021-01-15 PROCEDURE — 87210 SMEAR WET MOUNT SALINE/INK: CPT

## 2021-01-15 PROCEDURE — 85025 COMPLETE CBC W/AUTO DIFF WBC: CPT

## 2021-01-15 PROCEDURE — 86705 HEP B CORE ANTIBODY IGM: CPT

## 2021-01-15 PROCEDURE — 84703 CHORIONIC GONADOTROPIN ASSAY: CPT

## 2021-01-15 PROCEDURE — 75810000275 HC EMERGENCY DEPT VISIT NO LEVEL OF CARE

## 2021-01-15 PROCEDURE — 36415 COLL VENOUS BLD VENIPUNCTURE: CPT

## 2021-01-15 PROCEDURE — 86803 HEPATITIS C AB TEST: CPT

## 2021-01-15 PROCEDURE — 86706 HEP B SURFACE ANTIBODY: CPT

## 2021-01-15 PROCEDURE — 74011250637 HC RX REV CODE- 250/637: Performed by: PHYSICIAN ASSISTANT

## 2021-01-15 PROCEDURE — 86592 SYPHILIS TEST NON-TREP QUAL: CPT

## 2021-01-15 PROCEDURE — 87340 HEPATITIS B SURFACE AG IA: CPT

## 2021-01-15 PROCEDURE — 80053 COMPREHEN METABOLIC PANEL: CPT

## 2021-01-15 PROCEDURE — 87389 HIV-1 AG W/HIV-1&-2 AB AG IA: CPT

## 2021-01-15 RX ORDER — LEVONORGESTREL 1.5 MG/1
1.5 TABLET ORAL ONCE
Status: COMPLETED | OUTPATIENT
Start: 2021-01-15 | End: 2021-01-15

## 2021-01-15 RX ORDER — ONDANSETRON 4 MG/1
4 TABLET, ORALLY DISINTEGRATING ORAL
Qty: 35 TAB | Refills: 0 | Status: SHIPPED | OUTPATIENT
Start: 2021-01-15

## 2021-01-15 RX ORDER — ONDANSETRON 4 MG/1
4 TABLET, ORALLY DISINTEGRATING ORAL
Status: COMPLETED | OUTPATIENT
Start: 2021-01-15 | End: 2021-01-15

## 2021-01-15 RX ORDER — EMTRICITABINE AND TENOFOVIR DISOPROXIL FUMARATE 200; 300 MG/1; MG/1
1 TABLET, FILM COATED ORAL DAILY
Status: DISCONTINUED | OUTPATIENT
Start: 2021-01-15 | End: 2021-01-15 | Stop reason: HOSPADM

## 2021-01-15 RX ORDER — EMTRICITABINE AND TENOFOVIR DISOPROXIL FUMARATE 200; 300 MG/1; MG/1
1 TABLET, FILM COATED ORAL DAILY
Qty: 28 TAB | Refills: 0 | Status: SHIPPED | OUTPATIENT
Start: 2021-01-15 | End: 2021-02-12

## 2021-01-15 RX ADMIN — ONDANSETRON 4 MG: 4 TABLET, ORALLY DISINTEGRATING ORAL at 11:26

## 2021-01-15 RX ADMIN — RALTEGRAVIR 400 MG: 400 TABLET, FILM COATED ORAL at 11:26

## 2021-01-15 RX ADMIN — LEVONORGESTREL 1.5 MG: 1.5 TABLET ORAL at 11:26

## 2021-01-15 RX ADMIN — EMTRICITABINE AND TENOFOVIR DISOPROXIL FUMARATE 1 TABLET: 200; 300 TABLET, FILM COATED ORAL at 11:26
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
## 2021-11-28 ENCOUNTER — APPOINTMENT (OUTPATIENT)
Dept: CT IMAGING | Age: 46
End: 2021-11-28
Attending: EMERGENCY MEDICINE
Payer: COMMERCIAL

## 2021-11-28 ENCOUNTER — APPOINTMENT (OUTPATIENT)
Dept: GENERAL RADIOLOGY | Age: 46
End: 2021-11-28
Attending: EMERGENCY MEDICINE
Payer: COMMERCIAL

## 2021-11-28 ENCOUNTER — HOSPITAL ENCOUNTER (EMERGENCY)
Age: 46
Discharge: HOME | End: 2021-11-28
Attending: EMERGENCY MEDICINE
Payer: COMMERCIAL

## 2021-11-28 VITALS
BODY MASS INDEX: 27.86 KG/M2 | HEART RATE: 66 BPM | RESPIRATION RATE: 18 BRPM | TEMPERATURE: 98.8 F | OXYGEN SATURATION: 98 % | DIASTOLIC BLOOD PRESSURE: 101 MMHG | HEIGHT: 70 IN | WEIGHT: 194.6 LBS | SYSTOLIC BLOOD PRESSURE: 138 MMHG

## 2021-11-28 DIAGNOSIS — R55 VASOVAGAL SYNCOPE: ICD-10-CM

## 2021-11-28 DIAGNOSIS — R07.9 ACUTE CHEST PAIN: Primary | ICD-10-CM

## 2021-11-28 LAB
ALBUMIN SERPL-MCNC: 3.8 G/DL (ref 3.5–5)
ALBUMIN/GLOB SERPL: 1.1 {RATIO} (ref 1.1–2.2)
ALP SERPL-CCNC: 78 U/L (ref 45–117)
ALT SERPL-CCNC: 28 U/L (ref 12–78)
ANION GAP SERPL CALC-SCNC: 4 MMOL/L (ref 5–15)
APPEARANCE UR: CLEAR
AST SERPL-CCNC: 18 U/L (ref 15–37)
BASOPHILS # BLD: 0 K/UL (ref 0–0.1)
BASOPHILS NFR BLD: 1 % (ref 0–1)
BILIRUB SERPL-MCNC: 0.5 MG/DL (ref 0.2–1)
BILIRUB UR QL: NEGATIVE
BNP SERPL-MCNC: 29 PG/ML
BUN SERPL-MCNC: 16 MG/DL (ref 6–20)
BUN/CREAT SERPL: 16 (ref 12–20)
CALCIUM SERPL-MCNC: 9.6 MG/DL (ref 8.5–10.1)
CHLORIDE SERPL-SCNC: 108 MMOL/L (ref 97–108)
CO2 SERPL-SCNC: 25 MMOL/L (ref 21–32)
COLOR UR: ABNORMAL
CREAT SERPL-MCNC: 1.01 MG/DL (ref 0.55–1.02)
D DIMER PPP FEU-MCNC: 0.77 MG/L FEU (ref 0–0.65)
DIFFERENTIAL METHOD BLD: NORMAL
EOSINOPHIL # BLD: 0.1 K/UL (ref 0–0.4)
EOSINOPHIL NFR BLD: 1 % (ref 0–7)
ERYTHROCYTE [DISTWIDTH] IN BLOOD BY AUTOMATED COUNT: 13.5 % (ref 11.5–14.5)
GLOBULIN SER CALC-MCNC: 3.6 G/DL (ref 2–4)
GLUCOSE SERPL-MCNC: 87 MG/DL (ref 65–100)
GLUCOSE UR STRIP.AUTO-MCNC: NEGATIVE MG/DL
HCG UR QL: NEGATIVE
HCT VFR BLD AUTO: 38.6 % (ref 35–47)
HGB BLD-MCNC: 12.6 G/DL (ref 11.5–16)
HGB UR QL STRIP: NEGATIVE
IMM GRANULOCYTES # BLD AUTO: 0 K/UL (ref 0–0.04)
IMM GRANULOCYTES NFR BLD AUTO: 0 % (ref 0–0.5)
KETONES UR QL STRIP.AUTO: NEGATIVE MG/DL
LEUKOCYTE ESTERASE UR QL STRIP.AUTO: NEGATIVE
LYMPHOCYTES # BLD: 2.4 K/UL (ref 0.8–3.5)
LYMPHOCYTES NFR BLD: 32 % (ref 12–49)
MCH RBC QN AUTO: 30.1 PG (ref 26–34)
MCHC RBC AUTO-ENTMCNC: 32.6 G/DL (ref 30–36.5)
MCV RBC AUTO: 92.1 FL (ref 80–99)
MONOCYTES # BLD: 0.5 K/UL (ref 0–1)
MONOCYTES NFR BLD: 7 % (ref 5–13)
NEUTS SEG # BLD: 4.4 K/UL (ref 1.8–8)
NEUTS SEG NFR BLD: 59 % (ref 32–75)
NITRITE UR QL STRIP.AUTO: NEGATIVE
NRBC # BLD: 0 K/UL (ref 0–0.01)
NRBC BLD-RTO: 0 PER 100 WBC
PH UR STRIP: 5.5 [PH] (ref 5–8)
PLATELET # BLD AUTO: 182 K/UL (ref 150–400)
PMV BLD AUTO: 10.2 FL (ref 8.9–12.9)
POTASSIUM SERPL-SCNC: 3.9 MMOL/L (ref 3.5–5.1)
PROT SERPL-MCNC: 7.4 G/DL (ref 6.4–8.2)
PROT UR STRIP-MCNC: NEGATIVE MG/DL
RBC # BLD AUTO: 4.19 M/UL (ref 3.8–5.2)
SODIUM SERPL-SCNC: 137 MMOL/L (ref 136–145)
SP GR UR REFRACTOMETRY: >1.03 (ref 1–1.03)
TROPONIN-HIGH SENSITIVITY: <4 NG/L (ref 0–51)
UROBILINOGEN UR QL STRIP.AUTO: 0.2 EU/DL (ref 0.2–1)
WBC # BLD AUTO: 7.5 K/UL (ref 3.6–11)

## 2021-11-28 PROCEDURE — 96374 THER/PROPH/DIAG INJ IV PUSH: CPT

## 2021-11-28 PROCEDURE — 81025 URINE PREGNANCY TEST: CPT

## 2021-11-28 PROCEDURE — 85379 FIBRIN DEGRADATION QUANT: CPT

## 2021-11-28 PROCEDURE — 84484 ASSAY OF TROPONIN QUANT: CPT

## 2021-11-28 PROCEDURE — 74011250636 HC RX REV CODE- 250/636: Performed by: EMERGENCY MEDICINE

## 2021-11-28 PROCEDURE — 71045 X-RAY EXAM CHEST 1 VIEW: CPT

## 2021-11-28 PROCEDURE — 93005 ELECTROCARDIOGRAM TRACING: CPT

## 2021-11-28 PROCEDURE — 96361 HYDRATE IV INFUSION ADD-ON: CPT

## 2021-11-28 PROCEDURE — 81003 URINALYSIS AUTO W/O SCOPE: CPT

## 2021-11-28 PROCEDURE — 83880 ASSAY OF NATRIURETIC PEPTIDE: CPT

## 2021-11-28 PROCEDURE — 96360 HYDRATION IV INFUSION INIT: CPT

## 2021-11-28 PROCEDURE — 99285 EMERGENCY DEPT VISIT HI MDM: CPT

## 2021-11-28 PROCEDURE — 36415 COLL VENOUS BLD VENIPUNCTURE: CPT

## 2021-11-28 PROCEDURE — 80053 COMPREHEN METABOLIC PANEL: CPT

## 2021-11-28 PROCEDURE — 85025 COMPLETE CBC W/AUTO DIFF WBC: CPT

## 2021-11-28 RX ORDER — KETOROLAC TROMETHAMINE 30 MG/ML
15 INJECTION, SOLUTION INTRAMUSCULAR; INTRAVENOUS ONCE
Status: COMPLETED | OUTPATIENT
Start: 2021-11-28 | End: 2021-11-28

## 2021-11-28 RX ORDER — ACETAMINOPHEN 500 MG
1000 TABLET ORAL ONCE
Status: DISCONTINUED | OUTPATIENT
Start: 2021-11-28 | End: 2021-11-28

## 2021-11-28 RX ORDER — KETOROLAC TROMETHAMINE 10 MG/1
10 TABLET, FILM COATED ORAL
Qty: 30 TABLET | Refills: 0 | Status: SHIPPED | OUTPATIENT
Start: 2021-11-28

## 2021-11-28 RX ORDER — CYCLOBENZAPRINE HCL 10 MG
10 TABLET ORAL
Qty: 30 TABLET | Refills: 0 | Status: SHIPPED | OUTPATIENT
Start: 2021-11-28

## 2021-11-28 RX ADMIN — KETOROLAC TROMETHAMINE 15 MG: 30 INJECTION, SOLUTION INTRAMUSCULAR at 20:17

## 2021-11-28 RX ADMIN — SODIUM CHLORIDE 1000 ML: 9 INJECTION, SOLUTION INTRAVENOUS at 20:17

## 2021-11-28 NOTE — ED NOTES
RN spoke with LAB regarding specimens collected at [facility] by Anuj Bills Lab staff informed RN that patient's labs were not received. Blood specimen recollected by RN and delivered to lab. MD Dulce Tai made aware of delay.

## 2021-11-29 LAB
ATRIAL RATE: 71 BPM
ATRIAL RATE: 77 BPM
CALCULATED P AXIS, ECG09: 51 DEGREES
CALCULATED P AXIS, ECG09: 53 DEGREES
CALCULATED R AXIS, ECG10: 55 DEGREES
CALCULATED R AXIS, ECG10: 74 DEGREES
CALCULATED T AXIS, ECG11: 38 DEGREES
CALCULATED T AXIS, ECG11: 38 DEGREES
DIAGNOSIS, 93000: NORMAL
DIAGNOSIS, 93000: NORMAL
P-R INTERVAL, ECG05: 132 MS
P-R INTERVAL, ECG05: 134 MS
Q-T INTERVAL, ECG07: 360 MS
Q-T INTERVAL, ECG07: 372 MS
QRS DURATION, ECG06: 72 MS
QRS DURATION, ECG06: 76 MS
QTC CALCULATION (BEZET), ECG08: 391 MS
QTC CALCULATION (BEZET), ECG08: 420 MS
VENTRICULAR RATE, ECG03: 71 BPM
VENTRICULAR RATE, ECG03: 77 BPM

## 2021-11-29 NOTE — DISCHARGE INSTRUCTIONS
Recent Results (from the past 168 hour(s))   EKG, 12 LEAD, INITIAL    Collection Time: 11/28/21  5:20 PM   Result Value Ref Range    Ventricular Rate 71 BPM    Atrial Rate 71 BPM    P-R Interval 132 ms    QRS Duration 76 ms    Q-T Interval 360 ms    QTC Calculation (Bezet) 391 ms    Calculated P Axis 53 degrees    Calculated R Axis 74 degrees    Calculated T Axis 38 degrees    Diagnosis       Normal sinus rhythm  Normal ECG  When compared with ECG of 25-NOV-2020 07:46,  No significant change was found     CBC WITH AUTOMATED DIFF    Collection Time: 11/28/21  6:29 PM   Result Value Ref Range    WBC 7.5 3.6 - 11.0 K/uL    RBC 4.19 3.80 - 5.20 M/uL    HGB 12.6 11.5 - 16.0 g/dL    HCT 38.6 35.0 - 47.0 %    MCV 92.1 80.0 - 99.0 FL    MCH 30.1 26.0 - 34.0 PG    MCHC 32.6 30.0 - 36.5 g/dL    RDW 13.5 11.5 - 14.5 %    PLATELET 160 450 - 946 K/uL    MPV 10.2 8.9 - 12.9 FL    NRBC 0.0 0  WBC    ABSOLUTE NRBC 0.00 0.00 - 0.01 K/uL    NEUTROPHILS 59 32 - 75 %    LYMPHOCYTES 32 12 - 49 %    MONOCYTES 7 5 - 13 %    EOSINOPHILS 1 0 - 7 %    BASOPHILS 1 0 - 1 %    IMMATURE GRANULOCYTES 0 0.0 - 0.5 %    ABS. NEUTROPHILS 4.4 1.8 - 8.0 K/UL    ABS. LYMPHOCYTES 2.4 0.8 - 3.5 K/UL    ABS. MONOCYTES 0.5 0.0 - 1.0 K/UL    ABS. EOSINOPHILS 0.1 0.0 - 0.4 K/UL    ABS. BASOPHILS 0.0 0.0 - 0.1 K/UL    ABS. IMM.  GRANS. 0.0 0.00 - 0.04 K/UL    DF AUTOMATED     METABOLIC PANEL, COMPREHENSIVE    Collection Time: 11/28/21  6:29 PM   Result Value Ref Range    Sodium 137 136 - 145 mmol/L    Potassium 3.9 3.5 - 5.1 mmol/L    Chloride 108 97 - 108 mmol/L    CO2 25 21 - 32 mmol/L    Anion gap 4 (L) 5 - 15 mmol/L    Glucose 87 65 - 100 mg/dL    BUN 16 6 - 20 MG/DL    Creatinine 1.01 0.55 - 1.02 MG/DL    BUN/Creatinine ratio 16 12 - 20      GFR est AA >60 >60 ml/min/1.73m2    GFR est non-AA 59 (L) >60 ml/min/1.73m2    Calcium 9.6 8.5 - 10.1 MG/DL    Bilirubin, total 0.5 0.2 - 1.0 MG/DL    ALT (SGPT) 28 12 - 78 U/L    AST (SGOT) 18 15 - 37 U/L Alk. phosphatase 78 45 - 117 U/L    Protein, total 7.4 6.4 - 8.2 g/dL    Albumin 3.8 3.5 - 5.0 g/dL    Globulin 3.6 2.0 - 4.0 g/dL    A-G Ratio 1.1 1.1 - 2.2     TROPONIN-HIGH SENSITIVITY    Collection Time: 11/28/21  6:29 PM   Result Value Ref Range    Troponin-High Sensitivity <4 0 - 51 ng/L   D DIMER    Collection Time: 11/28/21  6:29 PM   Result Value Ref Range    D-dimer 0.77 (H) 0.00 - 0.65 mg/L FEU   NT-PRO BNP    Collection Time: 11/28/21  6:29 PM   Result Value Ref Range    NT pro-BNP 29 <125 PG/ML   EKG, 12 LEAD, SUBSEQUENT    Collection Time: 11/28/21  8:25 PM   Result Value Ref Range    Ventricular Rate 77 BPM    Atrial Rate 77 BPM    P-R Interval 134 ms    QRS Duration 72 ms    Q-T Interval 372 ms    QTC Calculation (Bezet) 420 ms    Calculated P Axis 51 degrees    Calculated R Axis 55 degrees    Calculated T Axis 38 degrees    Diagnosis       Normal sinus rhythm  Low voltage QRS  When compared with ECG of 28-NOV-2021 17:20,  MANUAL COMPARISON REQUIRED, DATA IS UNCONFIRMED     HCG URINE, QL. - POC    Collection Time: 11/28/21  8:49 PM   Result Value Ref Range    Pregnancy test,urine (POC) Negative NEG

## 2021-11-29 NOTE — ED NOTES
Bedside and Verbal shift change report given to Reanna NICHOLSON (oncoming nurse) by Katharina Martins (offgoing nurse). Report included the following information SBAR, ED Summary and MAR.

## 2021-11-29 NOTE — ED PROVIDER NOTES
EMERGENCY DEPARTMENT HISTORY AND PHYSICAL EXAM      Date: 11/28/2021  Patient Name: Angeles Adams    History of Presenting Illness     Chief Complaint   Patient presents with    Chest Pain     chest pain that started earlier today while at work, EMS was called and reported that she had passed out    Dizziness       History Provided By: Patient    HPI: Angeles Adams, 39 y.o. 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 Patient reports initially happened with a turning motion but then again happened when she was driving straight. She reports towards the end of the drive she had a sudden onset of severe pain, developed blurry vision, and then fainted. She reports this is happened to her during episodes of severe pain in the past.  She denies any cough or fevers. She did report yesterday having some significant cramps in her legs when she woke up but that is resolved since. There is no discrete swelling to her legs. No history of known cardiac disease in the past.  No other associated symptoms. No other exacerbating ameliorating factors. There are no other complaints, changes, or physical findings at this time. PCP: Harini Spring MD    No current facility-administered medications on file prior to encounter. Current Outpatient Medications on File Prior to Encounter   Medication Sig Dispense Refill    ondansetron (Zofran ODT) 4 mg disintegrating tablet Take 1 Tab by mouth every eight (8) hours as needed for Nausea. 35 Tab 0    fluticasone propionate (FLOVENT HFA) 220 mcg/actuation inhaler Take 1 Puff by inhalation two (2) times a day.  3 Inhaler 1    albuterol (PROVENTIL HFA, VENTOLIN HFA, PROAIR HFA) 90 mcg/actuation inhaler Take 2 Puffs by inhalation every four (4) hours as needed for Wheezing or Shortness of Breath. Ok to sub any brand 1 Inhaler 3    albuterol (PROVENTIL VENTOLIN) 2.5 mg /3 mL (0.083 %) nebu 3 mL by Nebulization route every four (4) hours as needed for Wheezing or Shortness of Breath. 25 Each 3    OTHER Needs new mask/tubing for nebulizer; (J45.40) Moderate persistent asthma without complication 1 Each 0    medroxyprogesterone acetate (DEPO-PROVERA IM) by IntraMUSCular route. Past History     Past Medical History:  Past Medical History:   Diagnosis Date    Anemia     Asthma     Fall 2006    fall at work and injured back    Hypothyroidism     hypothyriod     Vitamin D deficiency        Past Surgical History:  Past Surgical History:   Procedure Laterality Date    HX HERNIA REPAIR  10/07/2016    open ventral hernia repair by Dr. Peter Mcconnell.  HX HERNIA REPAIR  10/07/2016    open ventral hernia repair by Dr Daniel Hayward Right 1/2015    had to rebrake because u=it healed wrong       Family History:  Family History   Problem Relation Age of Onset    Heart Disease Maternal Grandmother     Stroke Maternal Grandmother        Social History:  Social History     Tobacco Use    Smoking status: Never Smoker    Smokeless tobacco: Never Used   Substance Use Topics    Alcohol use: Yes     Alcohol/week: 0.0 standard drinks     Comment: rare    Drug use: No       Allergies: Allergies   Allergen Reactions    Latex Swelling    Latex Rash    Onion Anaphylaxis    Oxycodone Anaphylaxis and Shortness of Breath    Tomato Anaphylaxis    Egg Rash    Hydrocodone Shortness of Breath and Itching    Mushroom Rash    Shellfish Derived Swelling         Review of Systems   Review of Systems   Constitutional: Negative for chills, diaphoresis, fatigue and fever. HENT: Negative for ear pain and sore throat. Eyes: Negative for pain and redness. Respiratory: Negative for cough and shortness of breath.     Cardiovascular: Positive for chest pain. Negative for leg swelling. Gastrointestinal: Negative for abdominal pain, diarrhea, nausea and vomiting. Endocrine: Negative for cold intolerance and heat intolerance. Genitourinary: Negative for flank pain and hematuria. Musculoskeletal: Negative for back pain and neck stiffness. Skin: Negative for rash and wound. Neurological: Negative for dizziness, syncope and headaches. All other systems reviewed and are negative. Physical Exam   Physical Exam  Vitals and nursing note reviewed. Constitutional:       General: She is not in acute distress. Appearance: She is well-developed. HENT:      Head: Normocephalic and atraumatic. Mouth/Throat:      Pharynx: No oropharyngeal exudate. Eyes:      Conjunctiva/sclera: Conjunctivae normal.      Pupils: Pupils are equal, round, and reactive to light. Cardiovascular:      Rate and Rhythm: Normal rate and regular rhythm. Heart sounds: No murmur heard. Pulmonary:      Effort: Pulmonary effort is normal. No tachypnea, bradypnea, accessory muscle usage or respiratory distress. Breath sounds: Normal breath sounds. No decreased breath sounds, wheezing, rhonchi or rales. Chest:      Chest wall: Tenderness present. Abdominal:      General: Bowel sounds are normal. There is no distension. Palpations: Abdomen is soft. Tenderness: There is no abdominal tenderness. Musculoskeletal:         General: No deformity. Normal range of motion. Cervical back: Normal range of motion. Skin:     General: Skin is warm and dry. Findings: No rash. Neurological:      Mental Status: She is alert and oriented to person, place, and time.       Coordination: Coordination normal.   Psychiatric:         Behavior: Behavior normal.         Diagnostic Study Results     Labs -     Recent Results (from the past 24 hour(s))   EKG, 12 LEAD, INITIAL    Collection Time: 11/28/21  5:20 PM   Result Value Ref Range Ventricular Rate 71 BPM    Atrial Rate 71 BPM    P-R Interval 132 ms    QRS Duration 76 ms    Q-T Interval 360 ms    QTC Calculation (Bezet) 391 ms    Calculated P Axis 53 degrees    Calculated R Axis 74 degrees    Calculated T Axis 38 degrees    Diagnosis       Normal sinus rhythm  Normal ECG  When compared with ECG of 25-NOV-2020 07:46,  No significant change was found     CBC WITH AUTOMATED DIFF    Collection Time: 11/28/21  6:29 PM   Result Value Ref Range    WBC 7.5 3.6 - 11.0 K/uL    RBC 4.19 3.80 - 5.20 M/uL    HGB 12.6 11.5 - 16.0 g/dL    HCT 38.6 35.0 - 47.0 %    MCV 92.1 80.0 - 99.0 FL    MCH 30.1 26.0 - 34.0 PG    MCHC 32.6 30.0 - 36.5 g/dL    RDW 13.5 11.5 - 14.5 %    PLATELET 134 528 - 547 K/uL    MPV 10.2 8.9 - 12.9 FL    NRBC 0.0 0  WBC    ABSOLUTE NRBC 0.00 0.00 - 0.01 K/uL    NEUTROPHILS 59 32 - 75 %    LYMPHOCYTES 32 12 - 49 %    MONOCYTES 7 5 - 13 %    EOSINOPHILS 1 0 - 7 %    BASOPHILS 1 0 - 1 %    IMMATURE GRANULOCYTES 0 0.0 - 0.5 %    ABS. NEUTROPHILS 4.4 1.8 - 8.0 K/UL    ABS. LYMPHOCYTES 2.4 0.8 - 3.5 K/UL    ABS. MONOCYTES 0.5 0.0 - 1.0 K/UL    ABS. EOSINOPHILS 0.1 0.0 - 0.4 K/UL    ABS. BASOPHILS 0.0 0.0 - 0.1 K/UL    ABS. IMM. GRANS. 0.0 0.00 - 0.04 K/UL    DF AUTOMATED     METABOLIC PANEL, COMPREHENSIVE    Collection Time: 11/28/21  6:29 PM   Result Value Ref Range    Sodium 137 136 - 145 mmol/L    Potassium 3.9 3.5 - 5.1 mmol/L    Chloride 108 97 - 108 mmol/L    CO2 25 21 - 32 mmol/L    Anion gap 4 (L) 5 - 15 mmol/L    Glucose 87 65 - 100 mg/dL    BUN 16 6 - 20 MG/DL    Creatinine 1.01 0.55 - 1.02 MG/DL    BUN/Creatinine ratio 16 12 - 20      GFR est AA >60 >60 ml/min/1.73m2    GFR est non-AA 59 (L) >60 ml/min/1.73m2    Calcium 9.6 8.5 - 10.1 MG/DL    Bilirubin, total 0.5 0.2 - 1.0 MG/DL    ALT (SGPT) 28 12 - 78 U/L    AST (SGOT) 18 15 - 37 U/L    Alk.  phosphatase 78 45 - 117 U/L    Protein, total 7.4 6.4 - 8.2 g/dL    Albumin 3.8 3.5 - 5.0 g/dL    Globulin 3.6 2.0 - 4.0 g/dL A-G Ratio 1.1 1.1 - 2.2     TROPONIN-HIGH SENSITIVITY    Collection Time: 11/28/21  6:29 PM   Result Value Ref Range    Troponin-High Sensitivity <4 0 - 51 ng/L   D DIMER    Collection Time: 11/28/21  6:29 PM   Result Value Ref Range    D-dimer 0.77 (H) 0.00 - 0.65 mg/L FEU   NT-PRO BNP    Collection Time: 11/28/21  6:29 PM   Result Value Ref Range    NT pro-BNP 29 <125 PG/ML   EKG, 12 LEAD, SUBSEQUENT    Collection Time: 11/28/21  8:25 PM   Result Value Ref Range    Ventricular Rate 77 BPM    Atrial Rate 77 BPM    P-R Interval 134 ms    QRS Duration 72 ms    Q-T Interval 372 ms    QTC Calculation (Bezet) 420 ms    Calculated P Axis 51 degrees    Calculated R Axis 55 degrees    Calculated T Axis 38 degrees    Diagnosis       Normal sinus rhythm  Low voltage QRS  When compared with ECG of 28-NOV-2021 17:20,  MANUAL COMPARISON REQUIRED, DATA IS UNCONFIRMED     HCG URINE, QL. - POC    Collection Time: 11/28/21  8:49 PM   Result Value Ref Range    Pregnancy test,urine (POC) Negative NEG     URINALYSIS W/ RFLX MICROSCOPIC    Collection Time: 11/28/21  9:15 PM   Result Value Ref Range    Color YELLOW/STRAW      Appearance CLEAR CLEAR      Specific gravity >1.030 (H) 1.003 - 1.030    pH (UA) 5.5 5.0 - 8.0      Protein Negative NEG mg/dL    Glucose Negative NEG mg/dL    Ketone Negative NEG mg/dL    Bilirubin Negative NEG      Blood Negative NEG      Urobilinogen 0.2 0.2 - 1.0 EU/dL    Nitrites Negative NEG      Leukocyte Esterase Negative NEG         Radiologic Studies -   XR CHEST PORT   Final Result   Normal chest.        CT Results  (Last 48 hours)    None        CXR Results  (Last 48 hours)               11/28/21 1744  XR CHEST PORT Final result    Impression:  Normal chest.       Narrative:  EXAM: XR CHEST PORT       INDICATION: Shortness of breath       COMPARISON: 11/25/2020       FINDINGS: A portable AP radiograph of the chest was obtained at 1735 hours. The   lungs are clear.  The cardiac and mediastinal contours and pulmonary vascularity   are normal.  The bones and soft tissues are grossly within normal limits. Medical Decision Making   I am the first provider for this patient. I reviewed the vital signs, available nursing notes, past medical history, past surgical history, family history and social history. Vital Signs-Reviewed the patient's vital signs. Patient Vitals for the past 12 hrs:   Temp Pulse Resp BP SpO2   11/28/21 2005    (!) 138/101 98 %   11/28/21 1950    (!) 125/98 98 %   11/28/21 1935  Elvia Barros (!) 130/106    11/28/21 1920    (!) 130/91 96 %   11/28/21 1905    (!) 131/96 98 %   11/28/21 1830 98.8 °F (37.1 °C) 66  (!) 127/90 99 %   11/28/21 1708 98.2 °F (36.8 °C) 73 18 (!) 141/78 100 %       Records Reviewed: Nursing records and medical records reviewed    MDM:  Patient presents with CP. DDx:  ACS, Aortic dissection, PNA, PE, PTX, pericarditis, myocarditis, GERD, costochondritis, anxiety. Provider Notes (Medical Decision Making):   Patient is a 17-year-old female presenting with an acute onset of substernal chest pain. This chest pain eventually led to her having a fainting episode. The pain has been intermittent and seems to be exacerbated with movement and direct palpation. It is pleuritic in nature. Her EKG on both measurements shows no evidence of acute ischemia and her high-sensitivity troponin is negative after greater than 3 hours of onset of symptoms. Patient did experience significant delays due to hemolysis of lab and other confounding factors during her stay. As mentioned below, patient had significant concerns about the CAT scan as it related to cost.  I did express that because of her elevated D-dimer and the pleuritic chest pain that we would like to rule out a pulmonary embolism today, she feels strongly that she feels better and would like to be seen by her doctor tomorrow to have the same test ordered through him.   She does state if she felt worse she would stay and have it and she would be open to changing her mind if symptoms were to change in any way. Given the highly reproducible nature of her chest pain, its intermittent nature, and her normal heart rate and pulse oximeter, my suspicion for clinically significant pulmonary embolism is quite low. ED Course:   Initial assessment performed. The patients presenting problems have been discussed, and they are in agreement with the care plan formulated and outlined with them. I have encouraged them to ask questions as they arise throughout their visit. ED Course as of 11/28/21 2159   Alisha Early Nov 28, 2021 2055 Long discussion with patient and her son in regards to patient's diagnostic work-up today. Patient and family discussed her concerns about delays in care and I apologize for these delays. We will repeat EKG to ensure there are no changes which I think is very reasonable. The patient expressed some concerns in regards to the cost of her CT scan and feels strongly that she would prefer to do this through her primary care doctor. I discussed the reasoning and rationale behind doing it tonight, and explained the risks related to her presentation in regards to blood clots. That said, we will continue to monitor her oxygen and heart rate through the pulse oximeter which has been normal throughout her stay. I am also going to have the charge nurse discuss some of the patient's concerns relating to her care today [CC]   2058 EKG interpretation: (Preliminary)  Rhythm: normal sinus rhythm; and regular . Rate (approx.): 77;  Axis: normal; P wave: normal; QRS interval: normal ; ST/T wave: normal;    [CC]      ED Course User Index  [CC] Drea Pratt MD       Medications Administered     ketorolac (TORADOL) injection 15 mg     Admin Date  11/28/2021 Action  Given Dose  15 mg Route  IntraVENous Administered By  Edwin Garcia          sodium chloride 0.9 % bolus infusion 1,000 mL Admin Date  11/28/2021 Action  New Bag Dose  1,000 mL Rate  1,000 mL/hr Route  IntraVENous Administered By  Deisy Zuniga                    Critical Care:  None      Disposition:  10:03 PM  Colletta R Jones's  results have been reviewed with her. She has been counseled regarding her diagnosis. She verbally conveys understanding and agreement of the signs, symptoms, diagnosis, treatment and prognosis and additionally agrees to follow up as recommended with Dr. Macey Castellanos MD in 24 - 48 hours. She also agrees with the care-plan and conveys that all of her questions have been answered. I have also put together some discharge instructions for her that include: 1) educational information regarding their diagnosis, 2) how to care for their diagnosis at home, as well a 3) list of reasons why they would want to return to the ED prior to their follow-up appointment, should their condition change. DISCHARGE PLAN:  1. Current Discharge Medication List      START taking these medications    Details   ketorolac (TORADOL) 10 mg tablet Take 1 Tablet by mouth every six (6) hours as needed for Pain. Qty: 30 Tablet, Refills: 0  Start date: 11/28/2021      cyclobenzaprine (FLEXERIL) 10 mg tablet Take 1 Tablet by mouth three (3) times daily (with meals). Qty: 30 Tablet, Refills: 0  Start date: 11/28/2021           2. Follow-up Information     Follow up With Specialties Details Why Danielito Coe MD Internal Medicine In 1 day For a follow-up evaluation. You will need to have a CAT scan performed of the lung if we plan to have tonight. This can be arranged by your primary care doctor. [facility]  [address] [facility] EMERGENCY DEPT Emergency Medicine In 1 day If symptoms worsen to include worsening chest pain, trouble breathing, or if you change your mind about having an emergent CAT scan performed.  [address] [facility] [address]  [phone]        3. Return to ED if worse     Diagnosis     Clinical Impression:   1. Acute chest pain    2. Vasovagal syncope        Attestations:    Jonas Delacruz MD    Please note that this dictation was completed with [employer], the computer voice recognition software. Quite often unanticipated grammatical, syntax, homophones, and other interpretive errors are inadvertently transcribed by the computer software. Please disregard these errors. Please excuse any errors that have escaped final proofreading. Thank you.

## 2022-03-18 PROBLEM — G43.009 MIGRAINE WITHOUT AURA AND WITHOUT STATUS MIGRAINOSUS, NOT INTRACTABLE: Status: ACTIVE | Noted: 2018-01-09

## 2022-03-18 PROBLEM — N92.6 IRREGULAR MENSTRUAL CYCLE: Status: ACTIVE | Noted: 2018-01-10

## 2022-03-19 PROBLEM — F43.20 ADULT SITUATIONAL STRESS DISORDER: Status: ACTIVE | Noted: 2018-01-10

## 2022-03-19 PROBLEM — R25.2 MUSCLE CRAMPS: Status: ACTIVE | Noted: 2018-01-09

## 2022-09-20 ENCOUNTER — TRANSCRIBE ORDER (OUTPATIENT)
Dept: SCHEDULING | Age: 47
End: 2022-09-20

## 2022-09-20 DIAGNOSIS — Z12.31 SCREENING MAMMOGRAM FOR HIGH-RISK PATIENT: Primary | ICD-10-CM

## 2022-09-23 ENCOUNTER — HOSPITAL ENCOUNTER (OUTPATIENT)
Dept: MAMMOGRAPHY | Age: 47
Discharge: HOME | End: 2022-09-23
Attending: SPECIALIST
Payer: COMMERCIAL

## 2022-09-23 DIAGNOSIS — Z12.31 SCREENING MAMMOGRAM FOR HIGH-RISK PATIENT: ICD-10-CM

## 2022-09-23 PROCEDURE — 77067 SCR MAMMO BI INCL CAD: CPT

## 2022-10-07 ENCOUNTER — HOSPITAL ENCOUNTER (OUTPATIENT)
Dept: ULTRASOUND IMAGING | Age: 47
Discharge: HOME | End: 2022-10-07
Attending: SPECIALIST
Payer: COMMERCIAL

## 2022-10-07 ENCOUNTER — HOSPITAL ENCOUNTER (OUTPATIENT)
Dept: MAMMOGRAPHY | Age: 47
Discharge: HOME | End: 2022-10-07
Attending: SPECIALIST
Payer: COMMERCIAL

## 2022-10-07 DIAGNOSIS — R92.8 ABNORMAL MAMMOGRAM: ICD-10-CM

## 2022-10-07 PROCEDURE — 76642 ULTRASOUND BREAST LIMITED: CPT

## 2022-10-07 PROCEDURE — 77061 BREAST TOMOSYNTHESIS UNI: CPT

## 2023-06-05 NOTE — PROGRESS NOTES
Called patient, appointment scheduled.    HISTORY OF PRESENT ILLNESS  Colletta Layla Money is a 39 y.o. female. HPI  Follow up ED visit. Seen yesterday at [facility] ED for acute headache and chest tightness. Records reviewed and summarized as follows:  EKG without acute abnormality. CT head negative. Labs with no acute abnormality. Given toradol and morphine with good effect. Headache now improved. Also diagnosed with acute OM bilateral, put on amoxicillin. BP noted to be elevated in office this evening as well as in ED. No history of HTN. Past Medical History:   Diagnosis Date    Anemia     Asthma     Fall 2006    fall at work and injured back    Hypothyroidism     hypothyriod     Vitamin D deficiency      Patient Active Problem List   Diagnosis Code    Depression F32.9    Asthma J45.909    Acquired hypothyroidism E03.9    Allergic rhinitis due to allergen J30.9     Current Outpatient Prescriptions   Medication Sig    amoxicillin 500 mg tab Take 500 mg by mouth three (3) times daily.  acetaminophen (TYLENOL) 325 mg tablet Take 500 mg by mouth every four (4) hours as needed for Pain.  albuterol (PROVENTIL) 5 mg/mL nebulizer solution 1.25 mg by Nebulization route once.  budesonide-formoterol (SYMBICORT) 80-4.5 mcg/actuation HFAA inhaler Take 2 Puffs by inhalation two (2) times a day. For asthma prevention     No current facility-administered medications for this visit. Social History   Substance Use Topics    Smoking status: Never Smoker    Smokeless tobacco: Never Used    Alcohol use 0.0 oz/week      Comment: Socially. Visit Vitals    BP (!) 130/103 (BP 1 Location: Left arm, BP Patient Position: Sitting)    Pulse 76    Temp 97.9 °F (36.6 °C) (Oral)    Resp 18    Ht 5' 10\" (1.778 m)    Wt 197 lb (89.4 kg)    SpO2 99%    BMI 28.27 kg/m2     Review of Systems   Constitutional: Negative for chills, fever and malaise/fatigue. HENT: Negative for congestion, ear pain and sore throat.     Respiratory: Negative for cough and shortness of breath. Cardiovascular: Negative for chest pain, palpitations and leg swelling. Neurological: Positive for headaches (resolving). Negative for dizziness. All other systems reviewed and are negative. Physical Exam   Constitutional: No distress. HENT:   Right Ear: Tympanic membrane and ear canal normal.   Left Ear: Tympanic membrane and ear canal normal.   Nose: Right sinus exhibits no maxillary sinus tenderness and no frontal sinus tenderness. Left sinus exhibits no maxillary sinus tenderness and no frontal sinus tenderness. Mouth/Throat: Oropharynx is clear and moist.   Neck: Neck supple. Cardiovascular: Normal rate, regular rhythm and normal heart sounds. Pulmonary/Chest: Effort normal and breath sounds normal.   Abdominal: Soft. She exhibits no distension. There is no tenderness. There is no guarding. Musculoskeletal: She exhibits no edema. Lymphadenopathy:     She has no cervical adenopathy. Neurological: She is alert. Skin: Skin is warm and dry. Psychiatric: She has a normal mood and affect. ASSESSMENT and PLAN  Diagnoses and all orders for this visit:    1. Headache, unspecified headache type  Resolving. May be secondary to HTN. 2. Elevated blood pressure reading  Advised beginning blood pressure medication. Patient declined at this time. Educated regarding HTN:  Etiology, treatment options and potential complications of untreated HTN. Reviewed DASH diet, exercise recommendations. 3. Acute otitis media, unspecified laterality, unspecified otitis media type  Clinically resolving. Follow up 1 week to recheck BP.

## 2023-08-15 ENCOUNTER — APPOINTMENT (OUTPATIENT)
Facility: HOSPITAL | Age: 48
End: 2023-08-15
Payer: COMMERCIAL

## 2023-08-15 ENCOUNTER — HOSPITAL ENCOUNTER (EMERGENCY)
Facility: HOSPITAL | Age: 48
Discharge: HOME | End: 2023-08-16
Attending: STUDENT IN AN ORGANIZED HEALTH CARE EDUCATION/TRAINING PROGRAM
Payer: COMMERCIAL

## 2023-08-15 DIAGNOSIS — R56.9 SEIZURE (HCC): Primary | ICD-10-CM

## 2023-08-15 PROCEDURE — 36415 COLL VENOUS BLD VENIPUNCTURE: CPT

## 2023-08-15 PROCEDURE — 6370000000 HC RX 637 (ALT 250 FOR IP): Performed by: STUDENT IN AN ORGANIZED HEALTH CARE EDUCATION/TRAINING PROGRAM

## 2023-08-15 PROCEDURE — 81001 URINALYSIS AUTO W/SCOPE: CPT

## 2023-08-15 PROCEDURE — 84703 CHORIONIC GONADOTROPIN ASSAY: CPT

## 2023-08-15 PROCEDURE — 99284 EMERGENCY DEPT VISIT MOD MDM: CPT

## 2023-08-15 PROCEDURE — 80053 COMPREHEN METABOLIC PANEL: CPT

## 2023-08-15 PROCEDURE — 85025 COMPLETE CBC W/AUTO DIFF WBC: CPT

## 2023-08-15 PROCEDURE — 93005 ELECTROCARDIOGRAM TRACING: CPT | Performed by: STUDENT IN AN ORGANIZED HEALTH CARE EDUCATION/TRAINING PROGRAM

## 2023-08-15 RX ORDER — ACETAMINOPHEN 500 MG
1000 TABLET ORAL
Status: COMPLETED | OUTPATIENT
Start: 2023-08-15 | End: 2023-08-15

## 2023-08-15 RX ADMIN — ACETAMINOPHEN 1000 MG: 500 TABLET ORAL at 23:25

## 2023-08-15 ASSESSMENT — PAIN DESCRIPTION - LOCATION: LOCATION: NECK;SHOULDER

## 2023-08-15 ASSESSMENT — PAIN DESCRIPTION - ORIENTATION: ORIENTATION: LEFT

## 2023-08-15 ASSESSMENT — PAIN - FUNCTIONAL ASSESSMENT: PAIN_FUNCTIONAL_ASSESSMENT: 0-10

## 2023-08-15 ASSESSMENT — PAIN SCALES - GENERAL: PAINLEVEL_OUTOF10: 8

## 2023-08-16 ENCOUNTER — APPOINTMENT (OUTPATIENT)
Facility: HOSPITAL | Age: 48
End: 2023-08-16
Payer: COMMERCIAL

## 2023-08-16 VITALS
DIASTOLIC BLOOD PRESSURE: 80 MMHG | OXYGEN SATURATION: 99 % | SYSTOLIC BLOOD PRESSURE: 123 MMHG | TEMPERATURE: 98.6 F | RESPIRATION RATE: 20 BRPM | HEART RATE: 77 BPM

## 2023-08-16 LAB
ALBUMIN SERPL-MCNC: 3.9 G/DL (ref 3.5–5)
ALBUMIN/GLOB SERPL: 1.1 (ref 1.1–2.2)
ALP SERPL-CCNC: 78 U/L (ref 45–117)
ALT SERPL-CCNC: 15 U/L (ref 12–78)
ANION GAP SERPL CALC-SCNC: 4 MMOL/L (ref 5–15)
APPEARANCE UR: CLEAR
AST SERPL-CCNC: 11 U/L (ref 15–37)
BACTERIA URNS QL MICRO: ABNORMAL /HPF
BASOPHILS # BLD: 0 K/UL (ref 0–0.1)
BASOPHILS NFR BLD: 1 % (ref 0–1)
BILIRUB SERPL-MCNC: 0.5 MG/DL (ref 0.2–1)
BILIRUB UR QL: NEGATIVE
BUN SERPL-MCNC: 17 MG/DL (ref 6–20)
BUN/CREAT SERPL: 16 (ref 12–20)
CALCIUM SERPL-MCNC: 9.4 MG/DL (ref 8.5–10.1)
CHLORIDE SERPL-SCNC: 110 MMOL/L (ref 97–108)
CO2 SERPL-SCNC: 24 MMOL/L (ref 21–32)
COLOR UR: ABNORMAL
CREAT SERPL-MCNC: 1.07 MG/DL (ref 0.55–1.02)
DIFFERENTIAL METHOD BLD: NORMAL
EKG ATRIAL RATE: 77 BPM
EKG DIAGNOSIS: NORMAL
EKG P AXIS: 44 DEGREES
EKG P-R INTERVAL: 138 MS
EKG Q-T INTERVAL: 366 MS
EKG QRS DURATION: 78 MS
EKG QTC CALCULATION (BAZETT): 414 MS
EKG R AXIS: 34 DEGREES
EKG T AXIS: 42 DEGREES
EKG VENTRICULAR RATE: 77 BPM
EOSINOPHIL # BLD: 0.1 K/UL (ref 0–0.4)
EOSINOPHIL NFR BLD: 1 % (ref 0–7)
EPITH CASTS URNS QL MICRO: ABNORMAL /LPF
ERYTHROCYTE [DISTWIDTH] IN BLOOD BY AUTOMATED COUNT: 13.4 % (ref 11.5–14.5)
GLOBULIN SER CALC-MCNC: 3.5 G/DL (ref 2–4)
GLUCOSE SERPL-MCNC: 104 MG/DL (ref 65–100)
GLUCOSE UR STRIP.AUTO-MCNC: NEGATIVE MG/DL
HCG SERPL QL: NEGATIVE
HCT VFR BLD AUTO: 37.1 % (ref 35–47)
HGB BLD-MCNC: 12.6 G/DL (ref 11.5–16)
HGB UR QL STRIP: NEGATIVE
HYALINE CASTS URNS QL MICRO: ABNORMAL /LPF (ref 0–2)
IMM GRANULOCYTES # BLD AUTO: 0 K/UL (ref 0–0.04)
IMM GRANULOCYTES NFR BLD AUTO: 0 % (ref 0–0.5)
KETONES UR QL STRIP.AUTO: NEGATIVE MG/DL
LEUKOCYTE ESTERASE UR QL STRIP.AUTO: NEGATIVE
LYMPHOCYTES # BLD: 2.5 K/UL (ref 0.8–3.5)
LYMPHOCYTES NFR BLD: 35 % (ref 12–49)
MCH RBC QN AUTO: 30.8 PG (ref 26–34)
MCHC RBC AUTO-ENTMCNC: 34 G/DL (ref 30–36.5)
MCV RBC AUTO: 90.7 FL (ref 80–99)
MONOCYTES # BLD: 0.6 K/UL (ref 0–1)
MONOCYTES NFR BLD: 8 % (ref 5–13)
NEUTS SEG # BLD: 4 K/UL (ref 1.8–8)
NEUTS SEG NFR BLD: 55 % (ref 32–75)
NITRITE UR QL STRIP.AUTO: NEGATIVE
NRBC # BLD: 0 K/UL (ref 0–0.01)
NRBC BLD-RTO: 0 PER 100 WBC
PH UR STRIP: 6 (ref 5–8)
PLATELET # BLD AUTO: 242 K/UL (ref 150–400)
PMV BLD AUTO: 9.4 FL (ref 8.9–12.9)
POTASSIUM SERPL-SCNC: 3.7 MMOL/L (ref 3.5–5.1)
PROT SERPL-MCNC: 7.4 G/DL (ref 6.4–8.2)
PROT UR STRIP-MCNC: NEGATIVE MG/DL
RBC # BLD AUTO: 4.09 M/UL (ref 3.8–5.2)
RBC #/AREA URNS HPF: ABNORMAL /HPF (ref 0–5)
SODIUM SERPL-SCNC: 138 MMOL/L (ref 136–145)
SP GR UR REFRACTOMETRY: 1.01
URINE CULTURE IF INDICATED: ABNORMAL
UROBILINOGEN UR QL STRIP.AUTO: 0.2 EU/DL (ref 0.2–1)
WBC # BLD AUTO: 7.2 K/UL (ref 3.6–11)
WBC URNS QL MICRO: ABNORMAL /HPF (ref 0–4)

## 2023-08-16 PROCEDURE — 70450 CT HEAD/BRAIN W/O DYE: CPT

## 2023-08-16 NOTE — DISCHARGE INSTRUCTIONS
You have been evaluated in the Emergency Department today for a seizure. Your evaluation, including labs and a CT of your brain, were unremarkable. Do not drive until you are cleared by a physician. Return to the Emergency Department if you experience recurrent seizures, difficulty walking or moving your arms or legs, slurred speech, difficulty with normal activities, abnormal behavior, vision changes, or for any other concerning symptoms. Thank you for choosing us for your care.

## 2024-11-20 ENCOUNTER — TELEPHONE (OUTPATIENT)
Age: 49
End: 2024-11-20

## 2024-11-20 NOTE — TELEPHONE ENCOUNTER
Called to schedule EMG appt, spoke with pt she stated that she no longer needs it and that she is back at work.

## 2025-05-07 ENCOUNTER — APPOINTMENT (OUTPATIENT)
Facility: HOSPITAL | Age: 50
End: 2025-05-07
Payer: COMMERCIAL

## 2025-05-07 ENCOUNTER — HOSPITAL ENCOUNTER (EMERGENCY)
Facility: HOSPITAL | Age: 50
Discharge: HOME | End: 2025-05-07
Attending: EMERGENCY MEDICINE
Payer: COMMERCIAL

## 2025-05-07 VITALS
RESPIRATION RATE: 13 BRPM | TEMPERATURE: 98.5 F | WEIGHT: 198 LBS | BODY MASS INDEX: 28.41 KG/M2 | DIASTOLIC BLOOD PRESSURE: 105 MMHG | HEART RATE: 69 BPM | OXYGEN SATURATION: 100 % | SYSTOLIC BLOOD PRESSURE: 149 MMHG

## 2025-05-07 DIAGNOSIS — R07.89 ATYPICAL CHEST PAIN: Primary | ICD-10-CM

## 2025-05-07 DIAGNOSIS — R11.2 NAUSEA AND VOMITING, UNSPECIFIED VOMITING TYPE: ICD-10-CM

## 2025-05-07 LAB
ALBUMIN SERPL-MCNC: 3.5 G/DL (ref 3.5–5)
ALBUMIN/GLOB SERPL: 1 (ref 1.1–2.2)
ALP SERPL-CCNC: 83 U/L (ref 45–117)
ALT SERPL-CCNC: 18 U/L (ref 12–78)
ANION GAP SERPL CALC-SCNC: 5 MMOL/L (ref 2–12)
AST SERPL-CCNC: 24 U/L (ref 15–37)
BASOPHILS # BLD: 0.04 K/UL (ref 0–0.1)
BASOPHILS NFR BLD: 0.6 % (ref 0–1)
BILIRUB SERPL-MCNC: 0.8 MG/DL (ref 0.2–1)
BUN SERPL-MCNC: 13 MG/DL (ref 6–20)
BUN/CREAT SERPL: 14 (ref 12–20)
CALCIUM SERPL-MCNC: 9.1 MG/DL (ref 8.5–10.1)
CHLORIDE SERPL-SCNC: 107 MMOL/L (ref 97–108)
CO2 SERPL-SCNC: 26 MMOL/L (ref 21–32)
CREAT SERPL-MCNC: 0.9 MG/DL (ref 0.55–1.02)
DIFFERENTIAL METHOD BLD: NORMAL
EKG ATRIAL RATE: 66 BPM
EKG DIAGNOSIS: NORMAL
EKG P AXIS: 59 DEGREES
EKG P-R INTERVAL: 144 MS
EKG Q-T INTERVAL: 366 MS
EKG QRS DURATION: 74 MS
EKG QTC CALCULATION (BAZETT): 383 MS
EKG R AXIS: 70 DEGREES
EKG T AXIS: 43 DEGREES
EKG VENTRICULAR RATE: 66 BPM
EOSINOPHIL # BLD: 0.05 K/UL (ref 0–0.4)
EOSINOPHIL NFR BLD: 0.7 % (ref 0–7)
ERYTHROCYTE [DISTWIDTH] IN BLOOD BY AUTOMATED COUNT: 14.4 % (ref 11.5–14.5)
GLOBULIN SER CALC-MCNC: 3.4 G/DL (ref 2–4)
GLUCOSE SERPL-MCNC: 88 MG/DL (ref 65–100)
HCG SERPL QL: NEGATIVE
HCT VFR BLD AUTO: 38 % (ref 35–47)
HGB BLD-MCNC: 12.6 G/DL (ref 11.5–16)
IMM GRANULOCYTES # BLD AUTO: 0.02 K/UL (ref 0–0.04)
IMM GRANULOCYTES NFR BLD AUTO: 0.3 % (ref 0–0.5)
LIPASE SERPL-CCNC: 29 U/L (ref 13–75)
LYMPHOCYTES # BLD: 1.51 K/UL (ref 0.8–3.5)
LYMPHOCYTES NFR BLD: 22 % (ref 12–49)
MAGNESIUM SERPL-MCNC: 2.3 MG/DL (ref 1.6–2.4)
MCH RBC QN AUTO: 30.4 PG (ref 26–34)
MCHC RBC AUTO-ENTMCNC: 33.2 G/DL (ref 30–36.5)
MCV RBC AUTO: 91.8 FL (ref 80–99)
MONOCYTES # BLD: 0.45 K/UL (ref 0–1)
MONOCYTES NFR BLD: 6.6 % (ref 5–13)
NEUTS SEG # BLD: 4.79 K/UL (ref 1.8–8)
NEUTS SEG NFR BLD: 69.8 % (ref 32–75)
NRBC # BLD: 0 K/UL (ref 0–0.01)
NRBC BLD-RTO: 0 PER 100 WBC
PLATELET # BLD AUTO: 292 K/UL (ref 150–400)
PMV BLD AUTO: 10.6 FL (ref 8.9–12.9)
POTASSIUM SERPL-SCNC: 4.6 MMOL/L (ref 3.5–5.1)
PROT SERPL-MCNC: 6.9 G/DL (ref 6.4–8.2)
RBC # BLD AUTO: 4.14 M/UL (ref 3.8–5.2)
SODIUM SERPL-SCNC: 138 MMOL/L (ref 136–145)
TROPONIN I SERPL HS-MCNC: 4 NG/L (ref 0–51)
WBC # BLD AUTO: 6.9 K/UL (ref 3.6–11)

## 2025-05-07 PROCEDURE — 71045 X-RAY EXAM CHEST 1 VIEW: CPT

## 2025-05-07 PROCEDURE — 85025 COMPLETE CBC W/AUTO DIFF WBC: CPT

## 2025-05-07 PROCEDURE — 2580000003 HC RX 258: Performed by: EMERGENCY MEDICINE

## 2025-05-07 PROCEDURE — 93005 ELECTROCARDIOGRAM TRACING: CPT | Performed by: EMERGENCY MEDICINE

## 2025-05-07 PROCEDURE — 83690 ASSAY OF LIPASE: CPT

## 2025-05-07 PROCEDURE — 84484 ASSAY OF TROPONIN QUANT: CPT

## 2025-05-07 PROCEDURE — 74177 CT ABD & PELVIS W/CONTRAST: CPT

## 2025-05-07 PROCEDURE — 80053 COMPREHEN METABOLIC PANEL: CPT

## 2025-05-07 PROCEDURE — 83735 ASSAY OF MAGNESIUM: CPT

## 2025-05-07 PROCEDURE — 96374 THER/PROPH/DIAG INJ IV PUSH: CPT

## 2025-05-07 PROCEDURE — 99285 EMERGENCY DEPT VISIT HI MDM: CPT

## 2025-05-07 PROCEDURE — 6360000002 HC RX W HCPCS: Performed by: EMERGENCY MEDICINE

## 2025-05-07 PROCEDURE — 84703 CHORIONIC GONADOTROPIN ASSAY: CPT

## 2025-05-07 PROCEDURE — 6360000004 HC RX CONTRAST MEDICATION: Performed by: EMERGENCY MEDICINE

## 2025-05-07 PROCEDURE — 36415 COLL VENOUS BLD VENIPUNCTURE: CPT

## 2025-05-07 RX ORDER — ONDANSETRON 2 MG/ML
4 INJECTION INTRAMUSCULAR; INTRAVENOUS ONCE
Status: COMPLETED | OUTPATIENT
Start: 2025-05-07 | End: 2025-05-07

## 2025-05-07 RX ORDER — ONDANSETRON 4 MG/1
4 TABLET, ORALLY DISINTEGRATING ORAL 3 TIMES DAILY PRN
Qty: 21 TABLET | Refills: 0 | Status: SHIPPED | OUTPATIENT
Start: 2025-05-07

## 2025-05-07 RX ORDER — IOPAMIDOL 755 MG/ML
100 INJECTION, SOLUTION INTRAVASCULAR
Status: COMPLETED | OUTPATIENT
Start: 2025-05-07 | End: 2025-05-07

## 2025-05-07 RX ORDER — 0.9 % SODIUM CHLORIDE 0.9 %
1000 INTRAVENOUS SOLUTION INTRAVENOUS ONCE
Status: COMPLETED | OUTPATIENT
Start: 2025-05-07 | End: 2025-05-07

## 2025-05-07 RX ADMIN — ONDANSETRON 4 MG: 2 INJECTION, SOLUTION INTRAMUSCULAR; INTRAVENOUS at 10:48

## 2025-05-07 RX ADMIN — SODIUM CHLORIDE 1000 ML: 0.9 INJECTION, SOLUTION INTRAVENOUS at 10:47

## 2025-05-07 RX ADMIN — IOPAMIDOL 100 ML: 755 INJECTION, SOLUTION INTRAVENOUS at 12:02

## 2025-05-07 ASSESSMENT — PAIN SCALES - GENERAL: PAINLEVEL_OUTOF10: 3

## 2025-05-07 ASSESSMENT — PAIN DESCRIPTION - PAIN TYPE: TYPE: ACUTE PAIN

## 2025-05-07 ASSESSMENT — PAIN - FUNCTIONAL ASSESSMENT
PAIN_FUNCTIONAL_ASSESSMENT: 0-10
PAIN_FUNCTIONAL_ASSESSMENT: ACTIVITIES ARE NOT PREVENTED

## 2025-05-07 ASSESSMENT — PAIN DESCRIPTION - FREQUENCY: FREQUENCY: CONTINUOUS

## 2025-05-07 ASSESSMENT — PAIN DESCRIPTION - DESCRIPTORS: DESCRIPTORS: SORE

## 2025-05-07 ASSESSMENT — PAIN DESCRIPTION - LOCATION: LOCATION: CHEST

## 2025-05-07 ASSESSMENT — PAIN DESCRIPTION - ORIENTATION: ORIENTATION: MID

## 2025-05-07 ASSESSMENT — PAIN DESCRIPTION - ONSET: ONSET: GRADUAL

## 2025-05-07 NOTE — ED PROVIDER NOTES
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 A&O x4 at this time. Hx of severe anxiety and asthma. Pt states she's been nauseous and vomiting with lightheadedness that started yesterday. Pt states this same event happened once before when she was exposed to gas fumes from the bus. Denies cardiac hx.         HISTORY OF PRESENT ILLNESS: 1 or more elements      History From: Patient, History limited by: none     Colletta R Jones is a 49 y.o. female presents to the emergency department for patient of chest pain lightheadedness nausea vomiting.       Please See MDM for Additional Details of the HPI/PMH  Nursing Notes were all reviewed and agreed with or any disagreements were addressed in the HPI.     REVIEW OF SYSTEMS        Positives and Pertinent negatives as per HPI.    PAST HISTORY     Past Medical History:  Past Medical History:   Diagnosis Date    Anemia     Asthma     Fall 2006    fall at work and injured back    Hypothyroidism     hypothyriod     Vitamin D deficiency        Past Surgical History:  Past Surgical History:   Procedure Laterality Date    HERNIA REPAIR  10/07/2016    open ventral hernia repair by Dr. South.    HERNIA REPAIR  10/07/2016    open ventral hernia repair by Dr MYRA South    WRIST FRACTURE SURGERY Right 1/2015    had to rebrake because u=it healed wrong       Family History:  Family History   Problem Relation Age of Onset    Heart Disease Maternal Grandmother     Stroke Maternal Grandmother        Social History:  Social History

## (undated) DEVICE — INFECTION CONTROL KIT SYS

## (undated) DEVICE — D&C/GYN-LF: Brand: MEDLINE INDUSTRIES, INC.

## (undated) DEVICE — SOLUTION IV 1000ML 0.9% SOD CHL

## (undated) DEVICE — KENDALL DL ECG CABLE AND LEAD WIRE SYSTEM, 3-LEAD, SINGLE PATIENT USE: Brand: KENDALL

## (undated) DEVICE — CONTINU-FLO SOLUTION SET, 2 INJECTION SITES, MALE LUER LOCK ADAPTER WITH RETRACTABLE COLLAR, LARGE BORE STOPCOCK WITH ROTATING MALE LUER LOCK EXTENSION SET, 2 INJECTION SITES, MALE LUER LOCK ADAPTER WITH RETRACTABLE COLLAR: Brand: INTERLINK/CONTINU-FLO

## (undated) DEVICE — FILTER SET UTER VAC CURET SYS -- GYRUS

## (undated) DEVICE — GOWN,SIRUS,FABRNF,XL,20/CS: Brand: MEDLINE

## (undated) DEVICE — PREP PAD BNS: Brand: CONVERTORS

## (undated) DEVICE — TUBING SUCT L6FT ID0.5IN CLR PLAS M CONN

## (undated) DEVICE — TOWEL SURG W17XL27IN STD BLU COT NONFENESTRATED PREWASHED

## (undated) DEVICE — 3M™ TEGADERM™ TRANSPARENT FILM DRESSING FRAME STYLE, 1624W, 2-3/8 IN X 2-3/4 IN (6 CM X 7 CM), 100/CT 4CT/CASE: Brand: 3M™ TEGADERM™

## (undated) DEVICE — NEEDLE SPNL 22GA L3.5IN BLK HUB S STL REG WALL FIT STYL W/

## (undated) DEVICE — CURETTE VAC DIA10MM PLAS CRV OPN TIP RIG STR FIRM W/ FRST

## (undated) DEVICE — LIGHT HANDLE: Brand: DEVON

## (undated) DEVICE — (D)SYR 10ML 1/5ML GRAD NSAF -- PKGING CHANGE USE ITEM 338027

## (undated) DEVICE — COLLECTION KT SUC TISS BERK -- GYRUS

## (undated) DEVICE — SOLUTION LACTATED RINGERS INJECTION USP

## (undated) DEVICE — STERILE POLYISOPRENE POWDER-FREE SURGICAL GLOVES: Brand: PROTEXIS